# Patient Record
Sex: FEMALE | Race: WHITE | NOT HISPANIC OR LATINO | Employment: PART TIME | URBAN - NONMETROPOLITAN AREA
[De-identification: names, ages, dates, MRNs, and addresses within clinical notes are randomized per-mention and may not be internally consistent; named-entity substitution may affect disease eponyms.]

---

## 2020-10-22 ENCOUNTER — APPOINTMENT (OUTPATIENT)
Dept: LAB | Facility: HOSPITAL | Age: 65
End: 2020-10-22
Attending: INTERNAL MEDICINE
Payer: MEDICARE

## 2020-10-22 DIAGNOSIS — Z01.818 OTHER SPECIFIED PRE-OPERATIVE EXAMINATION: Primary | ICD-10-CM

## 2020-10-22 LAB — SARS-COV-2 RNA RESP QL NAA+PROBE: NOT DETECTED

## 2020-10-22 PROCEDURE — U0002 COVID-19 LAB TEST NON-CDC: HCPCS

## 2020-10-27 PROBLEM — I48.19 PERSISTENT ATRIAL FIBRILLATION: Status: ACTIVE | Noted: 2020-10-27

## 2020-10-27 PROBLEM — I48.91 ATRIAL FIBRILLATION: Status: ACTIVE | Noted: 2020-10-27

## 2022-01-24 ENCOUNTER — HOSPITAL ENCOUNTER (INPATIENT)
Facility: HOSPITAL | Age: 67
LOS: 4 days | Discharge: HOME | DRG: 378 | End: 2022-01-28
Attending: EMERGENCY MEDICINE | Admitting: INTERNAL MEDICINE
Payer: MEDICARE

## 2022-01-24 DIAGNOSIS — Z01.818 PREOP TESTING: ICD-10-CM

## 2022-01-24 DIAGNOSIS — I48.19 PERSISTENT ATRIAL FIBRILLATION: ICD-10-CM

## 2022-01-24 DIAGNOSIS — D50.0 IRON DEFICIENCY ANEMIA DUE TO CHRONIC BLOOD LOSS: ICD-10-CM

## 2022-01-24 DIAGNOSIS — I48.91 ATRIAL FIBRILLATION: ICD-10-CM

## 2022-01-24 DIAGNOSIS — R06.09 DOE (DYSPNEA ON EXERTION): ICD-10-CM

## 2022-01-24 DIAGNOSIS — K92.2 LOWER GI BLEED: ICD-10-CM

## 2022-01-24 DIAGNOSIS — D64.9 ANEMIA, UNSPECIFIED TYPE: ICD-10-CM

## 2022-01-24 DIAGNOSIS — K29.60 EROSIVE GASTRITIS: Primary | ICD-10-CM

## 2022-01-24 DIAGNOSIS — D64.9 SYMPTOMATIC ANEMIA: ICD-10-CM

## 2022-01-24 DIAGNOSIS — K92.2 GI BLEED: ICD-10-CM

## 2022-01-24 LAB
ABO + RH BLD: NORMAL
ALBUMIN SERPL BCP-MCNC: 2.8 G/DL (ref 3.5–5.2)
ALP SERPL-CCNC: 46 U/L (ref 55–135)
ALT SERPL W/O P-5'-P-CCNC: 15 U/L (ref 10–44)
ANION GAP SERPL CALC-SCNC: 6 MMOL/L (ref 8–16)
AST SERPL-CCNC: 15 U/L (ref 10–40)
BASOPHILS # BLD AUTO: 0.05 K/UL (ref 0–0.2)
BASOPHILS NFR BLD: 0.8 % (ref 0–1.9)
BILIRUB SERPL-MCNC: 0.6 MG/DL (ref 0.1–1)
BLD GP AB SCN CELLS X3 SERPL QL: NORMAL
BUN SERPL-MCNC: 19 MG/DL (ref 8–23)
CALCIUM SERPL-MCNC: 8.2 MG/DL (ref 8.7–10.5)
CHLORIDE SERPL-SCNC: 110 MMOL/L (ref 95–110)
CO2 SERPL-SCNC: 29 MMOL/L (ref 23–29)
CREAT SERPL-MCNC: 1.2 MG/DL (ref 0.5–1.4)
CTP QC/QA: YES
DIFFERENTIAL METHOD: ABNORMAL
EOSINOPHIL # BLD AUTO: 0.1 K/UL (ref 0–0.5)
EOSINOPHIL NFR BLD: 1.8 % (ref 0–8)
ERYTHROCYTE [DISTWIDTH] IN BLOOD BY AUTOMATED COUNT: 18.4 % (ref 11.5–14.5)
EST. GFR  (AFRICAN AMERICAN): 53.7 ML/MIN/1.73 M^2
EST. GFR  (NON AFRICAN AMERICAN): 46.5 ML/MIN/1.73 M^2
GLUCOSE SERPL-MCNC: 107 MG/DL (ref 70–110)
HCT VFR BLD AUTO: 20.1 % (ref 37–48.5)
HGB BLD-MCNC: 5.8 G/DL (ref 12–16)
IMM GRANULOCYTES # BLD AUTO: 0.01 K/UL (ref 0–0.04)
IMM GRANULOCYTES NFR BLD AUTO: 0.2 % (ref 0–0.5)
IRON SATN MFR SERPL: 6 % (ref 20–50)
IRON SERPL-MCNC: 22 UG/DL (ref 30–160)
LYMPHOCYTES # BLD AUTO: 1.9 K/UL (ref 1–4.8)
LYMPHOCYTES NFR BLD: 29.9 % (ref 18–48)
MCH RBC QN AUTO: 34.1 PG (ref 27–31)
MCHC RBC AUTO-ENTMCNC: 28.9 G/DL (ref 32–36)
MCV RBC AUTO: 118 FL (ref 82–98)
MONOCYTES # BLD AUTO: 0.6 K/UL (ref 0.3–1)
MONOCYTES NFR BLD: 9.7 % (ref 4–15)
NEUTROPHILS # BLD AUTO: 3.6 K/UL (ref 1.8–7.7)
NEUTROPHILS NFR BLD: 57.6 % (ref 38–73)
NRBC BLD-RTO: 2 /100 WBC
NT-PROBNP SERPL-MCNC: 2372 PG/ML (ref 5–900)
OB PNL STL: POSITIVE
PLATELET # BLD AUTO: 192 K/UL (ref 150–450)
PMV BLD AUTO: 11.2 FL (ref 9.2–12.9)
POTASSIUM SERPL-SCNC: 3.8 MMOL/L (ref 3.5–5.1)
PROT SERPL-MCNC: 5.9 G/DL (ref 6–8.4)
RBC # BLD AUTO: 1.7 M/UL (ref 4–5.4)
SARS-COV-2 RDRP RESP QL NAA+PROBE: NEGATIVE
SODIUM SERPL-SCNC: 145 MMOL/L (ref 136–145)
TOTAL IRON BINDING CAPACITY: 375 UG/DL (ref 250–450)
TROPONIN I SERPL DL<=0.01 NG/ML-MCNC: 15.9 PG/ML (ref 0–60)
WBC # BLD AUTO: 6.18 K/UL (ref 3.9–12.7)

## 2022-01-24 PROCEDURE — 36430 TRANSFUSION BLD/BLD COMPNT: CPT

## 2022-01-24 PROCEDURE — 27000221 HC OXYGEN, UP TO 24 HOURS

## 2022-01-24 PROCEDURE — 86920 COMPATIBILITY TEST SPIN: CPT | Performed by: EMERGENCY MEDICINE

## 2022-01-24 PROCEDURE — 84484 ASSAY OF TROPONIN QUANT: CPT | Performed by: EMERGENCY MEDICINE

## 2022-01-24 PROCEDURE — 99285 EMERGENCY DEPT VISIT HI MDM: CPT | Mod: 25

## 2022-01-24 PROCEDURE — 94761 N-INVAS EAR/PLS OXIMETRY MLT: CPT

## 2022-01-24 PROCEDURE — C9113 INJ PANTOPRAZOLE SODIUM, VIA: HCPCS | Performed by: SURGERY

## 2022-01-24 PROCEDURE — 83540 ASSAY OF IRON: CPT | Performed by: EMERGENCY MEDICINE

## 2022-01-24 PROCEDURE — 85025 COMPLETE CBC W/AUTO DIFF WBC: CPT | Performed by: EMERGENCY MEDICINE

## 2022-01-24 PROCEDURE — 99900031 HC PATIENT EDUCATION (STAT)

## 2022-01-24 PROCEDURE — 93010 ELECTROCARDIOGRAM REPORT: CPT | Mod: ,,, | Performed by: INTERNAL MEDICINE

## 2022-01-24 PROCEDURE — P9016 RBC LEUKOCYTES REDUCED: HCPCS | Performed by: EMERGENCY MEDICINE

## 2022-01-24 PROCEDURE — 80053 COMPREHEN METABOLIC PANEL: CPT | Performed by: EMERGENCY MEDICINE

## 2022-01-24 PROCEDURE — 25000003 PHARM REV CODE 250: Performed by: SURGERY

## 2022-01-24 PROCEDURE — 25000003 PHARM REV CODE 250: Performed by: EMERGENCY MEDICINE

## 2022-01-24 PROCEDURE — 11000001 HC ACUTE MED/SURG PRIVATE ROOM

## 2022-01-24 PROCEDURE — 93010 EKG 12-LEAD: ICD-10-PCS | Mod: ,,, | Performed by: INTERNAL MEDICINE

## 2022-01-24 PROCEDURE — 83880 ASSAY OF NATRIURETIC PEPTIDE: CPT | Performed by: EMERGENCY MEDICINE

## 2022-01-24 PROCEDURE — 63600175 PHARM REV CODE 636 W HCPCS: Performed by: SURGERY

## 2022-01-24 PROCEDURE — 82272 OCCULT BLD FECES 1-3 TESTS: CPT | Performed by: EMERGENCY MEDICINE

## 2022-01-24 PROCEDURE — 86850 RBC ANTIBODY SCREEN: CPT | Performed by: EMERGENCY MEDICINE

## 2022-01-24 PROCEDURE — 36415 COLL VENOUS BLD VENIPUNCTURE: CPT | Performed by: EMERGENCY MEDICINE

## 2022-01-24 PROCEDURE — U0002 COVID-19 LAB TEST NON-CDC: HCPCS | Performed by: EMERGENCY MEDICINE

## 2022-01-24 PROCEDURE — 93005 ELECTROCARDIOGRAM TRACING: CPT

## 2022-01-24 PROCEDURE — 99900035 HC TECH TIME PER 15 MIN (STAT)

## 2022-01-24 RX ORDER — SODIUM CHLORIDE 0.9 % (FLUSH) 0.9 %
10 SYRINGE (ML) INJECTION
Status: DISCONTINUED | OUTPATIENT
Start: 2022-01-24 | End: 2022-01-28 | Stop reason: HOSPADM

## 2022-01-24 RX ORDER — ONDANSETRON 2 MG/ML
4 INJECTION INTRAMUSCULAR; INTRAVENOUS EVERY 8 HOURS PRN
Status: DISCONTINUED | OUTPATIENT
Start: 2022-01-24 | End: 2022-01-28 | Stop reason: HOSPADM

## 2022-01-24 RX ORDER — TALC
6 POWDER (GRAM) TOPICAL NIGHTLY PRN
Status: DISCONTINUED | OUTPATIENT
Start: 2022-01-24 | End: 2022-01-28 | Stop reason: HOSPADM

## 2022-01-24 RX ORDER — HYDROCODONE BITARTRATE AND ACETAMINOPHEN 5; 325 MG/1; MG/1
1 TABLET ORAL EVERY 6 HOURS PRN
Status: DISCONTINUED | OUTPATIENT
Start: 2022-01-24 | End: 2022-01-25

## 2022-01-24 RX ORDER — HYDROCODONE BITARTRATE AND ACETAMINOPHEN 500; 5 MG/1; MG/1
TABLET ORAL
Status: DISCONTINUED | OUTPATIENT
Start: 2022-01-24 | End: 2022-01-28 | Stop reason: HOSPADM

## 2022-01-24 RX ORDER — GABAPENTIN 300 MG/1
300 CAPSULE ORAL NIGHTLY
Status: DISCONTINUED | OUTPATIENT
Start: 2022-01-24 | End: 2022-01-28 | Stop reason: HOSPADM

## 2022-01-24 RX ORDER — METOPROLOL TARTRATE 50 MG/1
50 TABLET ORAL 2 TIMES DAILY
Status: DISCONTINUED | OUTPATIENT
Start: 2022-01-24 | End: 2022-01-28 | Stop reason: HOSPADM

## 2022-01-24 RX ORDER — ALPRAZOLAM 0.5 MG/1
0.5 TABLET ORAL 2 TIMES DAILY PRN
Status: DISCONTINUED | OUTPATIENT
Start: 2022-01-24 | End: 2022-01-28 | Stop reason: HOSPADM

## 2022-01-24 RX ORDER — FUROSEMIDE 20 MG/1
20 TABLET ORAL DAILY
Status: DISCONTINUED | OUTPATIENT
Start: 2022-01-25 | End: 2022-01-28 | Stop reason: HOSPADM

## 2022-01-24 RX ORDER — PANTOPRAZOLE SODIUM 40 MG/10ML
40 INJECTION, POWDER, LYOPHILIZED, FOR SOLUTION INTRAVENOUS 2 TIMES DAILY
Status: DISCONTINUED | OUTPATIENT
Start: 2022-01-24 | End: 2022-01-28 | Stop reason: HOSPADM

## 2022-01-24 RX ORDER — FUROSEMIDE 10 MG/ML
20 INJECTION INTRAMUSCULAR; INTRAVENOUS ONCE AS NEEDED
Status: COMPLETED | OUTPATIENT
Start: 2022-01-24 | End: 2022-01-25

## 2022-01-24 RX ADMIN — HYDROCODONE BITARTRATE AND ACETAMINOPHEN 1 TABLET: 5; 325 TABLET ORAL at 08:01

## 2022-01-24 RX ADMIN — GABAPENTIN 300 MG: 300 CAPSULE ORAL at 08:01

## 2022-01-24 RX ADMIN — MELATONIN TAB 3 MG 6 MG: 3 TAB at 08:01

## 2022-01-24 RX ADMIN — ALPRAZOLAM 0.5 MG: 0.5 TABLET ORAL at 08:01

## 2022-01-24 RX ADMIN — SODIUM CHLORIDE: 0.9 INJECTION, SOLUTION INTRAVENOUS at 08:01

## 2022-01-24 RX ADMIN — PANTOPRAZOLE SODIUM 40 MG: 40 INJECTION, POWDER, FOR SOLUTION INTRAVENOUS at 10:01

## 2022-01-24 NOTE — ED PROVIDER NOTES
Encounter Date: 1/24/2022       History     Chief Complaint   Patient presents with    Shortness of Breath     EMS reporting that pt has dyspnea upon exertion since Tueday, (spo2 drops into upper 80s). Spo2 99% at rest.      67 yo female with history of atrial fibrillation on eliquis here via EMS with BRIGGS. Gradual onset x 1 week. No CP. Symptoms resolve at rest. No fever. Denies any hemorrhage. No prior similar.         Review of patient's allergies indicates:   Allergen Reactions    Iodine and iodide containing products Edema    Latex, natural rubber Hives     Past Medical History:   Diagnosis Date    Adult hypothyroidism     Anxiety     Atherosclerosis of both carotid arteries     Atrial fibrillation     Atrial fibrillation     CHF (congestive heart failure)     Moderate aortic valve stenosis      Past Surgical History:   Procedure Laterality Date    APPENDECTOMY      KNEE SURGERY      TONSILLECTOMY      TREATMENT OF CARDIAC ARRHYTHMIA N/A 10/27/2020    Procedure: Cardioversion or Defibrillation;  Surgeon: Gavin Duran MD;  Location: [facility];  Service: Cardiovascular;  Laterality: N/A;  APPROVED PER POORNIMA 10/6     Family History   Problem Relation Age of Onset    Heart failure Father     Hypertension Sister     Atrial fibrillation Sister     Cancer Brother     Hypertension Brother      Social History     Tobacco Use    Smoking status: Never Smoker     Review of Systems   Constitutional: Positive for fatigue. Negative for fever.   Respiratory: Positive for shortness of breath.    Cardiovascular: Negative.    Gastrointestinal: Negative.    All other systems reviewed and are negative.      Physical Exam     Initial Vitals   BP Pulse Resp Temp SpO2   01/24/22 1415 01/24/22 1415 01/24/22 1415 01/24/22 1418 01/24/22 1415   (!) 119/42 (!) 56 20 98 °F (36.7 °C) 99 %      MAP       --                Physical Exam    Nursing note and vitals reviewed.  Constitutional: She appears well-developed and  well-nourished. She is not diaphoretic. No distress.   HENT:   Head: Normocephalic and atraumatic.   Eyes: EOM are normal. Pupils are equal, round, and reactive to light. No scleral icterus.   Pale conjunctiva   Neck: Neck supple.   Normal range of motion.  Cardiovascular: Normal rate, regular rhythm and intact distal pulses.   Murmur heard.  Pulmonary/Chest: Breath sounds normal. No respiratory distress. She has no wheezes. She has no rales.   Abdominal: Abdomen is soft. Bowel sounds are normal. She exhibits no distension. There is no abdominal tenderness. There is no rebound.   Musculoskeletal:         General: No tenderness or edema. Normal range of motion.      Cervical back: Normal range of motion and neck supple.     Neurological: She is alert. She has normal strength and normal reflexes.   Skin: Skin is warm and dry. Capillary refill takes less than 2 seconds. No rash noted.         ED Course   Procedures  Labs Reviewed   CBC W/ AUTO DIFFERENTIAL - Abnormal; Notable for the following components:       Result Value    RBC 1.70 (*)     Hemoglobin 5.8 (*)     Hematocrit 20.1 (*)      (*)     MCH 34.1 (*)     MCHC 28.9 (*)     RDW 18.4 (*)     nRBC 2 (*)     All other components within normal limits    Narrative:     Called to and read back by Yola TOMLINSON by PRESLEY 01/24/2022 15:01   COMPREHENSIVE METABOLIC PANEL - Abnormal; Notable for the following components:    Calcium 8.2 (*)     Total Protein 5.9 (*)     Albumin 2.8 (*)     Alkaline Phosphatase 46 (*)     Anion Gap 6 (*)     eGFR if  53.7 (*)     eGFR if non  46.5 (*)     All other components within normal limits   NT-PRO NATRIURETIC PEPTIDE - Abnormal; Notable for the following components:    NT-proBNP 2372 (*)     All other components within normal limits   OCCULT BLOOD X 1, STOOL - Abnormal; Notable for the following components:    Occult Blood Positive (*)     All other components within normal limits   TROPONIN I HIGH  SENSITIVITY   IRON AND TIBC   IRON AND TIBC   SARS-COV-2 RDRP GENE    Narrative:     ..This test utilizes isothermal nucleic acid amplification   technology to detect the SARS-CoV-2 RdRp nucleic acid segment.   The analytical sensitivity (limit of detection) is 125 genome   equivalents/mL.   A POSITIVE result implies infection with the SARS-CoV-2 virus;   the patient is presumed to be contagious.     A NEGATIVE result means that SARS-CoV-2 nucleic acids are not   present above the limit of detection. A NEGATIVE result should be   treated as presumptive. It does not rule out the possibility of   COVID-19 and should not be the sole basis for treatment decisions.   If COVID-19 is strongly suspected based on clinical and exposure   history, re-testing using an alternate molecular assay should be   considered.   This test is only for use under the Food and Drug   Administration s Emergency Use Authorization (EUA).   Commercial kits are provided by [employer].   Performance characteristics of the EUA have been independently   verified by [facility] Department of   Pathology and Laboratory Medicine.   _________________________________________________________________   The authorized Fact Sheet for Healthcare Providers and the authorized Fact   Sheet for Patients of the ID NOW COVID-19 are available on the FDA   website:     https://www.fda.gov/media/025608/download  https://www.fda.gov/media/071461/download           TYPE & SCREEN     EKG Readings: (Independently Interpreted)   Initial Reading: No STEMI. Rhythm: Sinus Bradycardia. Heart Rate: 54. Ectopy: No Ectopy. Clinical Impression: Sinus Bradycardia     ECG Results          EKG 12-lead (Final result)  Result time 01/24/22 16:06:26    Final result by Interface, Lab In [facility] (01/24/22 16:06:26)                 Narrative:    Test Reason : R06.00,    Vent. Rate : 054 BPM     Atrial Rate : 054 BPM     P-R Int : 188 ms          QRS Dur : 086 ms      QT  Int : 458 ms       P-R-T Axes : 083 -07 035 degrees     QTc Int : 434 ms    Sinus bradycardia  Otherwise normal ECG  When compared with ECG of 27-OCT-2020 07:44,  Criteria for Inferior infarct are no longer Present  ST no longer depressed in Inferior leads  Confirmed by DARLYN CHAMBERS MD ([id number]) on 1/24/2022 4:06:18 PM    Referred By: System System           Confirmed By:DARLYN CHAMBERS MD                            Imaging Results          X-Ray Chest AP Portable (Final result)  Result time 01/24/22 14:40:00    Final result by Natahnael Hill MD (01/24/22 14:40:00)                 Impression:      Chronic elevation right hemidiaphragm.  No acute findings.      Electronically signed by: Nathanael Hill MD  Date:    01/24/2022  Time:    14:40             Narrative:    EXAMINATION:  XR CHEST AP PORTABLE    CLINICAL HISTORY:  Dyspnea, unspecified    COMPARISON:  Chest x-ray 02/13/2013.    FINDINGS:  Unremarkable AP cardiac silhouette.  Chronic elevation right hemidiaphragm with adjacent atelectasis.  No acute consolidation or pleural fluid.                                 Medications - No data to display  Medical Decision Making:   Clinical Tests:   Lab Tests: Ordered and Reviewed  Radiological Study: Reviewed and Ordered  Medical Tests: Ordered and Reviewed  ED Management:  Discussed with Dr Kellogg, agrees to admit patient.                       Clinical Impression:   Final diagnoses:  [R06.00] BRIGGS (dyspnea on exertion)  [D64.9] Anemia, unspecified type (Primary)  [D64.9] Symptomatic anemia  [K92.2] Lower GI bleed          ED Disposition Condition    Observation               Beni Solis MD  01/24/22 3004

## 2022-01-25 PROBLEM — K92.2 GI BLEED: Status: ACTIVE | Noted: 2022-01-25

## 2022-01-25 PROBLEM — D64.9 ANEMIA: Status: ACTIVE | Noted: 2022-01-25

## 2022-01-25 PROBLEM — R06.02 SOB (SHORTNESS OF BREATH): Status: ACTIVE | Noted: 2022-01-25

## 2022-01-25 LAB
ALBUMIN SERPL BCP-MCNC: 2.7 G/DL (ref 3.5–5.2)
ALP SERPL-CCNC: 47 U/L (ref 55–135)
ALT SERPL W/O P-5'-P-CCNC: 16 U/L (ref 10–44)
ANION GAP SERPL CALC-SCNC: 4 MMOL/L (ref 8–16)
AST SERPL-CCNC: 19 U/L (ref 10–40)
BASOPHILS # BLD AUTO: 0.07 K/UL (ref 0–0.2)
BASOPHILS NFR BLD: 1.1 % (ref 0–1.9)
BILIRUB SERPL-MCNC: 0.9 MG/DL (ref 0.1–1)
BLD PROD TYP BPU: NORMAL
BLOOD UNIT EXPIRATION DATE: NORMAL
BLOOD UNIT TYPE CODE: 5100
BLOOD UNIT TYPE: NORMAL
BUN SERPL-MCNC: 17 MG/DL (ref 8–23)
CALCIUM SERPL-MCNC: 8 MG/DL (ref 8.7–10.5)
CHLORIDE SERPL-SCNC: 112 MMOL/L (ref 95–110)
CO2 SERPL-SCNC: 31 MMOL/L (ref 23–29)
CODING SYSTEM: NORMAL
CREAT SERPL-MCNC: 1.2 MG/DL (ref 0.5–1.4)
DIFFERENTIAL METHOD: ABNORMAL
DISPENSE STATUS: NORMAL
EOSINOPHIL # BLD AUTO: 0.2 K/UL (ref 0–0.5)
EOSINOPHIL NFR BLD: 2.6 % (ref 0–8)
ERYTHROCYTE [DISTWIDTH] IN BLOOD BY AUTOMATED COUNT: 23 % (ref 11.5–14.5)
EST. GFR  (AFRICAN AMERICAN): 53.7 ML/MIN/1.73 M^2
EST. GFR  (NON AFRICAN AMERICAN): 46.5 ML/MIN/1.73 M^2
GLUCOSE SERPL-MCNC: 85 MG/DL (ref 70–110)
HCT VFR BLD AUTO: 29.7 % (ref 37–48.5)
HGB BLD-MCNC: 9.2 G/DL (ref 12–16)
IMM GRANULOCYTES # BLD AUTO: 0.02 K/UL (ref 0–0.04)
IMM GRANULOCYTES NFR BLD AUTO: 0.3 % (ref 0–0.5)
LYMPHOCYTES # BLD AUTO: 2 K/UL (ref 1–4.8)
LYMPHOCYTES NFR BLD: 31.3 % (ref 18–48)
MCH RBC QN AUTO: 32.7 PG (ref 27–31)
MCHC RBC AUTO-ENTMCNC: 31 G/DL (ref 32–36)
MCV RBC AUTO: 106 FL (ref 82–98)
MONOCYTES # BLD AUTO: 0.6 K/UL (ref 0.3–1)
MONOCYTES NFR BLD: 10.3 % (ref 4–15)
NEUTROPHILS # BLD AUTO: 3.4 K/UL (ref 1.8–7.7)
NEUTROPHILS NFR BLD: 54.4 % (ref 38–73)
NRBC BLD-RTO: 1 /100 WBC
NUM UNITS TRANS PACKED RBC: NORMAL
PLATELET # BLD AUTO: 197 K/UL (ref 150–450)
PMV BLD AUTO: 11.2 FL (ref 9.2–12.9)
POTASSIUM SERPL-SCNC: 3.8 MMOL/L (ref 3.5–5.1)
PROT SERPL-MCNC: 5.9 G/DL (ref 6–8.4)
RBC # BLD AUTO: 2.81 M/UL (ref 4–5.4)
SODIUM SERPL-SCNC: 147 MMOL/L (ref 136–145)
WBC # BLD AUTO: 6.24 K/UL (ref 3.9–12.7)

## 2022-01-25 PROCEDURE — 25000003 PHARM REV CODE 250: Performed by: INTERNAL MEDICINE

## 2022-01-25 PROCEDURE — C9113 INJ PANTOPRAZOLE SODIUM, VIA: HCPCS | Performed by: SURGERY

## 2022-01-25 PROCEDURE — 99900031 HC PATIENT EDUCATION (STAT)

## 2022-01-25 PROCEDURE — 93010 EKG 12-LEAD: ICD-10-PCS | Mod: ,,, | Performed by: INTERNAL MEDICINE

## 2022-01-25 PROCEDURE — 36430 TRANSFUSION BLD/BLD COMPNT: CPT

## 2022-01-25 PROCEDURE — 93010 ELECTROCARDIOGRAM REPORT: CPT | Mod: ,,, | Performed by: INTERNAL MEDICINE

## 2022-01-25 PROCEDURE — 99900035 HC TECH TIME PER 15 MIN (STAT)

## 2022-01-25 PROCEDURE — 63600175 PHARM REV CODE 636 W HCPCS: Performed by: SURGERY

## 2022-01-25 PROCEDURE — 94761 N-INVAS EAR/PLS OXIMETRY MLT: CPT

## 2022-01-25 PROCEDURE — 25000003 PHARM REV CODE 250: Performed by: EMERGENCY MEDICINE

## 2022-01-25 PROCEDURE — 93005 ELECTROCARDIOGRAM TRACING: CPT

## 2022-01-25 PROCEDURE — 80053 COMPREHEN METABOLIC PANEL: CPT | Performed by: EMERGENCY MEDICINE

## 2022-01-25 PROCEDURE — 27000221 HC OXYGEN, UP TO 24 HOURS

## 2022-01-25 PROCEDURE — 63600175 PHARM REV CODE 636 W HCPCS: Performed by: EMERGENCY MEDICINE

## 2022-01-25 PROCEDURE — 36415 COLL VENOUS BLD VENIPUNCTURE: CPT | Performed by: EMERGENCY MEDICINE

## 2022-01-25 PROCEDURE — 25000003 PHARM REV CODE 250: Performed by: NURSE PRACTITIONER

## 2022-01-25 PROCEDURE — 11000001 HC ACUTE MED/SURG PRIVATE ROOM

## 2022-01-25 PROCEDURE — C9113 INJ PANTOPRAZOLE SODIUM, VIA: HCPCS | Performed by: INTERNAL MEDICINE

## 2022-01-25 PROCEDURE — 63600175 PHARM REV CODE 636 W HCPCS: Performed by: INTERNAL MEDICINE

## 2022-01-25 PROCEDURE — 85025 COMPLETE CBC W/AUTO DIFF WBC: CPT | Performed by: EMERGENCY MEDICINE

## 2022-01-25 PROCEDURE — P9016 RBC LEUKOCYTES REDUCED: HCPCS | Performed by: EMERGENCY MEDICINE

## 2022-01-25 RX ORDER — LEVOTHYROXINE SODIUM 150 UG/1
150 TABLET ORAL
Status: DISCONTINUED | OUTPATIENT
Start: 2022-01-25 | End: 2022-01-28 | Stop reason: HOSPADM

## 2022-01-25 RX ORDER — HYDROCODONE BITARTRATE AND ACETAMINOPHEN 7.5; 325 MG/1; MG/1
1 TABLET ORAL EVERY 6 HOURS PRN
Status: DISCONTINUED | OUTPATIENT
Start: 2022-01-25 | End: 2022-01-28 | Stop reason: HOSPADM

## 2022-01-25 RX ORDER — AMIODARONE HYDROCHLORIDE 100 MG/1
100 TABLET ORAL DAILY
Status: DISCONTINUED | OUTPATIENT
Start: 2022-01-25 | End: 2022-01-28 | Stop reason: HOSPADM

## 2022-01-25 RX ORDER — ASCORBIC ACID 500 MG
1000 TABLET ORAL DAILY
Status: DISCONTINUED | OUTPATIENT
Start: 2022-01-25 | End: 2022-01-28 | Stop reason: HOSPADM

## 2022-01-25 RX ADMIN — OXYCODONE HYDROCHLORIDE AND ACETAMINOPHEN 1000 MG: 500 TABLET ORAL at 10:01

## 2022-01-25 RX ADMIN — FUROSEMIDE 20 MG: 10 INJECTION, SOLUTION INTRAMUSCULAR; INTRAVENOUS at 02:01

## 2022-01-25 RX ADMIN — METOPROLOL TARTRATE 50 MG: 50 TABLET, FILM COATED ORAL at 07:01

## 2022-01-25 RX ADMIN — FUROSEMIDE 20 MG: 20 TABLET ORAL at 07:01

## 2022-01-25 RX ADMIN — AMIODARONE HYDROCHLORIDE 100 MG: 100 TABLET ORAL at 04:01

## 2022-01-25 RX ADMIN — METOPROLOL TARTRATE 50 MG: 50 TABLET, FILM COATED ORAL at 02:01

## 2022-01-25 RX ADMIN — IRON SUCROSE 200 MG: 20 INJECTION, SOLUTION INTRAVENOUS at 08:01

## 2022-01-25 RX ADMIN — METOPROLOL TARTRATE 50 MG: 50 TABLET, FILM COATED ORAL at 08:01

## 2022-01-25 RX ADMIN — PANTOPRAZOLE SODIUM 40 MG: 40 INJECTION, POWDER, FOR SOLUTION INTRAVENOUS at 07:01

## 2022-01-25 RX ADMIN — HYDROCODONE BITARTRATE AND ACETAMINOPHEN 1 TABLET: 7.5; 325 TABLET ORAL at 08:01

## 2022-01-25 RX ADMIN — MELATONIN TAB 3 MG 6 MG: 3 TAB at 08:01

## 2022-01-25 RX ADMIN — GABAPENTIN 300 MG: 300 CAPSULE ORAL at 08:01

## 2022-01-25 RX ADMIN — LEVOTHYROXINE SODIUM 150 MCG: 0.15 TABLET ORAL at 08:01

## 2022-01-25 RX ADMIN — PANTOPRAZOLE SODIUM 40 MG: 40 INJECTION, POWDER, FOR SOLUTION INTRAVENOUS at 08:01

## 2022-01-25 RX ADMIN — ALPRAZOLAM 0.5 MG: 0.5 TABLET ORAL at 08:01

## 2022-01-25 NOTE — HPI
67 yo female with history of atrial fibrillation on eliquis here via EMS with BRIGGS. Gradual onset x 1 week. No CP. Symptoms resolve at rest. No fever. Denies any hemorrhage. No prior similar.  Pt denies any brbpr or melena, or heartburn.

## 2022-01-25 NOTE — ASSESSMENT & PLAN NOTE
Pt with significant iron def anemia - sp 3 u prbcs - repeat h/h improved - gilberto singh dose of venofer - pt with positive fobt

## 2022-01-25 NOTE — PLAN OF CARE
01/25/22 0808   Medicare Message   Important Message from Medicare regarding Discharge Appeal Rights Other (comments)  (Obtained by registration staff.)   Date IMM was signed 01/24/22   Time IMM was signed [id number]

## 2022-01-25 NOTE — H&P
[facility] Emergency Department  [facility] Medicine  History & Physical    Patient Name: Aurelia Ruggiero  MRN: [id number]  Patient Class: IP- Inpatient  Admission Date: 1/24/2022  Attending Physician: No att. providers found   Primary Care Provider: Marimar Kellogg MD         Patient information was obtained from patient and ER records.     Subjective:     Principal Problem:<principal problem not specified>    Chief Complaint:   Chief Complaint   Patient presents with    Shortness of Breath     EMS reporting that pt has dyspnea upon exertion since Tueday, (spo2 drops into upper 80s). Spo2 99% at rest.         HPI:   65 yo female with history of atrial fibrillation on eliquis here via EMS with BRIGGS. Gradual onset x 1 week. No CP. Symptoms resolve at rest. No fever. Denies any hemorrhage. No prior similar.  Pt denies any brbpr or melena, or heartburn.       Past Medical History:   Diagnosis Date    Adult hypothyroidism     Anxiety     Atherosclerosis of both carotid arteries     Atrial fibrillation     Atrial fibrillation     CHF (congestive heart failure)     Moderate aortic valve stenosis        Past Surgical History:   Procedure Laterality Date    APPENDECTOMY      KNEE SURGERY      TONSILLECTOMY      TREATMENT OF CARDIAC ARRHYTHMIA N/A 10/27/2020    Procedure: Cardioversion or Defibrillation;  Surgeon: Gavin Duran MD;  Location: [facility];  Service: Cardiovascular;  Laterality: N/A;  APPROVED PER POORNIMA 10/6       Review of patient's allergies indicates:   Allergen Reactions    Iodine and iodide containing products Edema    Latex, natural rubber Hives       No current facility-administered medications on file prior to encounter.     Current Outpatient Medications on File Prior to Encounter   Medication Sig    ALPRAZolam (XANAX) 0.5 MG tablet Take 0.5 mg by mouth 2 (two) times daily as needed.    apixaban (ELIQUIS) 5 mg Tab Take 5 mg by mouth 2 (two) times daily.    ascorbic acid, vitamin C,  (VITAMIN C) 1000 MG tablet Take 1,000 mg by mouth once daily.    aspirin (ECOTRIN) 81 MG EC tablet Take 81 mg by mouth once daily.    b complex vitamins tablet Take 1 tablet by mouth once daily.    celecoxib (CELEBREX) 200 MG capsule Take 200 mg by mouth once daily.    ferrous sulfate (FEOSOL) 325 mg (65 mg iron) Tab tablet Take 325 mg by mouth daily with breakfast.    furosemide (LASIX) 20 MG tablet Take 20 mg by mouth once daily.    gabapentin (NEURONTIN) 300 MG capsule Take 300 mg by mouth every evening.    HYDROcodone-acetaminophen (NORCO) 7.5-325 mg per tablet Take 1 tablet by mouth daily as needed.    levothyroxine (SYNTHROID) 150 MCG tablet Take 150 mcg by mouth before breakfast.    metoprolol tartrate (LOPRESSOR) 50 MG tablet Take 50 mg by mouth 2 (two) times daily.    multivitamin (THERAGRAN) per tablet Take 1 tablet by mouth once daily.    vitamin D (VITAMIN D3) 1000 units Tab Take 1,000 Units by mouth 2 (two) times a day.     Family History     Problem Relation (Age of Onset)    Atrial fibrillation Sister    Cancer Brother    Heart failure Father    Hypertension Sister, Brother        Tobacco Use    Smoking status: Never Smoker    Smokeless tobacco: Never Used   Substance and Sexual Activity    Alcohol use: Never    Drug use: Never    Sexual activity: Not on file     Review of Systems   Constitutional: Positive for activity change. Negative for chills, fatigue and fever.   HENT: Negative for ear pain, postnasal drip and sore throat.    Respiratory: Negative for cough, choking, shortness of breath and wheezing.    Cardiovascular: Negative for chest pain, palpitations and leg swelling.   Gastrointestinal: Negative for abdominal pain, blood in stool, nausea and vomiting.   Genitourinary: Negative for difficulty urinating.   Musculoskeletal: Positive for arthralgias and back pain.   Skin: Negative for rash and wound.   Neurological: Positive for weakness. Negative for syncope.     Objective:      Vital Signs (Most Recent):  Temp: 98.9 °F (37.2 °C) (01/25/22 0705)  Pulse: 62 (01/25/22 0705)  Resp: 20 (01/25/22 0705)  BP: 123/68 (01/25/22 0705)  SpO2: 99 % (01/25/22 0705) Vital Signs (24h Range):  Temp:  [97.7 °F (36.5 °C)-98.9 °F (37.2 °C)] 98.9 °F (37.2 °C)  Pulse:  [56-83] 62  Resp:  [16-48] 20  SpO2:  [72 %-100 %] 99 %  BP: ()/(42-71) 123/68     Weight: (!) 142.9 kg (315 lb)  Body mass index is 54.07 kg/m².    Physical Exam  Constitutional:       Appearance: Normal appearance.      Comments: Obese female   HENT:      Nose: Nose normal.      Mouth/Throat:      Mouth: Mucous membranes are moist.   Eyes:      Extraocular Movements: Extraocular movements intact.      Pupils: Pupils are equal, round, and reactive to light.   Cardiovascular:      Rate and Rhythm: Normal rate and regular rhythm.   Pulmonary:      Effort: Pulmonary effort is normal. No respiratory distress.      Breath sounds: Normal breath sounds. No wheezing.   Abdominal:      General: There is no distension.      Palpations: Abdomen is soft.   Musculoskeletal:         General: Normal range of motion.   Skin:     General: Skin is warm.   Neurological:      Mental Status: She is alert.           CRANIAL NERVES     CN III, IV, VI   Pupils are equal, round, and reactive to light.       Significant Labs:   All pertinent labs within the past 24 hours have been reviewed.  CBC:   Recent Labs   Lab 01/24/22  1440 01/25/22  0653   WBC 6.18 6.24   HGB 5.8* 9.2*   HCT 20.1* 29.7*    197     CMP:   Recent Labs   Lab 01/24/22  1440 01/25/22  0653    147*   K 3.8 3.8    112*   CO2 29 31*    85   BUN 19 17   CREATININE 1.2 1.2   CALCIUM 8.2* 8.0*   PROT 5.9* 5.9*   ALBUMIN 2.8* 2.7*   BILITOT 0.6 0.9   ALKPHOS 46* 47*   AST 15 19   ALT 15 16   ANIONGAP 6* 4*   EGFRNONAA 46.5* 46.5*       Significant Imaging: I have reviewed all pertinent imaging results/findings within the past 24 hours.    Assessment/Plan:     SOB  (shortness of breath)  Sec to significant anemia - improved after blood transfusion      GI bleed  Positive fobt - surgery consult for poss egd/colon - protonix      Anemia  Pt with significant iron def anemia - sp 3 u prbcs - repeat h/h improved - gilberto hortensiaove dose of venofer - pt with positive fobt       Persistent atrial fibrillation  Patient with Persistent (7 days or more) atrial fibrillation which is controlled currently with Beta Blocker. Patient is currently in atrial fibrillation.[id number] Score: 2. Anticoagulation indicated. Anticoagulation done with eliquis - however curretnly on hold due to gi bleed and significant anemia.          VTE Risk Mitigation (From admission, onward)         Ordered     IP VTE HIGH RISK PATIENT  Once         01/24/22 1744     Place sequential compression device  Until discontinued         01/24/22 1744                   Marimar Kellogg MD  Department of Hospital Medicine   [place] - Emergency Department

## 2022-01-25 NOTE — SUBJECTIVE & OBJECTIVE
Past Medical History:   Diagnosis Date    Adult hypothyroidism     Anxiety     Atherosclerosis of both carotid arteries     Atrial fibrillation     Atrial fibrillation     CHF (congestive heart failure)     Moderate aortic valve stenosis        Past Surgical History:   Procedure Laterality Date    APPENDECTOMY      KNEE SURGERY      TONSILLECTOMY      TREATMENT OF CARDIAC ARRHYTHMIA N/A 10/27/2020    Procedure: Cardioversion or Defibrillation;  Surgeon: Gavin Duran MD;  Location: [facility];  Service: Cardiovascular;  Laterality: N/A;  APPROVED PER POORNIMA 10/6       Review of patient's allergies indicates:   Allergen Reactions    Iodine and iodide containing products Edema    Latex, natural rubber Hives       No current facility-administered medications on file prior to encounter.     Current Outpatient Medications on File Prior to Encounter   Medication Sig    ALPRAZolam (XANAX) 0.5 MG tablet Take 0.5 mg by mouth 2 (two) times daily as needed.    apixaban (ELIQUIS) 5 mg Tab Take 5 mg by mouth 2 (two) times daily.    ascorbic acid, vitamin C, (VITAMIN C) 1000 MG tablet Take 1,000 mg by mouth once daily.    aspirin (ECOTRIN) 81 MG EC tablet Take 81 mg by mouth once daily.    b complex vitamins tablet Take 1 tablet by mouth once daily.    celecoxib (CELEBREX) 200 MG capsule Take 200 mg by mouth once daily.    ferrous sulfate (FEOSOL) 325 mg (65 mg iron) Tab tablet Take 325 mg by mouth daily with breakfast.    furosemide (LASIX) 20 MG tablet Take 20 mg by mouth once daily.    gabapentin (NEURONTIN) 300 MG capsule Take 300 mg by mouth every evening.    HYDROcodone-acetaminophen (NORCO) 7.5-325 mg per tablet Take 1 tablet by mouth daily as needed.    levothyroxine (SYNTHROID) 150 MCG tablet Take 150 mcg by mouth before breakfast.    metoprolol tartrate (LOPRESSOR) 50 MG tablet Take 50 mg by mouth 2 (two) times daily.    multivitamin (THERAGRAN) per tablet Take 1 tablet by mouth once daily.     vitamin D (VITAMIN D3) 1000 units Tab Take 1,000 Units by mouth 2 (two) times a day.     Family History     Problem Relation (Age of Onset)    Atrial fibrillation Sister    Cancer Brother    Heart failure Father    Hypertension Sister, Brother        Tobacco Use    Smoking status: Never Smoker    Smokeless tobacco: Never Used   Substance and Sexual Activity    Alcohol use: Never    Drug use: Never    Sexual activity: Not on file     Review of Systems   Constitutional: Positive for activity change. Negative for chills, fatigue and fever.   HENT: Negative for ear pain, postnasal drip and sore throat.    Respiratory: Negative for cough, choking, shortness of breath and wheezing.    Cardiovascular: Negative for chest pain, palpitations and leg swelling.   Gastrointestinal: Negative for abdominal pain, blood in stool, nausea and vomiting.   Genitourinary: Negative for difficulty urinating.   Musculoskeletal: Positive for arthralgias and back pain.   Skin: Negative for rash and wound.   Neurological: Positive for weakness. Negative for syncope.     Objective:     Vital Signs (Most Recent):  Temp: 98.9 °F (37.2 °C) (01/25/22 0705)  Pulse: 62 (01/25/22 0705)  Resp: 20 (01/25/22 0705)  BP: 123/68 (01/25/22 0705)  SpO2: 99 % (01/25/22 0705) Vital Signs (24h Range):  Temp:  [97.7 °F (36.5 °C)-98.9 °F (37.2 °C)] 98.9 °F (37.2 °C)  Pulse:  [56-83] 62  Resp:  [16-48] 20  SpO2:  [72 %-100 %] 99 %  BP: ()/(42-71) 123/68     Weight: (!) 142.9 kg (315 lb)  Body mass index is 54.07 kg/m².    Physical Exam  Constitutional:       Appearance: Normal appearance.      Comments: Obese female   HENT:      Nose: Nose normal.      Mouth/Throat:      Mouth: Mucous membranes are moist.   Eyes:      Extraocular Movements: Extraocular movements intact.      Pupils: Pupils are equal, round, and reactive to light.   Cardiovascular:      Rate and Rhythm: Normal rate and regular rhythm.   Pulmonary:      Effort: Pulmonary effort is  normal. No respiratory distress.      Breath sounds: Normal breath sounds. No wheezing.   Abdominal:      General: There is no distension.      Palpations: Abdomen is soft.   Musculoskeletal:         General: Normal range of motion.   Skin:     General: Skin is warm.   Neurological:      Mental Status: She is alert.           CRANIAL NERVES     CN III, IV, VI   Pupils are equal, round, and reactive to light.       Significant Labs:   All pertinent labs within the past 24 hours have been reviewed.  CBC:   Recent Labs   Lab 01/24/22  1440 01/25/22  0653   WBC 6.18 6.24   HGB 5.8* 9.2*   HCT 20.1* 29.7*    197     CMP:   Recent Labs   Lab 01/24/22  1440 01/25/22  0653    147*   K 3.8 3.8    112*   CO2 29 31*    85   BUN 19 17   CREATININE 1.2 1.2   CALCIUM 8.2* 8.0*   PROT 5.9* 5.9*   ALBUMIN 2.8* 2.7*   BILITOT 0.6 0.9   ALKPHOS 46* 47*   AST 15 19   ALT 15 16   ANIONGAP 6* 4*   EGFRNONAA 46.5* 46.5*       Significant Imaging: I have reviewed all pertinent imaging results/findings within the past 24 hours.

## 2022-01-25 NOTE — ASSESSMENT & PLAN NOTE
Patient with Persistent (7 days or more) atrial fibrillation which is controlled currently with Beta Blocker. Patient is currently in atrial fibrillation.[id number] Score: 2. Anticoagulation indicated. Anticoagulation done with eliquis - however curretnly on hold due to gi bleed and significant anemia.

## 2022-01-25 NOTE — CONSULTS
[facility] - Emergency Department  Cardiology  Consult Note    Patient Name: Aurelia Ruggiero  Patient : 1955  MRN: [id number]  Admission Date: 2022  Hospital Length of Stay: 1 days  Code Status: Full Code   Attending Provider: Marimar Kellogg MD   Consulting Provider: NILAM Palomino  Primary Care Physician: Marimar Kellogg MD  Principal Problem:<principal problem not specified>      Patient information was obtained from patient, past medical records and ER records.     Inpatient consult to Cardiology  Consult performed by: NILAM Palomino  Consult ordered by: Jl Terrazas MD        Subjective:     Chief Complaint:  Weakness, shortness of breath     HPI:   Patient is a 66-year-old female with paroxysmal afib on Eliquis, moderate aortic valvular stenosis, hypothyroidism and mild CVD.    Patient presented to the ER yesterday due to progressive shortness of breath that did not improve with titration of diuretics by primary care.  [place] so weak she could not ambulate to her car for appointment.  Upon arrival to ER, noted to have severe anemia and heme positive stools.  General Surgery evaluation performed and cardiology consult requested.      Past Medical History:   Diagnosis Date    Adult hypothyroidism     Anxiety     Atherosclerosis of both carotid arteries     Atrial fibrillation     Atrial fibrillation     CHF (congestive heart failure)     Moderate aortic valve stenosis        Past Surgical History:   Procedure Laterality Date    APPENDECTOMY      KNEE SURGERY      TONSILLECTOMY      TREATMENT OF CARDIAC ARRHYTHMIA N/A 10/27/2020    Procedure: Cardioversion or Defibrillation;  Surgeon: Gavin Duran MD;  Location: [facility];  Service: Cardiovascular;  Laterality: N/A;  APPROVED PER POORNIMA 10/6       Review of patient's allergies indicates:   Allergen Reactions    Iodine and iodide containing products Edema    Latex, natural rubber Hives       No current facility-administered  medications on file prior to encounter.     Current Outpatient Medications on File Prior to Encounter   Medication Sig    ALPRAZolam (XANAX) 0.5 MG tablet Take 0.5 mg by mouth 2 (two) times daily as needed.    apixaban (ELIQUIS) 5 mg Tab Take 5 mg by mouth 2 (two) times daily.    ascorbic acid, vitamin C, (VITAMIN C) 1000 MG tablet Take 1,000 mg by mouth once daily.    aspirin (ECOTRIN) 81 MG EC tablet Take 81 mg by mouth once daily.    b complex vitamins tablet Take 1 tablet by mouth once daily.    celecoxib (CELEBREX) 200 MG capsule Take 200 mg by mouth once daily.    ferrous sulfate (FEOSOL) 325 mg (65 mg iron) Tab tablet Take 325 mg by mouth daily with breakfast.    furosemide (LASIX) 20 MG tablet Take 20 mg by mouth once daily.    gabapentin (NEURONTIN) 300 MG capsule Take 300 mg by mouth every evening.    HYDROcodone-acetaminophen (NORCO) 7.5-325 mg per tablet Take 1 tablet by mouth daily as needed.    levothyroxine (SYNTHROID) 150 MCG tablet Take 150 mcg by mouth before breakfast.    metoprolol tartrate (LOPRESSOR) 50 MG tablet Take 50 mg by mouth 2 (two) times daily.    multivitamin (THERAGRAN) per tablet Take 1 tablet by mouth once daily.    vitamin D (VITAMIN D3) 1000 units Tab Take 1,000 Units by mouth 2 (two) times a day.     Family History     Problem Relation (Age of Onset)    Atrial fibrillation Sister    Cancer Brother    Heart failure Father    Hypertension Sister, Brother        Tobacco Use    Smoking status: Never Smoker    Smokeless tobacco: Never Used   Substance and Sexual Activity    Alcohol use: Never    Drug use: Never    Sexual activity: Not on file     Review of Systems   Constitutional: Positive for activity change and fatigue.   HENT: Negative.    Eyes: Negative.    Respiratory: Positive for shortness of breath. Negative for cough and wheezing.    Cardiovascular: Negative.    Gastrointestinal: Negative.    Genitourinary: Negative.    Musculoskeletal: Negative.    Skin:  Negative.    Allergic/Immunologic: Negative.    Neurological: Positive for weakness.   Hematological: Negative.    Psychiatric/Behavioral: Negative.      Objective:     Vital Signs (Most Recent):  Temp: 97.8 °F (36.6 °C) (01/25/22 1322)  Pulse: 61 (01/25/22 1322)  Resp: 18 (01/25/22 1322)  BP: 119/72 (01/25/22 1322)  SpO2: 97 % (01/25/22 1322) Vital Signs (24h Range):  Temp:  [97.7 °F (36.5 °C)-98.9 °F (37.2 °C)] 97.8 °F (36.6 °C)  Pulse:  [60-83] 61  Resp:  [16-48] 18  SpO2:  [72 %-100 %] 97 %  BP: ()/(50-72) 119/72     Weight: (!) 142.9 kg (315 lb)  Body mass index is 54.07 kg/m².    SpO2: 97 %  O2 Device (Oxygen Therapy): nasal cannula      Intake/Output Summary (Last 24 hours) at 1/25/2022 1444  Last data filed at 1/25/2022 1144  Gross per 24 hour   Intake 2161.83 ml   Output 1190 ml   Net 971.83 ml       Lines/Drains/Airways     Peripheral Intravenous Line                 Peripheral IV - Single Lumen -- days         Peripheral IV - Single Lumen 01/24/22 1356 20 G Anterior;Left Wrist 1 day                Physical Exam  Vitals and nursing note reviewed.   Constitutional:       General: She is not in acute distress.     Appearance: Normal appearance. She is obese. She is not ill-appearing.   HENT:      Nose: Nose normal.      Mouth/Throat:      Mouth: Mucous membranes are moist.   Eyes:      Pupils: Pupils are equal, round, and reactive to light.   Cardiovascular:      Rate and Rhythm: Normal rate. Rhythm irregular.      Pulses: Normal pulses.   Pulmonary:      Effort: Pulmonary effort is normal.      Breath sounds: Normal breath sounds.   Abdominal:      General: Abdomen is flat.      Palpations: Abdomen is soft.   Skin:     General: Skin is warm.      Capillary Refill: Capillary refill takes less than 2 seconds.   Neurological:      General: No focal deficit present.      Mental Status: She is alert and oriented to person, place, and time.   Psychiatric:         Mood and Affect: Mood normal.          Behavior: Behavior normal.         Significant Labs:  All pertinent lab results from the last 24 hours have been reviewed.   Recent Lab Results       01/25/22  0653   01/24/22  1553   01/24/22  1447   01/24/22  1440        Unit Blood Type Code       5100              5100              5100  [P]       Unit Expiration       [id number]  [P]       Unit Blood Type       O POS              O POS              O POS  [P]       Albumin 2.7       2.8       Alkaline Phosphatase 47       46       ALT 16       15       Anion Gap 4       6       AST 19       15       Baso # 0.07       0.05       Basophil % 1.1       0.8       BILIRUBIN TOTAL 0.9  Comment: For infants and newborns, interpretation of results should be based  on gestational age, weight and in agreement with clinical  observations.    Premature Infant recommended reference ranges:  Up to 24 hours.............<8.0 mg/dL  Up to 48 hours............<12.0 mg/dL  3-5 days..................<15.0 mg/dL  6-29 days.................<15.0 mg/dL    For patients on Eltrombopag therapy, use of Dimension [place] TBIL is   not   recommended.         0.6  Comment: For infants and newborns, interpretation of results should be based  on gestational age, weight and in agreement with clinical  observations.    Premature Infant recommended reference ranges:  Up to 24 hours.............<8.0 mg/dL  Up to 48 hours............<12.0 mg/dL  3-5 days..................<15.0 mg/dL  6-29 days.................<15.0 mg/dL    For patients on Eltrombopag therapy, use of Dimension [place] TBIL is   not   recommended.         BUN 17       19       Calcium 8.0       8.2       Chloride 112       110       CO2 31       29       CODING SYSTEM       [id number]              [id number]              [id number]  [P]       Creatinine 1.2       1.2       Differential Method Automated       Automated       DISPENSE STATUS       TRANSFUSED              TRANSFUSED               ISSUED  [P]       eGFR if  53.7       53.7       eGFR if non  46.5  Comment: Calculation used to obtain the estimated glomerular filtration  rate (eGFR) is the CKD-EPI equation.          46.5  Comment: Calculation used to obtain the estimated glomerular filtration  rate (eGFR) is the CKD-EPI equation.          Eos # 0.2       0.1       Eosinophil % 2.6       1.8       Glucose 85       107       Gran # (ANC) 3.4       3.6       Gran % 54.4       57.6       Group & Rh       O POS       Hematocrit 29.7       20.1       Hemoglobin 9.2       5.8  Comment: Called to and read back by Yola TOMLINSON by PRESLEY 01/24/2022 15:01       Immature Grans (Abs) 0.02  Comment: Mild elevation in immature granulocytes is non specific and   can be seen in a variety of conditions including stress response,   acute inflammation, trauma and pregnancy. Correlation with other   laboratory and clinical findings is essential.         0.01  Comment: Mild elevation in immature granulocytes is non specific and   can be seen in a variety of conditions including stress response,   acute inflammation, trauma and pregnancy. Correlation with other   laboratory and clinical findings is essential.         Immature Granulocytes 0.3       0.2       INDIRECT JASON       NEG       Iron       22       Iron Saturation       6       Lymph # 2.0       1.9       Lymph % 31.3       29.9       MCH 32.7       34.1       MCHC 31.0       28.9              118       Mono # 0.6       0.6       Mono % 10.3       9.7       MPV 11.2       11.2       nRBC 1       2       NT-proBNP       2372       Occult Blood   Positive           Platelets 197       192       Potassium 3.8       3.8       Product Code       C7259F93              [id number]              [id number]  [P]       PROTEIN TOTAL 5.9       5.9        Acceptable     Yes         RBC 2.81       1.70       RDW 23.0       18.4       SARS-CoV-2 RNA, Amplification, Qual      Negative         Sodium 147       145       TIBC       375       Troponin I High Sensitivity       15.9       UNIT NUMBER       [id number]              [id number]              [id number]  [P]       WBC 6.24       6.18              [P] - Preliminary Result             Significant Imaging:  Imaging Results          X-Ray Chest AP Portable (Final result)  Result time 01/24/22 14:40:00    Final result by Nathanael Hill MD (01/24/22 14:40:00)                 Impression:      Chronic elevation right hemidiaphragm.  No acute findings.      Electronically signed by: Nathanael Hill MD  Date:    01/24/2022  Time:    14:40             Narrative:    EXAMINATION:  XR CHEST AP PORTABLE    CLINICAL HISTORY:  Dyspnea, unspecified    COMPARISON:  Chest x-ray 02/13/2013.    FINDINGS:  Unremarkable AP cardiac silhouette.  Chronic elevation right hemidiaphragm with adjacent atelectasis.  No acute consolidation or pleural fluid.                                ECHO: 09/2020:  The study quality is below average.   Technically a difficult study due to body habitus.  The left ventricle is normal in size. The left ventricular ejection fraction is 55%. Global left ventricular systolic function is normal. Noted left ventricular hypertrophy. It is mild. Atrial fibrillation noted.  The left atrial diameter is moderately increased. Left atrial diameter is 4.4 cms.   Moderate calcification of the posterior mitral valve annulus is noted. Moderate calcification of the aortic valve is noted.   Mild (1+) mitral regurgitation. Trace tricuspid regurgitation.   The pulmonary artery systolic pressure is 27 mmHg.   Noted evidence of aortic stenosis. Moderate aortic valve stenosis is present. Aortic valve area continuity equation is 1.1 cm². The trans-aortic peak velocity is 2.9 m/s. The trans-aortic peak gradient is 33.6 mmHg. The trans-aortic mean gradient is 15.3 mmHg. LVOT Diameter is 1.9 cms.           ECG 01/24/2022:  Sinus bradycardia with  rate 54 bpm       MEDICATIONS:     Current Facility-Administered Medications:     0.9%  NaCl infusion (for blood administration), , Intravenous, Q24H PRN, Mairmar Kellogg MD, Given at 01/24/22 2008    ALPRAZolam tablet 0.5 mg, 0.5 mg, Oral, BID PRN, Marimar Kellogg MD, 0.5 mg at 01/24/22 2042    ascorbic acid (vitamin C) tablet 1,000 mg, 1,000 mg, Oral, Daily, Marimar Kellogg MD, 1,000 mg at 01/25/22 1043    furosemide tablet 20 mg, 20 mg, Oral, Daily, Marimar Kellogg MD, 20 mg at 01/25/22 0755    gabapentin capsule 300 mg, 300 mg, Oral, QHS, Mariamr Kellogg MD, 300 mg at 01/24/22 2042    HYDROcodone-acetaminophen 7.5-325 mg per tablet 1 tablet, 1 tablet, Oral, Q6H PRN, Marimar Kellogg MD    levothyroxine tablet 150 mcg, 150 mcg, Oral, Before breakfast, Marimar Kellogg MD, 150 mcg at 01/25/22 0857    melatonin tablet 6 mg, 6 mg, Oral, Nightly PRN, Marimar Kellogg MD, 6 mg at 01/24/22 2041    metoprolol tartrate (LOPRESSOR) tablet 50 mg, 50 mg, Oral, BID, Marimar Kellogg MD, 50 mg at 01/25/22 0754    ondansetron injection 4 mg, 4 mg, Intravenous, Q8H PRN, Marimar Kellogg MD    pantoprazole injection 40 mg, 40 mg, Intravenous, BID, Marimra Kellogg MD, 40 mg at 01/25/22 0755    sodium chloride 0.9% flush 10 mL, 10 mL, Intravenous, PRN, Marimar Kellogg MD    Current Outpatient Medications:     ALPRAZolam (XANAX) 0.5 MG tablet, Take 0.5 mg by mouth 2 (two) times daily as needed., Disp: , Rfl:     apixaban (ELIQUIS) 5 mg Tab, Take 5 mg by mouth 2 (two) times daily., Disp: , Rfl:     ascorbic acid, vitamin C, (VITAMIN C) 1000 MG tablet, Take 1,000 mg by mouth once daily., Disp: , Rfl:     aspirin (ECOTRIN) 81 MG EC tablet, Take 81 mg by mouth once daily., Disp: , Rfl:     b complex vitamins tablet, Take 1 tablet by mouth once daily., Disp: , Rfl:     celecoxib (CELEBREX) 200 MG capsule, Take 200 mg by mouth once daily., Disp: , Rfl:     ferrous sulfate  (FEOSOL) 325 mg (65 mg iron) Tab tablet, Take 325 mg by mouth daily with breakfast., Disp: , Rfl:     furosemide (LASIX) 20 MG tablet, Take 20 mg by mouth once daily., Disp: , Rfl:     gabapentin (NEURONTIN) 300 MG capsule, Take 300 mg by mouth every evening., Disp: , Rfl:     HYDROcodone-acetaminophen (NORCO) 7.5-325 mg per tablet, Take 1 tablet by mouth daily as needed., Disp: , Rfl:     levothyroxine (SYNTHROID) 150 MCG tablet, Take 150 mcg by mouth before breakfast., Disp: , Rfl:     metoprolol tartrate (LOPRESSOR) 50 MG tablet, Take 50 mg by mouth 2 (two) times daily., Disp: , Rfl:     multivitamin (THERAGRAN) per tablet, Take 1 tablet by mouth once daily., Disp: , Rfl:     vitamin D (VITAMIN D3) 1000 units Tab, Take 1,000 Units by mouth 2 (two) times a day., Disp: , Rfl:       Assessment and Plan:     Active Diagnoses:    Diagnosis Date Noted POA    Anemia [D64.9] 01/25/2022 Yes    GI bleed [K92.2] 01/25/2022 Yes    SOB (shortness of breath) [R06.02] 01/25/2022 Yes    Persistent atrial fibrillation [I48.19] 10/27/2020 Yes      Problems Resolved During this Admission:       VTE Risk Mitigation (From admission, onward)         Ordered     IP VTE HIGH RISK PATIENT  Once         01/24/22 1744     Place sequential compression device  Until discontinued         01/24/22 1744              Assessment and Plan:    1.  GI bleed with anemia:  H/H improved with blood transfusion.  General surgery following.  Eliquis on hold.     2.  PAF:  Initial ECG showed NSR but appears to have atrial fibrillation with CVR on telemetry. Obtain ECG  Continue metoprolol and resume amiodarone 50mg PO daily     3.  Aortic Stenosis:  Echocardiogram ordered by surgery.  Will report results once testing completed.         Thank you for your consult.          Karen Warner, NILAM  Cardiology  [facility] - Emergency Department  01/25/2022

## 2022-01-25 NOTE — PLAN OF CARE
01/25/22 0945   Discharge Assessment   Assessment Type Discharge Planning Assessment   Confirmed/corrected address, phone number and insurance Yes   Confirmed Demographics Correct on Facesheet   Source of Information patient   When was your last doctors appointment?   (Sometime 6 months ago)   Communicated [place] with patient/caregiver Date not available/Unable to determine   Reason For Admission Anemia   Lives With alone   Facility Arrived From: Home   Do you expect to return to your current living situation? Yes   Do you have help at home or someone to help you manage your care at home? No   Prior to hospitilization cognitive status: Alert/Oriented   Current cognitive status: Alert/Oriented   Walking or Climbing Stairs Difficulty ambulation difficulty, requires equipment   Mobility Management Quad cane or standard walker   Dressing/Bathing Difficulty none   Home Accessibility not wheelchair accessible   Home Layout Able to live on 1st floor   Equipment Currently Used at Home cane, quad;walker, standard   Readmission within 30 days? No   Patient currently being followed by outpatient case management? No   Do you currently have service(s) that help you manage your care at home? No   Do you take prescription medications? Yes   Is the patient taking medications as prescribed? yes   Who is going to help you get home at discharge? Rito Ruggiero (son)   How do you get to doctors appointments? car, drives self   Are you on dialysis? No   Do you take coumadin? No   Discharge Plan A Home   Discharge Plan B Home   DME Needed Upon Discharge  none   Discharge Plan discussed with: Patient;Adult children   Discharge Barriers Identified None       CM discharge assessment complete and pt's son Rito Ruggiero was present in the room during the assessment. Pt was awake, alert and oriented. Prior to hospital admission she utilized a quad cane for ambulation and a standard walker as needed. She currently works and still drives and  runs her own errands. She could not remember the last time she visited her PCP, but noted that she goes every 6 months for a check-up. Pt's son will be helping her get home at discharge. No DME or services anticipated at discharge.

## 2022-01-25 NOTE — [facility] SECONDARY REVIEW
Physician Advisor External    PA - Medical Necessity Issue    Approved Inpatient     Sent to [employer] for secondary review. Inpatient level of care recommended.

## 2022-01-26 ENCOUNTER — CLINICAL SUPPORT (OUTPATIENT)
Dept: CARDIOLOGY | Facility: HOSPITAL | Age: 67
DRG: 378 | End: 2022-01-26
Attending: SURGERY
Payer: MEDICARE

## 2022-01-26 VITALS
DIASTOLIC BLOOD PRESSURE: 67 MMHG | WEIGHT: 293 LBS | HEIGHT: 64 IN | SYSTOLIC BLOOD PRESSURE: 121 MMHG | BODY MASS INDEX: 50.02 KG/M2

## 2022-01-26 LAB
ANION GAP SERPL CALC-SCNC: 3 MMOL/L (ref 8–16)
AORTIC ROOT ANNULUS: 3.23 CM
AORTIC VALVE CUSP SEPERATION: 0.47 CM
AV INDEX (PROSTH): 0.33
AV MEAN GRADIENT: 35 MMHG
AV PEAK GRADIENT: 54 MMHG
AV VALVE AREA: 1.02 CM2
AV VELOCITY RATIO: 0.31
BASOPHILS # BLD AUTO: 0.06 K/UL (ref 0–0.2)
BASOPHILS NFR BLD: 0.9 % (ref 0–1.9)
BSA FOR ECHO PROCEDURE: 2.54 M2
BUN SERPL-MCNC: 16 MG/DL (ref 8–23)
CALCIUM SERPL-MCNC: 8.1 MG/DL (ref 8.7–10.5)
CHLORIDE SERPL-SCNC: 111 MMOL/L (ref 95–110)
CO2 SERPL-SCNC: 34 MMOL/L (ref 23–29)
CREAT SERPL-MCNC: 1.2 MG/DL (ref 0.5–1.4)
CV ECHO LV RWT: 0.56 CM
DIFFERENTIAL METHOD: ABNORMAL
DOP CALC AO PEAK VEL: 3.67 M/S
DOP CALC AO VTI: 87.15 CM
DOP CALC LVOT AREA: 3 CM2
DOP CALC LVOT DIAMETER: 1.97 CM
DOP CALC LVOT PEAK VEL: 1.12 M/S
DOP CALC LVOT STROKE VOLUME: 88.93 CM3
DOP CALCLVOT PEAK VEL VTI: 29.19 CM
E WAVE DECELERATION TIME: 237.2 MSEC
E/A RATIO: 2.67
ECHO LV POSTERIOR WALL: 1.38 CM (ref 0.6–1.1)
EJECTION FRACTION: 68 %
EOSINOPHIL # BLD AUTO: 0.2 K/UL (ref 0–0.5)
EOSINOPHIL NFR BLD: 2.6 % (ref 0–8)
ERYTHROCYTE [DISTWIDTH] IN BLOOD BY AUTOMATED COUNT: 22.4 % (ref 11.5–14.5)
EST. GFR  (AFRICAN AMERICAN): 53.7 ML/MIN/1.73 M^2
EST. GFR  (NON AFRICAN AMERICAN): 46.5 ML/MIN/1.73 M^2
FRACTIONAL SHORTENING: 38 % (ref 28–44)
GLUCOSE SERPL-MCNC: 91 MG/DL (ref 70–110)
HCT VFR BLD AUTO: 33.5 % (ref 37–48.5)
HGB BLD-MCNC: 10.3 G/DL (ref 12–16)
IMM GRANULOCYTES # BLD AUTO: 0.02 K/UL (ref 0–0.04)
IMM GRANULOCYTES NFR BLD AUTO: 0.3 % (ref 0–0.5)
INTERVENTRICULAR SEPTUM: 1.48 CM (ref 0.6–1.1)
LEFT ATRIUM SIZE: 4.92 CM
LEFT INTERNAL DIMENSION IN SYSTOLE: 3.08 CM (ref 2.1–4)
LEFT VENTRICLE DIASTOLIC VOLUME INDEX: 49.28 ML/M2
LEFT VENTRICLE DIASTOLIC VOLUME: 116.79 ML
LEFT VENTRICLE MASS INDEX: 126 G/M2
LEFT VENTRICLE SYSTOLIC VOLUME INDEX: 15.8 ML/M2
LEFT VENTRICLE SYSTOLIC VOLUME: 37.45 ML
LEFT VENTRICULAR INTERNAL DIMENSION IN DIASTOLE: 4.97 CM (ref 3.5–6)
LEFT VENTRICULAR MASS: 297.86 G
LYMPHOCYTES # BLD AUTO: 1.9 K/UL (ref 1–4.8)
LYMPHOCYTES NFR BLD: 27.7 % (ref 18–48)
MCH RBC QN AUTO: 32.5 PG (ref 27–31)
MCHC RBC AUTO-ENTMCNC: 30.7 G/DL (ref 32–36)
MCV RBC AUTO: 106 FL (ref 82–98)
MONOCYTES # BLD AUTO: 0.7 K/UL (ref 0.3–1)
MONOCYTES NFR BLD: 10.6 % (ref 4–15)
MV PEAK A VEL: 0.46 M/S
MV PEAK E VEL: 1.23 M/S
MV STENOSIS PRESSURE HALF TIME: 68.79 MS
MV VALVE AREA P 1/2 METHOD: 3.2 CM2
NEUTROPHILS # BLD AUTO: 4.1 K/UL (ref 1.8–7.7)
NEUTROPHILS NFR BLD: 57.9 % (ref 38–73)
NRBC BLD-RTO: 0 /100 WBC
PISA MRMAX VEL: 0.06 M/S
PISA TR MAX VEL: 2.71 M/S
PLATELET # BLD AUTO: 222 K/UL (ref 150–450)
PMV BLD AUTO: 11 FL (ref 9.2–12.9)
POTASSIUM SERPL-SCNC: 4.3 MMOL/L (ref 3.5–5.1)
PV PEAK VELOCITY: 0.84 CM/S
RA PRESSURE: 8 MMHG
RA WIDTH: 4.2 CM
RBC # BLD AUTO: 3.17 M/UL (ref 4–5.4)
RIGHT VENTRICULAR END-DIASTOLIC DIMENSION: 3.1 CM
SODIUM SERPL-SCNC: 148 MMOL/L (ref 136–145)
TR MAX PG: 29 MMHG
TV REST PULMONARY ARTERY PRESSURE: 37 MMHG
WBC # BLD AUTO: 6.98 K/UL (ref 3.9–12.7)

## 2022-01-26 PROCEDURE — 25000003 PHARM REV CODE 250: Performed by: INTERNAL MEDICINE

## 2022-01-26 PROCEDURE — 11000001 HC ACUTE MED/SURG PRIVATE ROOM

## 2022-01-26 PROCEDURE — 93306 TTE W/DOPPLER COMPLETE: CPT

## 2022-01-26 PROCEDURE — 36410 VNPNXR 3YR/> PHY/QHP DX/THER: CPT

## 2022-01-26 PROCEDURE — 85025 COMPLETE CBC W/AUTO DIFF WBC: CPT | Performed by: SURGERY

## 2022-01-26 PROCEDURE — C9113 INJ PANTOPRAZOLE SODIUM, VIA: HCPCS | Performed by: INTERNAL MEDICINE

## 2022-01-26 PROCEDURE — 94761 N-INVAS EAR/PLS OXIMETRY MLT: CPT

## 2022-01-26 PROCEDURE — 63600175 PHARM REV CODE 636 W HCPCS: Performed by: INTERNAL MEDICINE

## 2022-01-26 PROCEDURE — 80048 BASIC METABOLIC PNL TOTAL CA: CPT | Performed by: SURGERY

## 2022-01-26 PROCEDURE — 36415 COLL VENOUS BLD VENIPUNCTURE: CPT | Performed by: SURGERY

## 2022-01-26 PROCEDURE — 25000003 PHARM REV CODE 250: Performed by: NURSE PRACTITIONER

## 2022-01-26 PROCEDURE — 99900031 HC PATIENT EDUCATION (STAT)

## 2022-01-26 PROCEDURE — 27000221 HC OXYGEN, UP TO 24 HOURS

## 2022-01-26 PROCEDURE — 99900035 HC TECH TIME PER 15 MIN (STAT)

## 2022-01-26 RX ADMIN — HYDROCODONE BITARTRATE AND ACETAMINOPHEN 1 TABLET: 7.5; 325 TABLET ORAL at 08:01

## 2022-01-26 RX ADMIN — LEVOTHYROXINE SODIUM 150 MCG: 0.15 TABLET ORAL at 05:01

## 2022-01-26 RX ADMIN — PANTOPRAZOLE SODIUM 40 MG: 40 INJECTION, POWDER, FOR SOLUTION INTRAVENOUS at 08:01

## 2022-01-26 RX ADMIN — GABAPENTIN 300 MG: 300 CAPSULE ORAL at 08:01

## 2022-01-26 RX ADMIN — METOPROLOL TARTRATE 50 MG: 50 TABLET, FILM COATED ORAL at 08:01

## 2022-01-26 RX ADMIN — FUROSEMIDE 20 MG: 20 TABLET ORAL at 08:01

## 2022-01-26 RX ADMIN — OXYCODONE HYDROCHLORIDE AND ACETAMINOPHEN 1000 MG: 500 TABLET ORAL at 08:01

## 2022-01-26 RX ADMIN — AMIODARONE HYDROCHLORIDE 100 MG: 100 TABLET ORAL at 08:01

## 2022-01-26 RX ADMIN — ALPRAZOLAM 0.5 MG: 0.5 TABLET ORAL at 08:01

## 2022-01-26 NOTE — ASSESSMENT & PLAN NOTE
Patient with Persistent (7 days or more) atrial fibrillation which is controlled currently with Beta Blocker. Patient is currently in atrial fibrillation.[id number] Score: 2. Anticoagulation indicated. Anticoagulation done with eliquis - however curretnly on hold due to gi bleed and significant anemia.

## 2022-01-26 NOTE — ED NOTES
Whitney with ultrasound reports she is unable to do patient's echo at this time due to equipment failure.

## 2022-01-26 NOTE — SUBJECTIVE & OBJECTIVE
Past Medical History:   Diagnosis Date    Adult hypothyroidism     Anxiety     Atherosclerosis of both carotid arteries     Atrial fibrillation     Atrial fibrillation     CHF (congestive heart failure)     Moderate aortic valve stenosis        Past Surgical History:   Procedure Laterality Date    APPENDECTOMY      KNEE SURGERY      TONSILLECTOMY      TREATMENT OF CARDIAC ARRHYTHMIA N/A 10/27/2020    Procedure: Cardioversion or Defibrillation;  Surgeon: Gavin Duran MD;  Location: [facility];  Service: Cardiovascular;  Laterality: N/A;  APPROVED PER POORNIMA 10/6       Review of patient's allergies indicates:   Allergen Reactions    Iodine and iodide containing products Edema    Latex, natural rubber Hives       No current facility-administered medications on file prior to encounter.     Current Outpatient Medications on File Prior to Encounter   Medication Sig    ALPRAZolam (XANAX) 0.5 MG tablet Take 0.5 mg by mouth 2 (two) times daily as needed.    apixaban (ELIQUIS) 5 mg Tab Take 5 mg by mouth 2 (two) times daily.    ascorbic acid, vitamin C, (VITAMIN C) 1000 MG tablet Take 1,000 mg by mouth once daily.    aspirin (ECOTRIN) 81 MG EC tablet Take 81 mg by mouth once daily.    b complex vitamins tablet Take 1 tablet by mouth once daily.    celecoxib (CELEBREX) 200 MG capsule Take 200 mg by mouth once daily.    ferrous sulfate (FEOSOL) 325 mg (65 mg iron) Tab tablet Take 325 mg by mouth daily with breakfast.    furosemide (LASIX) 20 MG tablet Take 20 mg by mouth once daily.    gabapentin (NEURONTIN) 300 MG capsule Take 300 mg by mouth every evening.    HYDROcodone-acetaminophen (NORCO) 7.5-325 mg per tablet Take 1 tablet by mouth daily as needed.    levothyroxine (SYNTHROID) 150 MCG tablet Take 150 mcg by mouth before breakfast.    metoprolol tartrate (LOPRESSOR) 50 MG tablet Take 50 mg by mouth 2 (two) times daily.    multivitamin (THERAGRAN) per tablet Take 1 tablet by mouth once daily.     vitamin D (VITAMIN D3) 1000 units Tab Take 1,000 Units by mouth 2 (two) times a day.     Family History     Problem Relation (Age of Onset)    Atrial fibrillation Sister    Cancer Brother    Heart failure Father    Hypertension Sister, Brother        Tobacco Use    Smoking status: Never Smoker    Smokeless tobacco: Never Used   Substance and Sexual Activity    Alcohol use: Never    Drug use: Never    Sexual activity: Not on file     Review of Systems   Constitutional: Positive for activity change. Negative for chills, fatigue and fever.   HENT: Negative for ear pain, postnasal drip and sore throat.    Respiratory: Negative for cough, choking, shortness of breath and wheezing.    Cardiovascular: Negative for chest pain, palpitations and leg swelling.   Gastrointestinal: Negative for abdominal pain, blood in stool, nausea and vomiting.   Genitourinary: Negative for difficulty urinating.   Musculoskeletal: Positive for arthralgias and back pain.   Skin: Negative for rash and wound.   Neurological: Positive for weakness. Negative for syncope.     Objective:     Vital Signs (Most Recent):  Temp: 98.5 °F (36.9 °C) (01/26/22 1410)  Pulse: 69 (01/26/22 1410)  Resp: 18 (01/26/22 1410)  BP: 115/79 (01/26/22 1410)  SpO2: (!) 92 % (01/26/22 1410) Vital Signs (24h Range):  Temp:  [97.6 °F (36.4 °C)-98.5 °F (36.9 °C)] 98.5 °F (36.9 °C)  Pulse:  [65-72] 69  Resp:  [18-34] 18  SpO2:  [90 %-96 %] 92 %  BP: (104-131)/(50-79) 115/79     Weight: (!) 142.9 kg (315 lb)  Body mass index is 54.07 kg/m².    Physical Exam  Constitutional:       Appearance: Normal appearance.      Comments: Obese female   HENT:      Nose: Nose normal.      Mouth/Throat:      Mouth: Mucous membranes are moist.   Eyes:      Extraocular Movements: Extraocular movements intact.      Pupils: Pupils are equal, round, and reactive to light.   Cardiovascular:      Rate and Rhythm: Normal rate and regular rhythm.   Pulmonary:      Effort: Pulmonary effort is  normal. No respiratory distress.      Breath sounds: Normal breath sounds. No wheezing.   Abdominal:      General: There is no distension.      Palpations: Abdomen is soft.   Musculoskeletal:         General: Normal range of motion.   Skin:     General: Skin is warm.   Neurological:      Mental Status: She is alert.           CRANIAL NERVES     CN III, IV, VI   Pupils are equal, round, and reactive to light.       Significant Labs:   All pertinent labs within the past 24 hours have been reviewed.  CBC:   Recent Labs   Lab 01/25/22  0653 01/26/22  0819   WBC 6.24 6.98   HGB 9.2* 10.3*   HCT 29.7* 33.5*    222     CMP:   Recent Labs   Lab 01/25/22  0653 01/26/22  0819   * 148*   K 3.8 4.3   * 111*   CO2 31* 34*   GLU 85 91   BUN 17 16   CREATININE 1.2 1.2   CALCIUM 8.0* 8.1*   PROT 5.9*  --    ALBUMIN 2.7*  --    BILITOT 0.9  --    ALKPHOS 47*  --    AST 19  --    ALT 16  --    ANIONGAP 4* 3*   EGFRNONAA 46.5* 46.5*       Significant Imaging: I have reviewed all pertinent imaging results/findings within the past 24 hours.

## 2022-01-26 NOTE — PROGRESS NOTES
[facility] Medicine  Progress Note    Patient Name: Aurelia Ruggiero  MRN: [id number]  Patient Class: IP- Inpatient   Admission Date: 1/24/2022  Length of Stay: 2 days  Attending Physician: Marimar Kellogg MD  Primary Care Provider: Marimar Kellogg MD        Subjective:     Principal Problem:GI bleed        HPI:    67 yo female with history of atrial fibrillation on eliquis here via EMS with BRIGGS. Gradual onset x 1 week. No CP. Symptoms resolve at rest. No fever. Denies any hemorrhage. No prior similar.  Pt denies any brbpr or melena, or heartburn.       Overview/Hospital Course:  1/26 ND pt feelign better- less sob this am.  No chest pain - awaiting cardiac clearance to be able to do scopes      Past Medical History:   Diagnosis Date    Adult hypothyroidism     Anxiety     Atherosclerosis of both carotid arteries     Atrial fibrillation     Atrial fibrillation     CHF (congestive heart failure)     Moderate aortic valve stenosis        Past Surgical History:   Procedure Laterality Date    APPENDECTOMY      KNEE SURGERY      TONSILLECTOMY      TREATMENT OF CARDIAC ARRHYTHMIA N/A 10/27/2020    Procedure: Cardioversion or Defibrillation;  Surgeon: Gavin Duran MD;  Location: [facility];  Service: Cardiovascular;  Laterality: N/A;  APPROVED PER POORNIMA 10/6       Review of patient's allergies indicates:   Allergen Reactions    Iodine and iodide containing products Edema    Latex, natural rubber Hives       No current facility-administered medications on file prior to encounter.     Current Outpatient Medications on File Prior to Encounter   Medication Sig    ALPRAZolam (XANAX) 0.5 MG tablet Take 0.5 mg by mouth 2 (two) times daily as needed.    apixaban (ELIQUIS) 5 mg Tab Take 5 mg by mouth 2 (two) times daily.    ascorbic acid, vitamin C, (VITAMIN C) 1000 MG tablet Take 1,000 mg by mouth once daily.    aspirin (ECOTRIN) 81 MG EC tablet Take 81 mg by mouth once daily.    b  complex vitamins tablet Take 1 tablet by mouth once daily.    celecoxib (CELEBREX) 200 MG capsule Take 200 mg by mouth once daily.    ferrous sulfate (FEOSOL) 325 mg (65 mg iron) Tab tablet Take 325 mg by mouth daily with breakfast.    furosemide (LASIX) 20 MG tablet Take 20 mg by mouth once daily.    gabapentin (NEURONTIN) 300 MG capsule Take 300 mg by mouth every evening.    HYDROcodone-acetaminophen (NORCO) 7.5-325 mg per tablet Take 1 tablet by mouth daily as needed.    levothyroxine (SYNTHROID) 150 MCG tablet Take 150 mcg by mouth before breakfast.    metoprolol tartrate (LOPRESSOR) 50 MG tablet Take 50 mg by mouth 2 (two) times daily.    multivitamin (THERAGRAN) per tablet Take 1 tablet by mouth once daily.    vitamin D (VITAMIN D3) 1000 units Tab Take 1,000 Units by mouth 2 (two) times a day.     Family History     Problem Relation (Age of Onset)    Atrial fibrillation Sister    Cancer Brother    Heart failure Father    Hypertension Sister, Brother        Tobacco Use    Smoking status: Never Smoker    Smokeless tobacco: Never Used   Substance and Sexual Activity    Alcohol use: Never    Drug use: Never    Sexual activity: Not on file     Review of Systems   Constitutional: Positive for activity change. Negative for chills, fatigue and fever.   HENT: Negative for ear pain, postnasal drip and sore throat.    Respiratory: Negative for cough, choking, shortness of breath and wheezing.    Cardiovascular: Negative for chest pain, palpitations and leg swelling.   Gastrointestinal: Negative for abdominal pain, blood in stool, nausea and vomiting.   Genitourinary: Negative for difficulty urinating.   Musculoskeletal: Positive for arthralgias and back pain.   Skin: Negative for rash and wound.   Neurological: Positive for weakness. Negative for syncope.     Objective:     Vital Signs (Most Recent):  Temp: 98.5 °F (36.9 °C) (01/26/22 1410)  Pulse: 69 (01/26/22 1410)  Resp: 18 (01/26/22 1410)  BP: 115/79  (01/26/22 1410)  SpO2: (!) 92 % (01/26/22 1410) Vital Signs (24h Range):  Temp:  [97.6 °F (36.4 °C)-98.5 °F (36.9 °C)] 98.5 °F (36.9 °C)  Pulse:  [65-72] 69  Resp:  [18-34] 18  SpO2:  [90 %-96 %] 92 %  BP: (104-131)/(50-79) 115/79     Weight: (!) 142.9 kg (315 lb)  Body mass index is 54.07 kg/m².    Physical Exam  Constitutional:       Appearance: Normal appearance.      Comments: Obese female   HENT:      Nose: Nose normal.      Mouth/Throat:      Mouth: Mucous membranes are moist.   Eyes:      Extraocular Movements: Extraocular movements intact.      Pupils: Pupils are equal, round, and reactive to light.   Cardiovascular:      Rate and Rhythm: Normal rate and regular rhythm.   Pulmonary:      Effort: Pulmonary effort is normal. No respiratory distress.      Breath sounds: Normal breath sounds. No wheezing.   Abdominal:      General: There is no distension.      Palpations: Abdomen is soft.   Musculoskeletal:         General: Normal range of motion.   Skin:     General: Skin is warm.   Neurological:      Mental Status: She is alert.           CRANIAL NERVES     CN III, IV, VI   Pupils are equal, round, and reactive to light.       Significant Labs:   All pertinent labs within the past 24 hours have been reviewed.  CBC:   Recent Labs   Lab 01/25/22  0653 01/26/22  0819   WBC 6.24 6.98   HGB 9.2* 10.3*   HCT 29.7* 33.5*    222     CMP:   Recent Labs   Lab 01/25/22  0653 01/26/22  0819   * 148*   K 3.8 4.3   * 111*   CO2 31* 34*   GLU 85 91   BUN 17 16   CREATININE 1.2 1.2   CALCIUM 8.0* 8.1*   PROT 5.9*  --    ALBUMIN 2.7*  --    BILITOT 0.9  --    ALKPHOS 47*  --    AST 19  --    ALT 16  --    ANIONGAP 4* 3*   EGFRNONAA 46.5* 46.5*       Significant Imaging: I have reviewed all pertinent imaging results/findings within the past 24 hours.      Assessment/Plan:      * GI bleed  Positive fobt - surgery seeeing pt for poss egd/colon - protonix      SOB (shortness of breath)  Sec to significant anemia  - improved after blood transfusion  1/26 improved    Anemia  Pt with significant iron def anemia - sp 3 u prbcs - repeat h/h improved - gilberto giove dose of venofer - pt with positive fobt   1/26 h/h improved - cont to monitor h/h      Persistent atrial fibrillation  Patient with Persistent (7 days or more) atrial fibrillation which is controlled currently with Beta Blocker. Patient is currently in atrial fibrillation.[id number] Score: 2. Anticoagulation indicated. Anticoagulation done with eliquis - however curretnly on hold due to gi bleed and significant anemia.          VTE Risk Mitigation (From admission, onward)         Ordered     IP VTE HIGH RISK PATIENT  Once         01/24/22 1744     Place sequential compression device  Until discontinued         01/24/22 1744                Discharge Planning   PARISH:      Code Status: Full Code   Is the patient medically ready for discharge?:     Reason for patient still in hospital (select all that apply): Treatment  Discharge Plan A: Home                  Marimar Kellogg MD  Department of Hospital Medicine   [facility] Surg

## 2022-01-26 NOTE — ED NOTES
Patient assigned to room 649. [facility] [place] nurse unable to take patient at this time.

## 2022-01-26 NOTE — PROGRESS NOTES
[facility] Surg  General Surgery  Progress Note    Subjective:     Interval History:  Talked to Ms. Moses this morning.  She reports to me that she is doing much better once she has been transfused.  Denies any shortness of breath and moves in the bed easy year.  She denies any chest pain.  She has been seen by Cardiology and at the time of the evaluation, it was felt that she had reverted back to atrial fibrillation which was confirmed on the 2nd EKG.  She also underwent an echo this morning however has not been read yet.    Post-Op Info:  * No surgery found *          Medications:  Continuous Infusions:  Scheduled Meds:   amiodarone  100 mg Oral Daily    ascorbic acid (vitamin C)  1,000 mg Oral Daily    furosemide  20 mg Oral Daily    gabapentin  300 mg Oral QHS    levothyroxine  150 mcg Oral Before breakfast    metoprolol tartrate  50 mg Oral BID    pantoprazole  40 mg Intravenous BID     PRN Meds:sodium chloride, ALPRAZolam, HYDROcodone-acetaminophen, melatonin, ondansetron, sodium chloride 0.9%     Objective:     Vital Signs (Most Recent):  Temp: 98.5 °F (36.9 °C) (01/26/22 1410)  Pulse: 69 (01/26/22 1410)  Resp: 18 (01/26/22 1410)  BP: 115/79 (01/26/22 1410)  SpO2: (!) 92 % (01/26/22 1410) Vital Signs (24h Range):  Temp:  [97.6 °F (36.4 °C)-98.5 °F (36.9 °C)] 98.5 °F (36.9 °C)  Pulse:  [65-72] 69  Resp:  [18-34] 18  SpO2:  [90 %-96 %] 92 %  BP: (104-131)/(50-79) 115/79       Intake/Output Summary (Last 24 hours) at 1/26/2022 1530  Last data filed at 1/26/2022 0600  Gross per 24 hour   Intake --   Output 520 ml   Net -520 ml       Physical Exam  Constitutional:       Appearance: Normal appearance. She is obese.   Eyes:      Conjunctiva/sclera: Conjunctivae normal.   Abdominal:      General: Abdomen is flat. There is no distension.      Palpations: Abdomen is soft.      Tenderness: There is no abdominal tenderness.   Musculoskeletal:      Cervical back: Neck supple.   Lymphadenopathy:      Cervical: No  cervical adenopathy.   Skin:     General: Skin is warm and dry.      Coloration: Skin is not jaundiced.   Neurological:      General: No focal deficit present.      Mental Status: She is alert.         Significant Labs:  BMP:   Recent Labs   Lab 01/26/22  0819   GLU 91   *   K 4.3   *   CO2 34*   BUN 16   CREATININE 1.2   CALCIUM 8.1*     CBC:   Recent Labs   Lab 01/26/22 0819   WBC 6.98   RBC 3.17*   HGB 10.3*   HCT 33.5*      *   MCH 32.5*   MCHC 30.7*       Significant Diagnostics:  I have reviewed all pertinent imaging results/findings within the past 24 hours.    Assessment/Plan:     Active Diagnoses:    Diagnosis Date Noted POA    Anemia [D64.9] the anemia has been improved after the transfusions and the patient is not symptomatic. 01/25/2022 Yes    GI bleed [K92.2] since Cardiology evaluation is not done at this time we will feed the patient and arrange for the colonoscopy on Friday.  By then we should have the answer regarding cardiology recommendations and the reading of the echo 01/25/2022 Yes    SOB (shortness of breath) [R06.02] 01/25/2022 Yes    Persistent atrial fibrillation [I48.19] 10/27/2020 Yes      Problems Resolved During this Admission:         Jl Terrazas MD  General Surgery  [facility] Surg

## 2022-01-26 NOTE — HOSPITAL COURSE
1/26 ND pt feelign better- less sob this am.  No chest pain - awaiting cardiac clearance to be able to do scopes    1/27 SD: Pt reports continued improvement of symptoms. H&H stable. Awaiting cardiac clearance for egd/colonoscopy with Dr. Terrazas.    1/28 SD: EGD/Colonoscopy with Dr. Terrazas this morning - indicates erosive gastritis with no active bleeding. Discharge home with Protonix BID, frequent CBC checks, and close outpatient follow-up.

## 2022-01-26 NOTE — ED NOTES
Patient reports she took her nasal cannula off at around 2200 last night. Oxygen saturations this morning are 92%-97%. Dr. Bess eduardo.

## 2022-01-26 NOTE — ASSESSMENT & PLAN NOTE
Pt with significant iron def anemia - sp 3 u prbcs - repeat h/h improved - gilberto singh dose of venofer - pt with positive fobt   1/26 h/h improved - cont to monitor h/h

## 2022-01-27 LAB
ALBUMIN SERPL BCP-MCNC: 2.8 G/DL (ref 3.5–5.2)
ALP SERPL-CCNC: 56 U/L (ref 55–135)
ALT SERPL W/O P-5'-P-CCNC: 16 U/L (ref 10–44)
ANION GAP SERPL CALC-SCNC: 5 MMOL/L (ref 8–16)
AST SERPL-CCNC: 16 U/L (ref 10–40)
BASOPHILS # BLD AUTO: 0.07 K/UL (ref 0–0.2)
BASOPHILS NFR BLD: 1.1 % (ref 0–1.9)
BILIRUB SERPL-MCNC: 0.7 MG/DL (ref 0.1–1)
BUN SERPL-MCNC: 15 MG/DL (ref 8–23)
CALCIUM SERPL-MCNC: 8.1 MG/DL (ref 8.7–10.5)
CHLORIDE SERPL-SCNC: 110 MMOL/L (ref 95–110)
CO2 SERPL-SCNC: 30 MMOL/L (ref 23–29)
CREAT SERPL-MCNC: 1.3 MG/DL (ref 0.5–1.4)
DIFFERENTIAL METHOD: ABNORMAL
EOSINOPHIL # BLD AUTO: 0.2 K/UL (ref 0–0.5)
EOSINOPHIL NFR BLD: 3.7 % (ref 0–8)
ERYTHROCYTE [DISTWIDTH] IN BLOOD BY AUTOMATED COUNT: 20.9 % (ref 11.5–14.5)
EST. GFR  (AFRICAN AMERICAN): 48.7 ML/MIN/1.73 M^2
EST. GFR  (NON AFRICAN AMERICAN): 42.3 ML/MIN/1.73 M^2
GLUCOSE SERPL-MCNC: 149 MG/DL (ref 70–110)
HCT VFR BLD AUTO: 36.3 % (ref 37–48.5)
HGB BLD-MCNC: 11.2 G/DL (ref 12–16)
IMM GRANULOCYTES # BLD AUTO: 0.02 K/UL (ref 0–0.04)
IMM GRANULOCYTES NFR BLD AUTO: 0.3 % (ref 0–0.5)
LYMPHOCYTES # BLD AUTO: 1.8 K/UL (ref 1–4.8)
LYMPHOCYTES NFR BLD: 28.4 % (ref 18–48)
MCH RBC QN AUTO: 32.7 PG (ref 27–31)
MCHC RBC AUTO-ENTMCNC: 30.9 G/DL (ref 32–36)
MCV RBC AUTO: 106 FL (ref 82–98)
MONOCYTES # BLD AUTO: 0.6 K/UL (ref 0.3–1)
MONOCYTES NFR BLD: 9.9 % (ref 4–15)
NEUTROPHILS # BLD AUTO: 3.7 K/UL (ref 1.8–7.7)
NEUTROPHILS NFR BLD: 56.6 % (ref 38–73)
NRBC BLD-RTO: 1 /100 WBC
PLATELET # BLD AUTO: 236 K/UL (ref 150–450)
PMV BLD AUTO: 11.4 FL (ref 9.2–12.9)
POTASSIUM SERPL-SCNC: 3.5 MMOL/L (ref 3.5–5.1)
PROT SERPL-MCNC: 6.4 G/DL (ref 6–8.4)
RBC # BLD AUTO: 3.43 M/UL (ref 4–5.4)
SODIUM SERPL-SCNC: 145 MMOL/L (ref 136–145)
WBC # BLD AUTO: 6.49 K/UL (ref 3.9–12.7)

## 2022-01-27 PROCEDURE — 94761 N-INVAS EAR/PLS OXIMETRY MLT: CPT

## 2022-01-27 PROCEDURE — 25000003 PHARM REV CODE 250

## 2022-01-27 PROCEDURE — 80053 COMPREHEN METABOLIC PANEL: CPT | Performed by: INTERNAL MEDICINE

## 2022-01-27 PROCEDURE — 36415 COLL VENOUS BLD VENIPUNCTURE: CPT | Performed by: INTERNAL MEDICINE

## 2022-01-27 PROCEDURE — 99900031 HC PATIENT EDUCATION (STAT)

## 2022-01-27 PROCEDURE — 85025 COMPLETE CBC W/AUTO DIFF WBC: CPT | Performed by: INTERNAL MEDICINE

## 2022-01-27 PROCEDURE — 25000003 PHARM REV CODE 250: Performed by: INTERNAL MEDICINE

## 2022-01-27 PROCEDURE — 63600175 PHARM REV CODE 636 W HCPCS: Performed by: INTERNAL MEDICINE

## 2022-01-27 PROCEDURE — 25000003 PHARM REV CODE 250: Performed by: NURSE PRACTITIONER

## 2022-01-27 PROCEDURE — A4216 STERILE WATER/SALINE, 10 ML: HCPCS | Performed by: INTERNAL MEDICINE

## 2022-01-27 PROCEDURE — 11000001 HC ACUTE MED/SURG PRIVATE ROOM

## 2022-01-27 PROCEDURE — C9113 INJ PANTOPRAZOLE SODIUM, VIA: HCPCS | Performed by: INTERNAL MEDICINE

## 2022-01-27 PROCEDURE — 99900035 HC TECH TIME PER 15 MIN (STAT)

## 2022-01-27 RX ORDER — METOCLOPRAMIDE HYDROCHLORIDE 5 MG/ML
10 INJECTION INTRAMUSCULAR; INTRAVENOUS EVERY 6 HOURS
Status: DISCONTINUED | OUTPATIENT
Start: 2022-01-27 | End: 2022-01-27

## 2022-01-27 RX ORDER — SODIUM, POTASSIUM,MAG SULFATES 17.5-3.13G
SOLUTION, RECONSTITUTED, ORAL ORAL
Status: COMPLETED
Start: 2022-01-27 | End: 2022-01-27

## 2022-01-27 RX ORDER — SODIUM, POTASSIUM,MAG SULFATES 17.5-3.13G
180 SOLUTION, RECONSTITUTED, ORAL ORAL SEE ADMIN INSTRUCTIONS
Status: DISCONTINUED | OUTPATIENT
Start: 2022-01-27 | End: 2022-01-28 | Stop reason: HOSPADM

## 2022-01-27 RX ORDER — METOCLOPRAMIDE HYDROCHLORIDE 5 MG/ML
5 INJECTION INTRAMUSCULAR; INTRAVENOUS EVERY 6 HOURS
Status: DISCONTINUED | OUTPATIENT
Start: 2022-01-27 | End: 2022-01-28

## 2022-01-27 RX ORDER — SODIUM, POTASSIUM,MAG SULFATES 17.5-3.13G
1 SOLUTION, RECONSTITUTED, ORAL ORAL SEE ADMIN INSTRUCTIONS
Status: DISCONTINUED | OUTPATIENT
Start: 2022-01-27 | End: 2022-01-27

## 2022-01-27 RX ADMIN — METOCLOPRAMIDE 5 MG: 5 INJECTION, SOLUTION INTRAMUSCULAR; INTRAVENOUS at 05:01

## 2022-01-27 RX ADMIN — METOPROLOL TARTRATE 50 MG: 50 TABLET, FILM COATED ORAL at 08:01

## 2022-01-27 RX ADMIN — PANTOPRAZOLE SODIUM 40 MG: 40 INJECTION, POWDER, FOR SOLUTION INTRAVENOUS at 08:01

## 2022-01-27 RX ADMIN — FUROSEMIDE 20 MG: 20 TABLET ORAL at 08:01

## 2022-01-27 RX ADMIN — HYDROCODONE BITARTRATE AND ACETAMINOPHEN 1 TABLET: 7.5; 325 TABLET ORAL at 05:01

## 2022-01-27 RX ADMIN — ALPRAZOLAM 0.5 MG: 0.5 TABLET ORAL at 08:01

## 2022-01-27 RX ADMIN — LEVOTHYROXINE SODIUM 150 MCG: 0.15 TABLET ORAL at 05:01

## 2022-01-27 RX ADMIN — GABAPENTIN 300 MG: 300 CAPSULE ORAL at 08:01

## 2022-01-27 RX ADMIN — HYDROCODONE BITARTRATE AND ACETAMINOPHEN 1 TABLET: 7.5; 325 TABLET ORAL at 08:01

## 2022-01-27 RX ADMIN — OXYCODONE HYDROCHLORIDE AND ACETAMINOPHEN 1000 MG: 500 TABLET ORAL at 08:01

## 2022-01-27 RX ADMIN — SODIUM SULFATE, POTASSIUM SULFATE, MAGNESIUM SULFATE 180 ML: 17.5; 3.13; 1.6 SOLUTION, CONCENTRATE ORAL at 08:01

## 2022-01-27 RX ADMIN — HYDROCODONE BITARTRATE AND ACETAMINOPHEN 1 TABLET: 7.5; 325 TABLET ORAL at 11:01

## 2022-01-27 RX ADMIN — Medication 10 ML: at 11:01

## 2022-01-27 RX ADMIN — AMIODARONE HYDROCHLORIDE 100 MG: 100 TABLET ORAL at 08:01

## 2022-01-27 RX ADMIN — SODIUM SULFATE, POTASSIUM SULFATE, MAGNESIUM SULFATE: 17.5; 3.13; 1.6 SOLUTION, CONCENTRATE ORAL at 05:01

## 2022-01-27 RX ADMIN — METOCLOPRAMIDE 5 MG: 5 INJECTION, SOLUTION INTRAMUSCULAR; INTRAVENOUS at 11:01

## 2022-01-27 NOTE — ASSESSMENT & PLAN NOTE
Pt with significant iron def anemia - sp 3 u prbcs - repeat h/h improved - gilberto singh dose of venofer - pt with positive fobt   1/26 h/h improved - cont to monitor h/h  1/27 H&H stable

## 2022-01-27 NOTE — ASSESSMENT & PLAN NOTE
Patient with Persistent (7 days or more) atrial fibrillation which is controlled currently with Beta Blocker. Patient is currently in atrial fibrillation.[id number] Score: 2. Anticoagulation indicated. Anticoagulation done with eliquis - however curretnly on hold due to gi bleed and significant anemia.

## 2022-01-27 NOTE — PROGRESS NOTES
Pharmacist Renal Dose Adjustment Note    Aurelia Ruggiero is a 66 y.o. female being treated with the medication metoclopramide    Patient Data:    Vital Signs (Most Recent):  Temp: 98.1 °F (36.7 °C) (01/27/22 1123)  Pulse: 68 (01/27/22 1123)  Resp: 18 (01/27/22 1123)  BP: 111/70 (01/27/22 1123)  SpO2: 95 % (01/27/22 1123) Vital Signs (72h Range):  Temp:  [97.4 °F (36.3 °C)-98.9 °F (37.2 °C)]   Pulse:  [60-83]   Resp:  [16-48]   BP: ()/(50-90)   SpO2:  [72 %-100 %]      Recent Labs   Lab 01/25/22  0653 01/26/22  0819 01/27/22  0821   CREATININE 1.2 1.2 1.3     Serum creatinine: 1.3 mg/dL 01/27/22 0821  Estimated creatinine clearance: 58.8 mL/min    Medication:metoclopramide dose: 10 mg frequency q6h will be changed to medication:metoclopramide dose:5 mg frequency:q6h    Pharmacist's Name: Kirsty Silver  Pharmacist's Extension: [phone]

## 2022-01-27 NOTE — ASSESSMENT & PLAN NOTE
Positive fobt - surgery seeing pt for poss egd/colon - protonix  1/27 Awaiting cardiac clearance for egd/colon by surgery

## 2022-01-27 NOTE — SUBJECTIVE & OBJECTIVE
Interval History: Pt seen and evaluated    Review of Systems   Constitutional: Positive for activity change. Negative for chills, fatigue and fever.   HENT: Negative for ear pain, postnasal drip and sore throat.    Respiratory: Negative for cough, choking, shortness of breath and wheezing.    Cardiovascular: Negative for chest pain, palpitations and leg swelling.   Gastrointestinal: Negative for abdominal pain, blood in stool, nausea and vomiting.   Genitourinary: Negative for difficulty urinating.   Musculoskeletal: Positive for arthralgias and back pain.   Skin: Negative for rash and wound.   Neurological: Positive for weakness. Negative for syncope.     Objective:     Vital Signs (Most Recent):  Temp: 98.1 °F (36.7 °C) (01/27/22 1123)  Pulse: 68 (01/27/22 1123)  Resp: 18 (01/27/22 1123)  BP: 111/70 (01/27/22 1123)  SpO2: 95 % (01/27/22 1123) Vital Signs (24h Range):  Temp:  [97.4 °F (36.3 °C)-98.5 °F (36.9 °C)] 98.1 °F (36.7 °C)  Pulse:  [68-75] 68  Resp:  [18-20] 18  SpO2:  [92 %-96 %] 95 %  BP: (111-135)/(67-90) 111/70     Weight: (!) 142.9 kg (315 lb)  Body mass index is 54.07 kg/m².    Intake/Output Summary (Last 24 hours) at 1/27/2022 1337  Last data filed at 1/27/2022 0500  Gross per 24 hour   Intake 700 ml   Output 1000 ml   Net -300 ml      Physical Exam  Constitutional:       Appearance: Normal appearance.      Comments: Obese female   HENT:      Nose: Nose normal.      Mouth/Throat:      Mouth: Mucous membranes are moist.   Eyes:      Extraocular Movements: Extraocular movements intact.      Pupils: Pupils are equal, round, and reactive to light.   Cardiovascular:      Rate and Rhythm: Normal rate and regular rhythm.   Pulmonary:      Effort: Pulmonary effort is normal. No respiratory distress.      Breath sounds: Normal breath sounds. No wheezing.   Abdominal:      General: There is no distension.      Palpations: Abdomen is soft.   Musculoskeletal:         General: Normal range of motion.   Skin:      General: Skin is warm.   Neurological:      Mental Status: She is alert.         Significant Labs:   All pertinent labs within the past 24 hours have been reviewed.  CBC:   Recent Labs   Lab 01/26/22 0819 01/27/22 0821   WBC 6.98 6.49   HGB 10.3* 11.2*   HCT 33.5* 36.3*    236     CMP:   Recent Labs   Lab 01/26/22 0819 01/27/22 0821   * 145   K 4.3 3.5   * 110   CO2 34* 30*   GLU 91 149*   BUN 16 15   CREATININE 1.2 1.3   CALCIUM 8.1* 8.1*   PROT  --  6.4   ALBUMIN  --  2.8*   BILITOT  --  0.7   ALKPHOS  --  56   AST  --  16   ALT  --  16   ANIONGAP 3* 5*   EGFRNONAA 46.5* 42.3*       Significant Imaging: I have reviewed all pertinent imaging results/findings within the past 24 hours.

## 2022-01-27 NOTE — PROGRESS NOTES
[facility] Medicine  Progress Note    Patient Name: Aurelia Ruggiero  MRN: [id number]  Patient Class: IP- Inpatient   Admission Date: 1/24/2022  Length of Stay: 3 days  Attending Physician: Marimar Kellogg MD  Primary Care Provider: Marimar Kellogg MD        Subjective:     Principal Problem:GI bleed        HPI:    65 yo female with history of atrial fibrillation on eliquis here via EMS with RBIGGS. Gradual onset x 1 week. No CP. Symptoms resolve at rest. No fever. Denies any hemorrhage. No prior similar.  Pt denies any brbpr or melena, or heartburn.       Overview/Hospital Course:  1/26 ND pt feelign better- less sob this am.  No chest pain - awaiting cardiac clearance to be able to do scopes    1/27 SD: Pt reports continued improvement of symptoms. H&H stable. Awaiting cardiac clearance for egd/colonoscopy with Dr. Terrazas.      Interval History: Pt seen and evaluated    Review of Systems   Constitutional: Positive for activity change. Negative for chills, fatigue and fever.   HENT: Negative for ear pain, postnasal drip and sore throat.    Respiratory: Negative for cough, choking, shortness of breath and wheezing.    Cardiovascular: Negative for chest pain, palpitations and leg swelling.   Gastrointestinal: Negative for abdominal pain, blood in stool, nausea and vomiting.   Genitourinary: Negative for difficulty urinating.   Musculoskeletal: Positive for arthralgias and back pain.   Skin: Negative for rash and wound.   Neurological: Positive for weakness. Negative for syncope.     Objective:     Vital Signs (Most Recent):  Temp: 98.1 °F (36.7 °C) (01/27/22 1123)  Pulse: 68 (01/27/22 1123)  Resp: 18 (01/27/22 1123)  BP: 111/70 (01/27/22 1123)  SpO2: 95 % (01/27/22 1123) Vital Signs (24h Range):  Temp:  [97.4 °F (36.3 °C)-98.5 °F (36.9 °C)] 98.1 °F (36.7 °C)  Pulse:  [68-75] 68  Resp:  [18-20] 18  SpO2:  [92 %-96 %] 95 %  BP: (111-135)/(67-90) 111/70     Weight: (!) 142.9 kg (315 lb)  Body mass  index is 54.07 kg/m².    Intake/Output Summary (Last 24 hours) at 1/27/2022 1337  Last data filed at 1/27/2022 0500  Gross per 24 hour   Intake 700 ml   Output 1000 ml   Net -300 ml      Physical Exam  Constitutional:       Appearance: Normal appearance.      Comments: Obese female   HENT:      Nose: Nose normal.      Mouth/Throat:      Mouth: Mucous membranes are moist.   Eyes:      Extraocular Movements: Extraocular movements intact.      Pupils: Pupils are equal, round, and reactive to light.   Cardiovascular:      Rate and Rhythm: Normal rate and regular rhythm.   Pulmonary:      Effort: Pulmonary effort is normal. No respiratory distress.      Breath sounds: Normal breath sounds. No wheezing.   Abdominal:      General: There is no distension.      Palpations: Abdomen is soft.   Musculoskeletal:         General: Normal range of motion.   Skin:     General: Skin is warm.   Neurological:      Mental Status: She is alert.         Significant Labs:   All pertinent labs within the past 24 hours have been reviewed.  CBC:   Recent Labs   Lab 01/26/22  0819 01/27/22  0821   WBC 6.98 6.49   HGB 10.3* 11.2*   HCT 33.5* 36.3*    236     CMP:   Recent Labs   Lab 01/26/22  0819 01/27/22  0821   * 145   K 4.3 3.5   * 110   CO2 34* 30*   GLU 91 149*   BUN 16 15   CREATININE 1.2 1.3   CALCIUM 8.1* 8.1*   PROT  --  6.4   ALBUMIN  --  2.8*   BILITOT  --  0.7   ALKPHOS  --  56   AST  --  16   ALT  --  16   ANIONGAP 3* 5*   EGFRNONAA 46.5* 42.3*       Significant Imaging: I have reviewed all pertinent imaging results/findings within the past 24 hours.      Assessment/Plan:      * GI bleed  Positive fobt - surgery seeing pt for poss egd/colon - protonix  1/27 Awaiting cardiac clearance for egd/colon by surgery      SOB (shortness of breath)  Sec to significant anemia - improved after blood transfusion  1/26 improved    Anemia  Pt with significant iron def anemia - sp 3 u prbcs - repeat h/h improved - gilberto singh  dose of venofer - pt with positive fobt   1/26 h/h improved - cont to monitor h/h  1/27 H&H stable      Persistent atrial fibrillation  Patient with Persistent (7 days or more) atrial fibrillation which is controlled currently with Beta Blocker. Patient is currently in atrial fibrillation.[id number] Score: 2. Anticoagulation indicated. Anticoagulation done with eliquis - however curretnly on hold due to gi bleed and significant anemia.          VTE Risk Mitigation (From admission, onward)         Ordered     IP VTE HIGH RISK PATIENT  Once         01/24/22 1744     Place sequential compression device  Until discontinued         01/24/22 1744                Discharge Planning   PARISH:      Code Status: Full Code   Is the patient medically ready for discharge?:     Reason for patient still in hospital (select all that apply): Patient trending condition, Laboratory test and Treatment  Discharge Plan A: Home                  Marizol Hernández NP  Department of Hospital Medicine   [facility] Surg

## 2022-01-28 ENCOUNTER — ANESTHESIA EVENT (OUTPATIENT)
Dept: ENDOSCOPY | Facility: HOSPITAL | Age: 67
DRG: 378 | End: 2022-01-28
Payer: MEDICARE

## 2022-01-28 ENCOUNTER — ANESTHESIA (OUTPATIENT)
Dept: ENDOSCOPY | Facility: HOSPITAL | Age: 67
DRG: 378 | End: 2022-01-28
Payer: MEDICARE

## 2022-01-28 VITALS
HEIGHT: 64 IN | OXYGEN SATURATION: 94 % | DIASTOLIC BLOOD PRESSURE: 52 MMHG | RESPIRATION RATE: 20 BRPM | WEIGHT: 293 LBS | TEMPERATURE: 98 F | SYSTOLIC BLOOD PRESSURE: 91 MMHG | HEART RATE: 75 BPM | BODY MASS INDEX: 50.02 KG/M2

## 2022-01-28 PROBLEM — K29.01 ACUTE SUPERFICIAL GASTRITIS WITH HEMORRHAGE: Chronic | Status: ACTIVE | Noted: 2022-01-28

## 2022-01-28 LAB
ALBUMIN SERPL BCP-MCNC: 2.8 G/DL (ref 3.5–5.2)
ALP SERPL-CCNC: 58 U/L (ref 55–135)
ALT SERPL W/O P-5'-P-CCNC: 19 U/L (ref 10–44)
ANION GAP SERPL CALC-SCNC: 8 MMOL/L (ref 8–16)
AST SERPL-CCNC: 31 U/L (ref 10–40)
BASOPHILS # BLD AUTO: 0.09 K/UL (ref 0–0.2)
BASOPHILS NFR BLD: 1.3 % (ref 0–1.9)
BILIRUB SERPL-MCNC: 0.7 MG/DL (ref 0.1–1)
BUN SERPL-MCNC: 13 MG/DL (ref 8–23)
CALCIUM SERPL-MCNC: 8.4 MG/DL (ref 8.7–10.5)
CHLORIDE SERPL-SCNC: 105 MMOL/L (ref 95–110)
CO2 SERPL-SCNC: 31 MMOL/L (ref 23–29)
CREAT SERPL-MCNC: 1.1 MG/DL (ref 0.5–1.4)
DIFFERENTIAL METHOD: ABNORMAL
EOSINOPHIL # BLD AUTO: 0.2 K/UL (ref 0–0.5)
EOSINOPHIL NFR BLD: 3.2 % (ref 0–8)
ERYTHROCYTE [DISTWIDTH] IN BLOOD BY AUTOMATED COUNT: 19.3 % (ref 11.5–14.5)
EST. GFR  (AFRICAN AMERICAN): 59.6 ML/MIN/1.73 M^2
EST. GFR  (NON AFRICAN AMERICAN): 51.7 ML/MIN/1.73 M^2
GLUCOSE SERPL-MCNC: 75 MG/DL (ref 70–110)
HCT VFR BLD AUTO: 36.3 % (ref 37–48.5)
HGB BLD-MCNC: 11.1 G/DL (ref 12–16)
IMM GRANULOCYTES # BLD AUTO: 0.01 K/UL (ref 0–0.04)
IMM GRANULOCYTES NFR BLD AUTO: 0.1 % (ref 0–0.5)
LYMPHOCYTES # BLD AUTO: 2.6 K/UL (ref 1–4.8)
LYMPHOCYTES NFR BLD: 35.6 % (ref 18–48)
MCH RBC QN AUTO: 32.3 PG (ref 27–31)
MCHC RBC AUTO-ENTMCNC: 30.6 G/DL (ref 32–36)
MCV RBC AUTO: 106 FL (ref 82–98)
MONOCYTES # BLD AUTO: 0.8 K/UL (ref 0.3–1)
MONOCYTES NFR BLD: 11.3 % (ref 4–15)
NEUTROPHILS # BLD AUTO: 3.5 K/UL (ref 1.8–7.7)
NEUTROPHILS NFR BLD: 48.5 % (ref 38–73)
NRBC BLD-RTO: 0 /100 WBC
PLATELET # BLD AUTO: 224 K/UL (ref 150–450)
PMV BLD AUTO: 10.7 FL (ref 9.2–12.9)
POTASSIUM SERPL-SCNC: 3.7 MMOL/L (ref 3.5–5.1)
PROT SERPL-MCNC: 6.3 G/DL (ref 6–8.4)
RBC # BLD AUTO: 3.44 M/UL (ref 4–5.4)
SODIUM SERPL-SCNC: 144 MMOL/L (ref 136–145)
WBC # BLD AUTO: 7.16 K/UL (ref 3.9–12.7)

## 2022-01-28 PROCEDURE — 27201423 OPTIME MED/SURG SUP & DEVICES STERILE SUPPLY: Performed by: SURGERY

## 2022-01-28 PROCEDURE — 25000003 PHARM REV CODE 250: Performed by: NURSE PRACTITIONER

## 2022-01-28 PROCEDURE — 25000003 PHARM REV CODE 250: Performed by: NURSE ANESTHETIST, CERTIFIED REGISTERED

## 2022-01-28 PROCEDURE — C9113 INJ PANTOPRAZOLE SODIUM, VIA: HCPCS | Performed by: INTERNAL MEDICINE

## 2022-01-28 PROCEDURE — 99900031 HC PATIENT EDUCATION (STAT)

## 2022-01-28 PROCEDURE — 94761 N-INVAS EAR/PLS OXIMETRY MLT: CPT

## 2022-01-28 PROCEDURE — 25000003 PHARM REV CODE 250: Performed by: INTERNAL MEDICINE

## 2022-01-28 PROCEDURE — 37000009 HC ANESTHESIA EA ADD 15 MINS: Performed by: SURGERY

## 2022-01-28 PROCEDURE — 63600175 PHARM REV CODE 636 W HCPCS: Performed by: INTERNAL MEDICINE

## 2022-01-28 PROCEDURE — 37000008 HC ANESTHESIA 1ST 15 MINUTES: Performed by: SURGERY

## 2022-01-28 PROCEDURE — 43239 EGD BIOPSY SINGLE/MULTIPLE: CPT | Performed by: SURGERY

## 2022-01-28 PROCEDURE — 25000003 PHARM REV CODE 250: Performed by: SURGERY

## 2022-01-28 PROCEDURE — 45378 DIAGNOSTIC COLONOSCOPY: CPT | Performed by: SURGERY

## 2022-01-28 PROCEDURE — 99900035 HC TECH TIME PER 15 MIN (STAT)

## 2022-01-28 PROCEDURE — 80053 COMPREHEN METABOLIC PANEL: CPT | Performed by: INTERNAL MEDICINE

## 2022-01-28 PROCEDURE — 36415 COLL VENOUS BLD VENIPUNCTURE: CPT | Performed by: INTERNAL MEDICINE

## 2022-01-28 PROCEDURE — 87081 CULTURE SCREEN ONLY: CPT | Performed by: SURGERY

## 2022-01-28 PROCEDURE — 85025 COMPLETE CBC W/AUTO DIFF WBC: CPT | Performed by: INTERNAL MEDICINE

## 2022-01-28 RX ORDER — AMIODARONE HYDROCHLORIDE 100 MG/1
100 TABLET ORAL DAILY
Qty: 30 TABLET | Refills: 0 | Status: SHIPPED | OUTPATIENT
Start: 2022-01-29

## 2022-01-28 RX ORDER — ETOMIDATE 2 MG/ML
INJECTION INTRAVENOUS
Status: DISCONTINUED | OUTPATIENT
Start: 2022-01-28 | End: 2022-01-28

## 2022-01-28 RX ORDER — PROPOFOL 10 MG/ML
VIAL (ML) INTRAVENOUS
Status: DISCONTINUED | OUTPATIENT
Start: 2022-01-28 | End: 2022-01-28

## 2022-01-28 RX ORDER — PANTOPRAZOLE SODIUM 40 MG/1
40 TABLET, DELAYED RELEASE ORAL 2 TIMES DAILY
Qty: 60 TABLET | Refills: 0 | Status: SHIPPED | OUTPATIENT
Start: 2022-01-28

## 2022-01-28 RX ORDER — METOCLOPRAMIDE HYDROCHLORIDE 5 MG/ML
10 INJECTION INTRAMUSCULAR; INTRAVENOUS EVERY 6 HOURS
Status: DISCONTINUED | OUTPATIENT
Start: 2022-01-28 | End: 2022-01-28

## 2022-01-28 RX ORDER — LIDOCAINE HYDROCHLORIDE 10 MG/ML
INJECTION, SOLUTION INTRAVENOUS
Status: DISCONTINUED | OUTPATIENT
Start: 2022-01-28 | End: 2022-01-28

## 2022-01-28 RX ADMIN — Medication 50 MG: at 10:01

## 2022-01-28 RX ADMIN — ETOMIDATE 4 MG: 2 INJECTION INTRAVENOUS at 10:01

## 2022-01-28 RX ADMIN — Medication 40 MG: at 10:01

## 2022-01-28 RX ADMIN — AMIODARONE HYDROCHLORIDE 100 MG: 100 TABLET ORAL at 08:01

## 2022-01-28 RX ADMIN — Medication 20 MG: at 10:01

## 2022-01-28 RX ADMIN — LEVOTHYROXINE SODIUM 150 MCG: 0.15 TABLET ORAL at 05:01

## 2022-01-28 RX ADMIN — PANTOPRAZOLE SODIUM 40 MG: 40 INJECTION, POWDER, FOR SOLUTION INTRAVENOUS at 08:01

## 2022-01-28 RX ADMIN — FUROSEMIDE 20 MG: 20 TABLET ORAL at 08:01

## 2022-01-28 RX ADMIN — OXYCODONE HYDROCHLORIDE AND ACETAMINOPHEN 1000 MG: 500 TABLET ORAL at 08:01

## 2022-01-28 RX ADMIN — LIDOCAINE HYDROCHLORIDE 50 MG: 10 INJECTION, SOLUTION INTRAVENOUS at 10:01

## 2022-01-28 RX ADMIN — ALPRAZOLAM 0.5 MG: 0.5 TABLET ORAL at 01:01

## 2022-01-28 RX ADMIN — TOPICAL ANESTHETIC 2 EACH: 200 SPRAY DENTAL; PERIODONTAL at 10:01

## 2022-01-28 RX ADMIN — METOPROLOL TARTRATE 50 MG: 50 TABLET, FILM COATED ORAL at 08:01

## 2022-01-28 RX ADMIN — HYDROCODONE BITARTRATE AND ACETAMINOPHEN 1 TABLET: 7.5; 325 TABLET ORAL at 02:01

## 2022-01-28 RX ADMIN — METOCLOPRAMIDE 5 MG: 5 INJECTION, SOLUTION INTRAMUSCULAR; INTRAVENOUS at 05:01

## 2022-01-28 RX ADMIN — ETOMIDATE 10 MG: 2 INJECTION INTRAVENOUS at 10:01

## 2022-01-28 RX ADMIN — SODIUM CHLORIDE: 0.9 INJECTION, SOLUTION INTRAVENOUS at 10:01

## 2022-01-28 NOTE — DISCHARGE SUMMARY
[facility] Hospital Medicine  Discharge Summary      Patient Name: Aurelia Ruggiero  MRN: [id number]  Patient Class: IP- Inpatient  Admission Date: 1/24/2022  Hospital Length of Stay: 4 days  Discharge Date and Time:  01/28/2022 1:57 PM  Attending Physician: Carlo Piper MD   Discharging Provider: Marizol Hernández NP  Primary Care Provider: Carlo Piper MD      HPI:     67 yo female with history of atrial fibrillation on eliquis here via EMS with BRIGGS. Gradual onset x 1 week. No CP. Symptoms resolve at rest. No fever. Denies any hemorrhage. No prior similar.  Pt denies any brbpr or melena, or heartburn.       Procedure(s) (LRB):  EGD, WITH CLOSED BIOPSY (N/A)  COLONOSCOPY (N/A)      Hospital Course:   1/26 ND pt feelign better- less sob this am.  No chest pain - awaiting cardiac clearance to be able to do scopes    1/27 SD: Pt reports continued improvement of symptoms. H&H stable. Awaiting cardiac clearance for egd/colonoscopy with Dr. Terrazas.    1/28 SD: EGD/Colonoscopy with Dr. Terrazas this morning - indicates erosive gastritis with no active bleeding. Discharge home with Protonix BID, frequent CBC checks, and close outpatient follow-up.       Goals of Care Treatment Preferences:  Code Status: Full Code      Consults:   Consults (From admission, onward)        Status Ordering Provider     IP consult to case management  Once        Provider:  (Not yet assigned)    Acknowledged CARLO PIPER     Inpatient consult to Cardiology  Once        Provider:  MD Tramaine Dos Santos TOMAS     Inpatient consult to General Surgery  Once        Provider:  Jl Terrazas MD    Completed CARLO PIPER          * GI bleed  Positive fobt - surgery seeing pt for poss egd/colon - protonix  1/27 Awaiting cardiac clearance for egd/colon by surgery  1/28 EGD/Colonoscopy indicates erosive gastritis with no active bleeding      Acute superficial gastritis with hemorrhage  EGD/Colonoscopy  on 1/28 indicates erosive gastritis with no active bleeding. Discharge home with Protonix BID, frequent CBCs, and close outpatient follow-up.      SOB (shortness of breath)  Sec to significant anemia - improved after blood transfusion  1/26 improved  1/28 resolved    Anemia  Pt with significant iron def anemia - sp 3 u prbcs - repeat h/h improved - gilberto giove dose of venofer - pt with positive fobt   1/26 h/h improved - cont to monitor h/h  1/27 H&H stable  1/28 H&H stable      Persistent atrial fibrillation  Patient with Persistent (7 days or more) atrial fibrillation which is controlled currently with Beta Blocker. Patient is currently in atrial fibrillation.[id number] Score: 2. Anticoagulation indicated. Anticoagulation done with eliquis - however curretnly on hold due to gi bleed and significant anemia.          Final Active Diagnoses:    Diagnosis Date Noted POA    PRINCIPAL PROBLEM:  GI bleed [K92.2] 01/25/2022 Yes    Acute superficial gastritis with hemorrhage [K29.01] 01/28/2022 Yes     Chronic    Anemia [D64.9] 01/25/2022 Yes    SOB (shortness of breath) [R06.02] 01/25/2022 Yes    Persistent atrial fibrillation [I48.19] 10/27/2020 Yes      Problems Resolved During this Admission:       Discharged Condition: fair    Disposition: Home or Self Care    Follow Up:   Follow-up Information     Marimar Kellogg MD. Schedule an appointment as soon as possible for a visit in 1 week.    Specialty: Internal Medicine  Contact information:  [address] [place] [address] [phone]                       Patient Instructions:      CBC auto differential   Standing Status: Future Standing Exp. Date: 02/28/22     Comprehensive metabolic panel   Standing Status: Future Standing Exp. Date: 02/28/22     Diet Cardiac     Activity as tolerated       Significant Diagnostic Studies: Labs: All labs within the past 24 hours have been reviewed    Pending Diagnostic Studies:     None         Medications:  Reconciled  Home Medications:      Medication List      START taking these medications    amiodarone 100 MG Tab  Commonly known as: PACERONE  Take 1 tablet (100 mg total) by mouth once daily.  Start taking on: January 29, 2022     pantoprazole 40 MG tablet  Commonly known as: PROTONIX  Take 1 tablet (40 mg total) by mouth 2 (two) times daily.        CONTINUE taking these medications    ALPRAZolam 0.5 MG tablet  Commonly known as: XANAX  Take 0.5 mg by mouth 2 (two) times daily as needed.     ascorbic acid (vitamin C) 1000 MG tablet  Commonly known as: VITAMIN C  Take 1,000 mg by mouth once daily.     b complex vitamins tablet  Take 1 tablet by mouth once daily.     ferrous sulfate 325 mg (65 mg iron) Tab tablet  Commonly known as: FEOSOL  Take 325 mg by mouth daily with breakfast.     furosemide 20 MG tablet  Commonly known as: LASIX  Take 20 mg by mouth once daily.     gabapentin 300 MG capsule  Commonly known as: NEURONTIN  Take 300 mg by mouth every evening.     HYDROcodone-acetaminophen 7.5-325 mg per tablet  Commonly known as: NORCO  Take 1 tablet by mouth daily as needed.     levothyroxine 150 MCG tablet  Commonly known as: SYNTHROID  Take 150 mcg by mouth before breakfast.     metoprolol tartrate 50 MG tablet  Commonly known as: LOPRESSOR  Take 50 mg by mouth 2 (two) times daily.     multivitamin per tablet  Commonly known as: THERAGRAN  Take 1 tablet by mouth once daily.     vitamin D 1000 units Tab  Commonly known as: VITAMIN D3  Take 1,000 Units by mouth 2 (two) times a day.        STOP taking these medications    apixaban 5 mg Tab  Commonly known as: ELIQUIS     aspirin 81 MG EC tablet  Commonly known as: ECOTRIN     celecoxib 200 MG capsule  Commonly known as: CeleBREX            Indwelling Lines/Drains at time of discharge:   Lines/Drains/Airways     None                 Time spent on the discharge of patient: >30 minutes         Marizol Hernández NP  Department of Hospital Medicine  [facility]

## 2022-01-28 NOTE — PLAN OF CARE
01/28/22 0819   Medicare Message   Important Message from Medicare regarding Discharge Appeal Rights Given to patient/caregiver;Explained to patient/caregiver;Signed/date by patient/caregiver   Date IMM was signed 01/28/22   Time IMM was signed 0819   Spoke with patient at bedside, she is awake, alert, oriented  Anticipating possible discharge today.  Reviewed medicare IM appeal rights message.  Patient verbalizes understanding and signs IM.  Copy left at bedside for patient.

## 2022-01-28 NOTE — PROGRESS NOTES
[facility]  General Surgery  Progress Note    Subjective:     Interval History:  This is a note from 01/27/2022.  Aurelia is feeling pretty good this morning.  She says that she is hungry.  She denies any bleeding or any abdominal pain or vomiting.    We were planning on doing her endoscopy today however did not have the clearance by yesterday evening therefore we canceled her.  As of right now Cardiology has done an echo which I looked at the report and it shows good ejection fraction and evidence of aortic stenosis which is something that we knew in her case.  Based on all this at think we can proceed with the endoscopy tomorrow.  I have counseled the patient regarding the procedure as well as possible hazards and complications and she understands and agrees.  We will proceed then with EGD and colonoscopy tomorrow.    Post-Op Info:  Procedure(s) (LRB):  EGD, WITH CLOSED BIOPSY (N/A)  COLONOSCOPY (N/A)   Day of Surgery      Medications:  Continuous Infusions:  Scheduled Meds:   amiodarone  100 mg Oral Daily    ascorbic acid (vitamin C)  1,000 mg Oral Daily    furosemide  20 mg Oral Daily    gabapentin  300 mg Oral QHS    levothyroxine  150 mcg Oral Before breakfast    metoclopramide HCl  10 mg Intravenous Q6H    metoprolol tartrate  50 mg Oral BID    pantoprazole  40 mg Intravenous BID     PRN Meds:sodium chloride, ALPRAZolam, benzocaine, HYDROcodone-acetaminophen, melatonin, ondansetron, sodium chloride 0.9%, sodium,potassium,mag sulfates     Objective:     Vital Signs (Most Recent):  Temp: 98.6 °F (37 °C) (01/28/22 0704)  Pulse: 73 (01/28/22 0751)  Resp: 18 (01/28/22 0751)  BP: 136/79 (01/28/22 0855)  SpO2: 98 % (01/28/22 0751) Vital Signs (24h Range):  Temp:  [97.4 °F (36.3 °C)-98.6 °F (37 °C)] 98.6 °F (37 °C)  Pulse:  [68-84] 73  Resp:  [18-20] 18  SpO2:  [92 %-98 %] 98 %  BP: (101-150)/(58-80) 136/79       Intake/Output Summary (Last 24 hours) at 1/28/2022 1106  Last data filed at 1/28/2022  1056  Gross per 24 hour   Intake 2740 ml   Output 5400 ml   Net -2660 ml       Physical Exam  Constitutional:       Appearance: She is obese.   Cardiovascular:      Rate and Rhythm: Rhythm irregular.   Pulmonary:      Effort: Pulmonary effort is normal.      Breath sounds: Normal breath sounds.   Abdominal:      General: Abdomen is flat. There is no distension.      Palpations: Abdomen is soft.      Tenderness: There is no abdominal tenderness.   Musculoskeletal:      Cervical back: Neck supple.   Neurological:      Mental Status: She is alert.         Significant Labs:  All pertinent labs from the last 24 hours have been reviewed.    Significant Diagnostics:  None    Assessment/Plan:     Active Diagnoses:    Diagnosis Date Noted POA    PRINCIPAL PROBLEM:  GI bleed [K92.2] her H&H right now is stable with a hemoglobin around 11 therefore no further bleeding at this time. 01/25/2022 Yes    Anemia [D64.9] 01/25/2022 Yes    SOB (shortness of breath) [R06.02] 01/25/2022 Yes    Persistent atrial fibrillation [I48.19] 10/27/2020 Yes      Problems Resolved During this Admission:     Plan:  Proceed with EGD and colonoscopy in the morning.    Jl Terrazas MD  General Surgery  [facility]

## 2022-01-28 NOTE — TRANSFER OF CARE
Anesthesia Transfer of Care Note    Patient: Aurelia Ruggiero    Procedure(s) Performed: Procedure(s) (LRB):  EGD, WITH CLOSED BIOPSY (N/A)  COLONOSCOPY (N/A)    Patient location: GI    Anesthesia Type: MAC    Transport from OR: Transported from OR on room air with adequate spontaneous ventilation    Post pain: adequate analgesia    Post assessment: no apparent anesthetic complications    Post vital signs: stable    Level of consciousness: awake    Nausea/Vomiting: no nausea/vomiting    Complications: none    Transfer of care protocol was followed      Last vitals:   HR 70  RR 16  SPO2 94  T 36.5  /54

## 2022-01-28 NOTE — ANESTHESIA POSTPROCEDURE EVALUATION
Anesthesia Post Evaluation    Patient: Aurelia Ruggiero    Procedure(s) Performed: Procedure(s) (LRB):  EGD, WITH CLOSED BIOPSY (N/A)  COLONOSCOPY (N/A)    Final Anesthesia Type: MAC      Patient location during evaluation: med/surg floor  Patient participation: Yes- Able to Participate  Level of consciousness: awake  Post-procedure vital signs: reviewed and stable  Pain management: adequate  Airway patency: patent    PONV status at discharge: No PONV  Anesthetic complications: no      Cardiovascular status: blood pressure returned to baseline  Respiratory status: spontaneous ventilation  Hydration status: euvolemic  Follow-up not needed.          Vitals Value Taken Time   BP 91/52 01/28/22 1143   Temp 36.4 °C (97.6 °F) 01/28/22 1143   Pulse 75 01/28/22 1143   Resp 18 01/28/22 1143   SpO2 94 % 01/28/22 1143         No case tracking events are documented in the log.      Pain/Joe Score: Pain Rating Prior to Med Admin: 9 (1/27/2022 11:16 PM)  Pain Rating Post Med Admin: 1 (1/28/2022 12:08 AM)  Joe Score: 10 (1/28/2022 11:10 AM)

## 2022-01-28 NOTE — ASSESSMENT & PLAN NOTE
Patient with Persistent (7 days or more) atrial fibrillation which is controlled currently with Beta Blocker. Patient is currently in atrial fibrillation.[id number] Score: 2. Anticoagulation indicated. Anticoagulation done with eliquis - however curretnly on hold due to gi bleed and significant anemia.

## 2022-01-28 NOTE — OP NOTE
[facility]  EGD Procedure  Operative Note    SUMMARY     Date of Procedure: 1/28/2022     Procedure: Procedure(s) (LRB):  EGD, WITH CLOSED BIOPSY (N/A)  COLONOSCOPY (N/A)    Surgeon(s) and Role:     * Jl Terrazas MD - Primary    Assisting Surgeon: None    Patient location: PACU    Pre-Operative Diagnosis: BRIGGS (dyspnea on exertion) [R06.00]  Symptomatic anemia [D64.9]  GI bleed [K92.2]  Iron deficiency anemia due to chronic blood loss [D50.0]  Persistent atrial fibrillation [I48.19]    Post-Operative Diagnosis: Post-Op Diagnosis Codes:     * BRIGGS (dyspnea on exertion) [R06.00]     * Symptomatic anemia [D64.9]     * GI bleed [K92.2]     * Iron deficiency anemia due to chronic blood loss [D50.0]     * Persistent atrial fibrillation [I48.19]     * Acute erosive gastritis [K29.00]     Indications:  Severe iron deficiency anemia and Hemoccult-positive stools     ASA Score: IV     Procedure:                  The patient was brought in to the endoscopy suite where the risks, benefits, and alternatives of the procedure were described.  The patient was given the opportunity to ask questions and then signed informed consent. Patient was positioned in the left lateral decubitus position, continuous monitoring was initiated, and supplemental oxygen was provided via nasal cannula.  Bite block was placed.  Adequate sedation was achieved with the above mentioned medications and then titrated during the entire procedure.  Under direct visualization the gastroscope was introduced through the oropharynx in to the esophagus.  The scope was then advanced in to the stomach and second portion of the duodenum.  Scope was then withdrawn and the mucosa was carefully examined.  The entire gastric mucosa was examined, including the fundus with retroflexion.  Air was evacuated from the stomach and the scope was withdrawn in to the esophagus.  The entire esophageal mucosa was examined.  At this point the procedure was terminated and  the patient was turned around for colonoscopy.    Findings:                 Esophagus:  Upon insertion of the scope the cords were seen and they appeared unremarkable.  The esophagus showed no abnormalities.  Z-line was marked around a 35 cm and the hiatal hernia encountered after that.                      Stomach:  Upon entering the hiatal hernia we saw no evidence of inflammation or bleeding.  We went through the hiatus into the intra-abdominal stomach and there we found evidence of acute gastritis with erosions.  There was no active bleeding.  A retroflexed view of the cardia showed the inflammation again but no active bleeding and the hiatal hernia as well.  A gastric biopsy for urease was obtained in the antrum using cold biopsy forceps.                    Duodenum:  We traverse the pylorus into the duodenum and there was no evidence of any bleeding in the duodenum.  We advanced all the way to the 4th portion without any problems.  The scope was then retrieved.     Specimens:   Specimen (24h ago, onward)            None            Estimated Blood Loss (EBL): * No values recorded between 1/28/2022 12:00 AM and 1/28/2022 11:21 AM *     Complications: No     Diagnostic Impression:  1. Acute erosive gastritis 2. Hiatal hernia      Attestation: I performed the procedure.        Follow Up:             No future appointments.        Jl Terrazas MD  1/28/2022

## 2022-01-28 NOTE — DISCHARGE INSTRUCTIONS
1. GI studies this morning show erosive gastritis with no active bleeding. Take Protonix two time daily for one month, then once daily after that.    2. Do not take Eliquis and Aspirin. Will monitor blood counts (CBC) frequently. Follow-up labs have been ordered for one week after discharge at the hospital. Dr. Kellogg will discuss resuming your Eliquis and Aspirin at your follow-up visit.    3. If signs and symptoms worsen or new signs and symptoms develop, call your doctor or go to the ED.

## 2022-01-28 NOTE — SUBJECTIVE & OBJECTIVE
Interval History: Pt seen and evaluated    Review of Systems   Constitutional: Positive for activity change. Negative for chills, fatigue and fever.   HENT: Negative for ear pain, postnasal drip and sore throat.    Respiratory: Negative for cough, choking, shortness of breath and wheezing.    Cardiovascular: Negative for chest pain, palpitations and leg swelling.   Gastrointestinal: Negative for abdominal pain, blood in stool, nausea and vomiting.   Genitourinary: Negative for difficulty urinating.   Musculoskeletal: Positive for arthralgias and back pain.   Skin: Negative for rash and wound.   Neurological: Positive for weakness. Negative for syncope.     Objective:     Vital Signs (Most Recent):  Temp: 98.6 °F (37 °C) (01/28/22 0704)  Pulse: 73 (01/28/22 0751)  Resp: 18 (01/28/22 0751)  BP: 136/79 (01/28/22 0855)  SpO2: 98 % (01/28/22 0751) Vital Signs (24h Range):  Temp:  [97.4 °F (36.3 °C)-98.6 °F (37 °C)] 98.6 °F (37 °C)  Pulse:  [68-84] 73  Resp:  [18-20] 18  SpO2:  [92 %-98 %] 98 %  BP: (101-150)/(58-80) 136/79     Weight: (!) 142.9 kg (315 lb)  Body mass index is 54.07 kg/m².    Intake/Output Summary (Last 24 hours) at 1/28/2022 0949  Last data filed at 1/28/2022 0500  Gross per 24 hour   Intake 2240 ml   Output 5400 ml   Net -3160 ml      Physical Exam  Constitutional:       Appearance: Normal appearance.      Comments: Obese female   HENT:      Nose: Nose normal.      Mouth/Throat:      Mouth: Mucous membranes are moist.   Eyes:      Extraocular Movements: Extraocular movements intact.      Pupils: Pupils are equal, round, and reactive to light.   Cardiovascular:      Rate and Rhythm: Normal rate and regular rhythm.   Pulmonary:      Effort: Pulmonary effort is normal. No respiratory distress.      Breath sounds: Normal breath sounds. No wheezing.   Abdominal:      General: There is no distension.      Palpations: Abdomen is soft.   Musculoskeletal:         General: Normal range of motion.   Skin:      General: Skin is warm.   Neurological:      Mental Status: She is alert.         Significant Labs:   All pertinent labs within the past 24 hours have been reviewed.  CBC:   Recent Labs   Lab 01/27/22  0821 01/28/22  0540   WBC 6.49 7.16   HGB 11.2* 11.1*   HCT 36.3* 36.3*    224     CMP:   Recent Labs   Lab 01/27/22  0821 01/28/22  0540    144   K 3.5 3.7    105   CO2 30* 31*   * 75   BUN 15 13   CREATININE 1.3 1.1   CALCIUM 8.1* 8.4*   PROT 6.4 6.3   ALBUMIN 2.8* 2.8*   BILITOT 0.7 0.7   ALKPHOS 56 58   AST 16 31   ALT 16 19   ANIONGAP 5* 8   EGFRNONAA 42.3* 51.7*       Significant Imaging: I have reviewed all pertinent imaging results/findings within the past 24 hours.

## 2022-01-28 NOTE — PLAN OF CARE
[place] [facility]  Discharge Final Note    Primary Care Provider: Marimar Kellogg MD    Expected Discharge Date: 1/28/2022    Final Discharge Note (most recent)     Final Note - 01/28/22 1522        Final Note    Assessment Type Final Discharge Note     Anticipated Discharge Disposition Home or Self Care        Post-Acute Status    Discharge Delays None known at this time                 Important Message from Medicare  Important Message from Medicare regarding Discharge Appeal Rights: Given to patient/caregiver,Explained to patient/caregiver,Signed/date by patient/caregiver     Date IMM was signed: 01/28/22  Time IMM was signed: 0819    Contact Info     Marimar Kellogg MD   Specialty: Internal Medicine   Relationship: PCP - General    [address] [place] [address]   Phone: [phone]       Next Steps: Schedule an appointment as soon as possible for a visit in 1 week(s)      Final note is completed. The patient will be discharge home with self care.  was able to provide the patient with an [facility] brochure and 211 flyer upon discharge. The patient does not require any assistance from social service at this time.

## 2022-01-28 NOTE — ASSESSMENT & PLAN NOTE
Pt with significant iron def anemia - sp 3 u prbcs - repeat h/h improved - gilberto bazanove dose of venofer - pt with positive fobt   1/26 h/h improved - cont to monitor h/h  1/27 H&H stable  1/28 H&H stable

## 2022-01-28 NOTE — ASSESSMENT & PLAN NOTE
EGD/Colonoscopy on 1/28 indicates erosive gastritis with no active bleeding. Discharge home with Protonix BID, frequent CBCs, and close outpatient follow-up.

## 2022-01-28 NOTE — OP NOTE
[facility]  Colonoscopy Procedure  Operative Note    SUMMARY     Date of Procedure: 1/28/2022     Procedure: Procedure(s) (LRB):  EGD, WITH CLOSED BIOPSY (N/A)  COLONOSCOPY (N/A)    Surgeon(s) and Role:     * Jl Terrazas MD - Primary    Assisting Surgeon: None     Patient location: PACU    Pre-Operative Diagnosis: BRIGGS (dyspnea on exertion) [R06.00]  Symptomatic anemia [D64.9]  GI bleed [K92.2]  Iron deficiency anemia due to chronic blood loss [D50.0]  Persistent atrial fibrillation [I48.19]    Post-Operative Diagnosis: Post-Op Diagnosis Codes:     * BRIGGS (dyspnea on exertion) [R06.00]     * Symptomatic anemia [D64.9]     * GI bleed [K92.2]     * Iron deficiency anemia due to chronic blood loss [D50.0]     * Persistent atrial fibrillation [I48.19]     * Acute erosive gastritis [K29.00]     Indications:  Severe anemia and Hemoccult-positive stools     Anesthesia:  Mac        Procedure:                 .  Adequate sedation was achieved with the above mentioned medications and then titrated during the entire procedure.  Digital rectal exam was performed.  Under direct visualization the colonoscope was introduced through the anus in to the rectum.  The scope was then advanced to the terminal ileum.  Scope was then withdrawn and the mucosa was carefully examined in a circular fashion.  The entire colonic mucosa was examined, including the rectum with retroflexion.  Air was evacuated from the colon and the procedure was terminated.  The patient tolerated the procedure well and was able to be transferred to the recovery area in stable condition.    Findings:                 Digital rectal examination:  Rectal exam showed normal sphincter tone with no masses.  There was no blood on the glove.                      Rectum:  Rectal mucosa appeared unremarkable.                    Sigmoid:  Sigmoid colon showed no abnormalities.  No diverticula were seen.        Descending:  Descending colon showed no  abnormalities         Transverse:  Transverse colon was unremarkable         Ascending:  Ascending colon showed no abnormalities        Cecum:  Cecal mucosa appeared unremarkable.  Ileocecal valve and appendiceal orifice were normal.  We entered into the terminal ileum.        Terminal Ileum:  Terminal ileum mucosa was normal.     Specimens:   Specimens (From admission, onward)    None            Estimated Blood Loss (EBL): * No values recorded between 1/28/2022 12:00 AM and 1/28/2022 11:25 AM *     Complications: No     Diagnostic Impression:  1. Normal colonoscopy     Recommendations: Patient is to be returned back to her quinones..    Disposition: PACU - hemodynamically stable.     Attestation: I performed the procedure.        Follow Up:             No future appointments.        Jl Terrazas MD  1/28/2022

## 2022-01-28 NOTE — ANESTHESIA PREPROCEDURE EVALUATION
01/28/2022  Aurelia Ruggiero is a 66 y.o., female.    Anesthesia Evaluation    I have reviewed the Patient Summary Reports.   I have reviewed the NPO Status.   I have reviewed the Medications.     Review of Systems  Anesthesia Hx:  No problems with previous Anesthesia  Denies Family Hx of Anesthesia complications.   Denies Personal Hx of Anesthesia complications.   Social:  Non-Smoker    Cardiovascular:   Valvular problems/Murmurs, AS Dysrhythmias atrial fibrillation CHF PVD ECG has been reviewed. CAROTID DISEASE,MOD TO SEVERE AS    Pulmonary:   Shortness of breath    Renal/:  Renal/ Normal     Hepatic/GI:  Hepatic/GI Normal    Neurological:  Neurology Normal    Endocrine:   Hypothyroidism    Psych:   anxiety        Lab Results   Component Value Date    WBC 7.16 01/28/2022    HGB 11.1 (L) 01/28/2022    HCT 36.3 (L) 01/28/2022     (H) 01/28/2022     01/28/2022     CMP  Sodium   Date Value Ref Range Status   01/28/2022 144 136 - 145 mmol/L Final     Potassium   Date Value Ref Range Status   01/28/2022 3.7 3.5 - 5.1 mmol/L Final     Chloride   Date Value Ref Range Status   01/28/2022 105 95 - 110 mmol/L Final     CO2   Date Value Ref Range Status   01/28/2022 31 (H) 23 - 29 mmol/L Final     Glucose   Date Value Ref Range Status   01/28/2022 75 70 - 110 mg/dL Final     BUN   Date Value Ref Range Status   01/28/2022 13 8 - 23 mg/dL Final     Creatinine   Date Value Ref Range Status   01/28/2022 1.1 0.5 - 1.4 mg/dL Final     Calcium   Date Value Ref Range Status   01/28/2022 8.4 (L) 8.7 - 10.5 mg/dL Final     Total Protein   Date Value Ref Range Status   01/28/2022 6.3 6.0 - 8.4 g/dL Final     Albumin   Date Value Ref Range Status   01/28/2022 2.8 (L) 3.5 - 5.2 g/dL Final     Total Bilirubin   Date Value Ref Range Status   01/28/2022 0.7 0.1 - 1.0 mg/dL Final     Comment:     For infants and  newborns, interpretation of results should be based  on gestational age, weight and in agreement with clinical  observations.    Premature Infant recommended reference ranges:  Up to 24 hours.............<8.0 mg/dL  Up to 48 hours............<12.0 mg/dL  3-5 days..................<15.0 mg/dL  6-29 days.................<15.0 mg/dL    For patients on Eltrombopag therapy, use of Dimension [place] TBIL is   not   recommended.       Alkaline Phosphatase   Date Value Ref Range Status   01/28/2022 58 55 - 135 U/L Final     AST   Date Value Ref Range Status   01/28/2022 31 10 - 40 U/L Final     ALT   Date Value Ref Range Status   01/28/2022 19 10 - 44 U/L Final     Anion Gap   Date Value Ref Range Status   01/28/2022 8 8 - 16 mmol/L Final     eGFR if    Date Value Ref Range Status   01/28/2022 59.6 (A) >60 mL/min/1.73 m^2 Final     eGFR if non    Date Value Ref Range Status   01/28/2022 51.7 (A) >60 mL/min/1.73 m^2 Final     Comment:     Calculation used to obtain the estimated glomerular filtration  rate (eGFR) is the CKD-EPI equation.          Physical Exam  General:  Well nourished, Morbid Obesity    Airway/Jaw/Neck:  Airway Findings: Mouth Opening: Normal Tongue: Normal  General Airway Assessment: Adult  Mallampati: III  Improves to II with phonation.  TM Distance: Normal, at least 6 cm  Jaw/Neck Findings:  Neck ROM: Normal ROM      Dental:  Dental Findings: In tact   Chest/Lungs:  Chest/Lungs Findings: Clear to auscultation, Normal Respiratory Rate     Heart/Vascular:  Heart Findings: Rate: Normal  Rhythm: Regular Rhythm  Sounds: Normal  Heart Murmur  Systolic  Diastolic Heart Murmur Grade III        Mental Status:  Mental Status Findings:  Cooperative         Anesthesia Plan  Type of Anesthesia, risks & benefits discussed:  Anesthesia Type:  MAC    Patient's Preference:   Plan Factors:          Intra-op Monitoring Plan: standard ASA monitors  Intra-op Monitoring Plan Comments:   Post Op  Pain Control Plan: multimodal analgesia  Post Op Pain Control Plan Comments:     Induction:    Beta Blocker:  Patient is on a Beta-Blocker and has received one dose within the past 24 hours (No further documentation required).       Informed Consent: Patient understands risks and agrees with Anesthesia plan.  Questions answered. Anesthesia consent signed with patient.  ASA Score: 4     Day of Surgery Review of History & Physical: I have interviewed and examined the patient. I have reviewed the patient's H&P dated:  There are no significant changes.  H&P update referred to the surgeon.         Ready For Surgery From Anesthesia Perspective.

## 2022-01-28 NOTE — PROGRESS NOTES
Pharmacist Renal Dose Adjustment Note    Aurelia Ruggiero is a 66 y.o. female being treated with the medication metoclopramide    Patient Data:    Vital Signs (Most Recent):  Temp: 98.6 °F (37 °C) (01/28/22 0704)  Pulse: 72 (01/28/22 0704)  Resp: 18 (01/28/22 0704)  BP: 136/79 (01/28/22 0704)  SpO2: 98 % (01/28/22 0704) Vital Signs (72h Range):  Temp:  [97.4 °F (36.3 °C)-98.6 °F (37 °C)]   Pulse:  [61-84]   Resp:  [18-34]   BP: (101-150)/(50-90)   SpO2:  [90 %-98 %]      Recent Labs   Lab 01/26/22  0819 01/27/22  0821 01/28/22  0540   CREATININE 1.2 1.3 1.1     Serum creatinine: 1.1 mg/dL 01/28/22 0540  Estimated creatinine clearance: 69.5 mL/min    Medication:metoclopramide dose: 5 mg frequency q6h will be changed to medication:metoclopramide dose:10 mg frequency:q6h    Pharmacist's Name: Kirsty Silver  Pharmacist's Extension: [phone]

## 2022-01-28 NOTE — ASSESSMENT & PLAN NOTE
Positive fobt - surgery seeing pt for poss egd/colon - protonix  1/27 Awaiting cardiac clearance for egd/colon by surgery  1/28 EGD/Colonoscopy indicates erosive gastritis with no active bleeding

## 2022-01-29 LAB — HELICOBACTER, RAPID UREA: NEGATIVE

## 2022-01-31 ENCOUNTER — PATIENT OUTREACH (OUTPATIENT)
Dept: ADMINISTRATIVE | Facility: CLINIC | Age: 67
End: 2022-01-31
Payer: MEDICARE

## 2022-01-31 NOTE — PROGRESS NOTES
C3 nurse attempted to contact Aurelia Ruggiero  for a TCC post hospital discharge follow up call. No answer. Left voicemail with callback information. The patient does not have a scheduled HOSFU appointment. Non [facility] PCP .

## 2022-02-01 ENCOUNTER — PATIENT MESSAGE (OUTPATIENT)
Dept: ADMINISTRATIVE | Facility: CLINIC | Age: 67
End: 2022-02-01
Payer: MEDICARE

## 2022-05-24 ENCOUNTER — HOSPITAL ENCOUNTER (EMERGENCY)
Facility: HOSPITAL | Age: 67
Discharge: HOME | End: 2022-05-24
Attending: EMERGENCY MEDICINE
Payer: MEDICARE

## 2022-05-24 VITALS
TEMPERATURE: 98 F | RESPIRATION RATE: 18 BRPM | BODY MASS INDEX: 54.07 KG/M2 | SYSTOLIC BLOOD PRESSURE: 128 MMHG | DIASTOLIC BLOOD PRESSURE: 75 MMHG | HEIGHT: 64 IN | HEART RATE: 82 BPM | OXYGEN SATURATION: 93 %

## 2022-05-24 DIAGNOSIS — N39.0 URINARY TRACT INFECTION WITHOUT HEMATURIA, SITE UNSPECIFIED: ICD-10-CM

## 2022-05-24 DIAGNOSIS — M54.50 ACUTE LEFT-SIDED LOW BACK PAIN WITHOUT SCIATICA: Primary | ICD-10-CM

## 2022-05-24 LAB
BACTERIA #/AREA URNS HPF: ABNORMAL /HPF
BILIRUB UR QL STRIP: NEGATIVE
CLARITY UR: ABNORMAL
COLOR UR: YELLOW
GLUCOSE UR QL STRIP: NEGATIVE
HGB UR QL STRIP: ABNORMAL
HYALINE CASTS #/AREA URNS LPF: 11 /LPF
KETONES UR QL STRIP: NEGATIVE
LEUKOCYTE ESTERASE UR QL STRIP: ABNORMAL
MICROSCOPIC COMMENT: ABNORMAL
NITRITE UR QL STRIP: POSITIVE
PH UR STRIP: 6 [PH] (ref 5–8)
PROT UR QL STRIP: NEGATIVE
RBC #/AREA URNS HPF: 7 /HPF (ref 0–4)
SP GR UR STRIP: 1.01 (ref 1–1.03)
SQUAMOUS #/AREA URNS HPF: 13 /HPF
URN SPEC COLLECT METH UR: ABNORMAL
UROBILINOGEN UR STRIP-ACNC: NEGATIVE EU/DL
WBC #/AREA URNS HPF: 37 /HPF (ref 0–5)

## 2022-05-24 PROCEDURE — 87086 URINE CULTURE/COLONY COUNT: CPT | Performed by: EMERGENCY MEDICINE

## 2022-05-24 PROCEDURE — 99284 EMERGENCY DEPT VISIT MOD MDM: CPT | Mod: 25

## 2022-05-24 PROCEDURE — 63600175 PHARM REV CODE 636 W HCPCS: Performed by: EMERGENCY MEDICINE

## 2022-05-24 PROCEDURE — 81000 URINALYSIS NONAUTO W/SCOPE: CPT | Performed by: EMERGENCY MEDICINE

## 2022-05-24 PROCEDURE — 25000003 PHARM REV CODE 250: Performed by: EMERGENCY MEDICINE

## 2022-05-24 PROCEDURE — 87186 SC STD MICRODIL/AGAR DIL: CPT | Performed by: EMERGENCY MEDICINE

## 2022-05-24 PROCEDURE — 87088 URINE BACTERIA CULTURE: CPT | Performed by: EMERGENCY MEDICINE

## 2022-05-24 PROCEDURE — 96372 THER/PROPH/DIAG INJ SC/IM: CPT | Performed by: EMERGENCY MEDICINE

## 2022-05-24 PROCEDURE — 87077 CULTURE AEROBIC IDENTIFY: CPT | Performed by: EMERGENCY MEDICINE

## 2022-05-24 RX ORDER — TRAMADOL HYDROCHLORIDE 50 MG/1
50 TABLET ORAL EVERY 6 HOURS PRN
Qty: 12 TABLET | Refills: 0 | Status: SHIPPED | OUTPATIENT
Start: 2022-05-24 | End: 2022-07-07 | Stop reason: SDUPTHER

## 2022-05-24 RX ORDER — CEFTRIAXONE 1 G/1
1 INJECTION, POWDER, FOR SOLUTION INTRAMUSCULAR; INTRAVENOUS ONCE
Status: COMPLETED | OUTPATIENT
Start: 2022-05-24 | End: 2022-05-24

## 2022-05-24 RX ORDER — KETOROLAC TROMETHAMINE 30 MG/ML
30 INJECTION, SOLUTION INTRAMUSCULAR; INTRAVENOUS
Status: COMPLETED | OUTPATIENT
Start: 2022-05-24 | End: 2022-05-24

## 2022-05-24 RX ORDER — LIDOCAINE HYDROCHLORIDE 10 MG/ML
1 INJECTION INFILTRATION; PERINEURAL ONCE
Status: COMPLETED | OUTPATIENT
Start: 2022-05-24 | End: 2022-05-24

## 2022-05-24 RX ORDER — CYCLOBENZAPRINE HCL 10 MG
10 TABLET ORAL
Status: COMPLETED | OUTPATIENT
Start: 2022-05-24 | End: 2022-05-24

## 2022-05-24 RX ORDER — CEPHALEXIN 500 MG/1
500 CAPSULE ORAL 4 TIMES DAILY
Qty: 28 CAPSULE | Refills: 0 | Status: ON HOLD | OUTPATIENT
Start: 2022-05-24 | End: 2022-06-14 | Stop reason: HOSPADM

## 2022-05-24 RX ADMIN — CYCLOBENZAPRINE HYDROCHLORIDE 10 MG: 10 TABLET, FILM COATED ORAL at 08:05

## 2022-05-24 RX ADMIN — KETOROLAC TROMETHAMINE 30 MG: 30 INJECTION, SOLUTION INTRAMUSCULAR at 08:05

## 2022-05-24 RX ADMIN — CEFTRIAXONE SODIUM 1 G: 1 INJECTION, POWDER, FOR SOLUTION INTRAMUSCULAR; INTRAVENOUS at 09:05

## 2022-05-24 RX ADMIN — LIDOCAINE HYDROCHLORIDE 1 ML: 10 INJECTION, SOLUTION INFILTRATION; PERINEURAL at 10:05

## 2022-05-24 NOTE — ED PROVIDER NOTES
Encounter Date: 5/24/2022       History     Chief Complaint   Patient presents with    Back Pain     Pt c/o pain to left lower back, onset last week     65 yo female here via [facility] with complaint of atraumatic left low back pain x 1 week. Gradual onset. No aggravating or alleviating factors. Associated with urinary frequency, no dysuria. No fever. No nausea.         Review of patient's allergies indicates:   Allergen Reactions    Iodine and iodide containing products Edema    Latex, natural rubber Hives     Past Medical History:   Diagnosis Date    Adult hypothyroidism     Anxiety     Atherosclerosis of both carotid arteries     Atrial fibrillation     Atrial fibrillation     CHF (congestive heart failure)     Moderate aortic valve stenosis      Past Surgical History:   Procedure Laterality Date    APPENDECTOMY      COLONOSCOPY N/A 1/28/2022    Procedure: COLONOSCOPY;  Surgeon: Jl Terrazas MD;  [facility];  Service: General;  Laterality: N/A;    KNEE SURGERY      TONSILLECTOMY      TREATMENT OF CARDIAC ARRHYTHMIA N/A 10/27/2020    Procedure: Cardioversion or Defibrillation;  Surgeon: Gavin Duran MD;  [facility] CATH;  Service: Cardiovascular;  Laterality: N/A;  APPROVED PER PO[facility] 10/6     Family History   Problem Relation Age of Onset    Heart failure Father     Hypertension Sister     Atrial fibrillation Sister     Cancer Brother     Hypertension Brother      Social History     Tobacco Use    Smoking status: Never Smoker    Smokeless tobacco: Never Used   Substance Use Topics    Alcohol use: Never    Drug use: Never     Review of Systems   Constitutional: Positive for fatigue.   HENT: Negative.    Respiratory: Negative.    Cardiovascular: Negative.    Gastrointestinal: Negative.    Genitourinary: Positive for frequency. Negative for dysuria.   Musculoskeletal: Positive for back pain.   All other systems reviewed and are negative.      Physical Exam     Initial Vitals  [05/24/22 0745]   BP Pulse Resp Temp SpO2   130/68 86 18 98.4 °F (36.9 °C) 95 %      MAP       --         Physical Exam    Nursing note and vitals reviewed.  Constitutional: She appears well-developed and well-nourished. She is not diaphoretic. No distress.   HENT:   Head: Normocephalic and atraumatic.   Eyes: Conjunctivae and EOM are normal. Pupils are equal, round, and reactive to light.   Neck: Neck supple.   Normal range of motion.  Cardiovascular: Normal rate, regular rhythm and intact distal pulses.   Pulmonary/Chest: Breath sounds normal. No respiratory distress. She has no wheezes. She has no rales.   Abdominal: Abdomen is soft. Bowel sounds are normal. She exhibits no distension. There is no abdominal tenderness.   No right CVA tenderness.  No left CVA tenderness. There is no rebound.   Musculoskeletal:         General: No tenderness or edema. Normal range of motion.      Cervical back: Normal range of motion and neck supple.     Neurological: She is alert and oriented to person, place, and time. She has normal strength and normal reflexes. No sensory deficit. GCS score is 15. GCS eye subscore is 4. GCS verbal subscore is 5. GCS motor subscore is 6.   Skin: Skin is warm and dry. Capillary refill takes less than 2 seconds. No rash noted.         ED Course   Procedures  Labs Reviewed   URINALYSIS, REFLEX TO URINE CULTURE - Abnormal; Notable for the following components:       Result Value    Appearance, UA Cloudy (*)     Occult Blood UA 1+ (*)     Nitrite, UA Positive (*)     Leukocytes, UA 2+ (*)     All other components within normal limits    Narrative:     Preferred Collection Type->Urine, Clean Catch  Specimen Source->Urine   URINALYSIS MICROSCOPIC - Abnormal; Notable for the following components:    RBC, UA 7 (*)     WBC, UA 37 (*)     Bacteria Many (*)     Hyaline Casts, UA 11.0 (*)     All other components within normal limits    Narrative:     Preferred Collection Type->Urine, Clean Catch  Specimen  Source->Urine   CULTURE, URINE          Imaging Results    None          Medications   ketorolac injection 30 mg (30 mg Intramuscular Given 5/24/22 0848)   cyclobenzaprine tablet 10 mg (10 mg Oral Given 5/24/22 0848)   cefTRIAXone injection 1 g (1 g Intramuscular Given 5/24/22 0945)   LIDOcaine HCL 10 mg/ml (1%) injection 1 mL (1 mL Intramuscular Given 5/24/22 1030)     Medical Decision Making:   Clinical Tests:   Lab Tests: Ordered and Reviewed                      Clinical Impression:   Final diagnoses:  [M54.50] Acute left-sided low back pain without sciatica (Primary)  [N39.0] Urinary tract infection without hematuria, site unspecified          ED Disposition Condition    Discharge Stable        ED Prescriptions     Medication Sig Dispense Start Date End Date Auth. Provider    cephALEXin (KEFLEX) 500 MG capsule Take 1 capsule (500 mg total) by mouth 4 (four) times daily. for 7 days 28 capsule 5/24/2022 5/31/2022 Beni Solis MD    traMADoL (ULTRAM) 50 mg tablet Take 1 tablet (50 mg total) by mouth every 6 (six) hours as needed for Pain. 12 tablet 5/24/2022  Beni Solis MD        Follow-up Information     Follow up With Specialties Details Why Contact Info    Marimar Kellogg MD Internal Medicine Schedule an appointment as soon as possible for a visit   [address] [place] [id number]  [phone]             Beni Solis MD  05/24/22 1007

## 2022-05-24 NOTE — Clinical Note
"Aurelia Segura" Monik was seen and treated in our emergency department on 5/24/2022.  She may return to work on 05/26/2022.       If you have any questions or concerns, please don't hesitate to call.      Erica TOMLINSON    "

## 2022-05-26 LAB — BACTERIA UR CULT: ABNORMAL

## 2022-06-01 ENCOUNTER — HOSPITAL ENCOUNTER (INPATIENT)
Facility: HOSPITAL | Age: 67
LOS: 13 days | Discharge: NURSING, REHAB OR LONG-TERM CARE | DRG: 871 | End: 2022-06-14
Attending: FAMILY MEDICINE | Admitting: INTERNAL MEDICINE
Payer: MEDICARE

## 2022-06-01 DIAGNOSIS — N28.1 RENAL CYST: ICD-10-CM

## 2022-06-01 DIAGNOSIS — G72.9 MYOPATHY: Primary | ICD-10-CM

## 2022-06-01 DIAGNOSIS — I50.9 CHF EXACERBATION: ICD-10-CM

## 2022-06-01 DIAGNOSIS — I50.9 HEART FAILURE, UNSPECIFIED HF CHRONICITY, UNSPECIFIED HEART FAILURE TYPE: ICD-10-CM

## 2022-06-01 DIAGNOSIS — J81.0 ACUTE PULMONARY EDEMA: ICD-10-CM

## 2022-06-01 DIAGNOSIS — R78.81 BACTEREMIA: ICD-10-CM

## 2022-06-01 DIAGNOSIS — M54.9 BACK PAIN: ICD-10-CM

## 2022-06-01 DIAGNOSIS — I48.11 LONGSTANDING PERSISTENT ATRIAL FIBRILLATION: ICD-10-CM

## 2022-06-01 DIAGNOSIS — M51.16 LUMBAR DISC DISEASE WITH RADICULOPATHY: ICD-10-CM

## 2022-06-01 DIAGNOSIS — A41.9 SEPSIS: ICD-10-CM

## 2022-06-01 LAB
ALBUMIN SERPL BCP-MCNC: 2.6 G/DL (ref 3.5–5.2)
ALP SERPL-CCNC: 99 U/L (ref 55–135)
ALT SERPL W/O P-5'-P-CCNC: 23 U/L (ref 10–44)
ANION GAP SERPL CALC-SCNC: 13 MMOL/L (ref 8–16)
AST SERPL-CCNC: 30 U/L (ref 10–40)
BACTERIA #/AREA URNS HPF: NEGATIVE /HPF
BASOPHILS # BLD AUTO: 0.07 K/UL (ref 0–0.2)
BASOPHILS NFR BLD: 0.7 % (ref 0–1.9)
BILIRUB SERPL-MCNC: 1.6 MG/DL (ref 0.1–1)
BILIRUB UR QL STRIP: NEGATIVE
BUN SERPL-MCNC: 17 MG/DL (ref 8–23)
CALCIUM SERPL-MCNC: 8.5 MG/DL (ref 8.7–10.5)
CHLORIDE SERPL-SCNC: 101 MMOL/L (ref 95–110)
CLARITY UR: CLEAR
CO2 SERPL-SCNC: 25 MMOL/L (ref 23–29)
COLOR UR: YELLOW
CREAT SERPL-MCNC: 1 MG/DL (ref 0.5–1.4)
CTP QC/QA: YES
DIFFERENTIAL METHOD: ABNORMAL
EOSINOPHIL # BLD AUTO: 0 K/UL (ref 0–0.5)
EOSINOPHIL NFR BLD: 0.3 % (ref 0–8)
ERYTHROCYTE [DISTWIDTH] IN BLOOD BY AUTOMATED COUNT: 14.3 % (ref 11.5–14.5)
EST. GFR  (AFRICAN AMERICAN): >60 ML/MIN/1.73 M^2
EST. GFR  (NON AFRICAN AMERICAN): 58 ML/MIN/1.73 M^2
GLUCOSE SERPL-MCNC: 113 MG/DL (ref 70–110)
GLUCOSE UR QL STRIP: NEGATIVE
HCT VFR BLD AUTO: 36.8 % (ref 37–48.5)
HGB BLD-MCNC: 12.5 G/DL (ref 12–16)
HGB UR QL STRIP: NEGATIVE
HYALINE CASTS #/AREA URNS LPF: 0.4 /LPF
IMM GRANULOCYTES # BLD AUTO: 0.04 K/UL (ref 0–0.04)
IMM GRANULOCYTES NFR BLD AUTO: 0.4 % (ref 0–0.5)
KETONES UR QL STRIP: NEGATIVE
LACTATE SERPL-SCNC: 1.4 MMOL/L (ref 0.5–2.2)
LEUKOCYTE ESTERASE UR QL STRIP: ABNORMAL
LYMPHOCYTES # BLD AUTO: 1.4 K/UL (ref 1–4.8)
LYMPHOCYTES NFR BLD: 13.1 % (ref 18–48)
MCH RBC QN AUTO: 31.5 PG (ref 27–31)
MCHC RBC AUTO-ENTMCNC: 34 G/DL (ref 32–36)
MCV RBC AUTO: 93 FL (ref 82–98)
MICROSCOPIC COMMENT: ABNORMAL
MONOCYTES # BLD AUTO: 0.9 K/UL (ref 0.3–1)
MONOCYTES NFR BLD: 8.8 % (ref 4–15)
NEUTROPHILS # BLD AUTO: 8 K/UL (ref 1.8–7.7)
NEUTROPHILS NFR BLD: 76.7 % (ref 38–73)
NITRITE UR QL STRIP: NEGATIVE
NRBC BLD-RTO: 0 /100 WBC
NT-PROBNP SERPL-MCNC: 3164 PG/ML (ref 5–900)
PH UR STRIP: 6 [PH] (ref 5–8)
PLATELET # BLD AUTO: 213 K/UL (ref 150–450)
PMV BLD AUTO: 11.3 FL (ref 9.2–12.9)
POTASSIUM SERPL-SCNC: 3.9 MMOL/L (ref 3.5–5.1)
PROCALCITONIN SERPL IA-MCNC: 0.2 NG/ML
PROT SERPL-MCNC: 7.5 G/DL (ref 6–8.4)
PROT UR QL STRIP: NEGATIVE
RBC # BLD AUTO: 3.97 M/UL (ref 4–5.4)
RBC #/AREA URNS HPF: 4 /HPF (ref 0–4)
SARS-COV-2 RDRP RESP QL NAA+PROBE: NEGATIVE
SODIUM SERPL-SCNC: 139 MMOL/L (ref 136–145)
SP GR UR STRIP: 1.01 (ref 1–1.03)
SQUAMOUS #/AREA URNS HPF: 5 /HPF
URN SPEC COLLECT METH UR: ABNORMAL
UROBILINOGEN UR STRIP-ACNC: 1 EU/DL
WBC # BLD AUTO: 10.37 K/UL (ref 3.9–12.7)
WBC #/AREA URNS HPF: 3 /HPF (ref 0–5)

## 2022-06-01 PROCEDURE — 25000003 PHARM REV CODE 250: Performed by: INTERNAL MEDICINE

## 2022-06-01 PROCEDURE — 25500020 PHARM REV CODE 255: Performed by: FAMILY MEDICINE

## 2022-06-01 PROCEDURE — 99291 CRITICAL CARE FIRST HOUR: CPT | Mod: 25

## 2022-06-01 PROCEDURE — 87040 BLOOD CULTURE FOR BACTERIA: CPT | Performed by: FAMILY MEDICINE

## 2022-06-01 PROCEDURE — A4216 STERILE WATER/SALINE, 10 ML: HCPCS | Performed by: FAMILY MEDICINE

## 2022-06-01 PROCEDURE — 63600175 PHARM REV CODE 636 W HCPCS: Performed by: FAMILY MEDICINE

## 2022-06-01 PROCEDURE — 87077 CULTURE AEROBIC IDENTIFY: CPT | Mod: 59 | Performed by: FAMILY MEDICINE

## 2022-06-01 PROCEDURE — 96375 TX/PRO/DX INJ NEW DRUG ADDON: CPT

## 2022-06-01 PROCEDURE — 63600175 PHARM REV CODE 636 W HCPCS: Performed by: INTERNAL MEDICINE

## 2022-06-01 PROCEDURE — 99292 CRITICAL CARE ADDL 30 MIN: CPT | Mod: 25

## 2022-06-01 PROCEDURE — 25000003 PHARM REV CODE 250: Performed by: FAMILY MEDICINE

## 2022-06-01 PROCEDURE — 96376 TX/PRO/DX INJ SAME DRUG ADON: CPT

## 2022-06-01 PROCEDURE — 83605 ASSAY OF LACTIC ACID: CPT | Performed by: FAMILY MEDICINE

## 2022-06-01 PROCEDURE — 84145 PROCALCITONIN (PCT): CPT | Performed by: NURSE PRACTITIONER

## 2022-06-01 PROCEDURE — 11000001 HC ACUTE MED/SURG PRIVATE ROOM

## 2022-06-01 PROCEDURE — 80053 COMPREHEN METABOLIC PANEL: CPT | Performed by: FAMILY MEDICINE

## 2022-06-01 PROCEDURE — 27000221 HC OXYGEN, UP TO 24 HOURS

## 2022-06-01 PROCEDURE — 87186 SC STD MICRODIL/AGAR DIL: CPT | Mod: 59 | Performed by: FAMILY MEDICINE

## 2022-06-01 PROCEDURE — 36415 COLL VENOUS BLD VENIPUNCTURE: CPT | Performed by: FAMILY MEDICINE

## 2022-06-01 PROCEDURE — 99900031 HC PATIENT EDUCATION (STAT)

## 2022-06-01 PROCEDURE — 93005 ELECTROCARDIOGRAM TRACING: CPT

## 2022-06-01 PROCEDURE — 93010 ELECTROCARDIOGRAM REPORT: CPT | Mod: ,,, | Performed by: INTERNAL MEDICINE

## 2022-06-01 PROCEDURE — 81000 URINALYSIS NONAUTO W/SCOPE: CPT | Performed by: FAMILY MEDICINE

## 2022-06-01 PROCEDURE — 93010 EKG 12-LEAD: ICD-10-PCS | Mod: ,,, | Performed by: INTERNAL MEDICINE

## 2022-06-01 PROCEDURE — 96374 THER/PROPH/DIAG INJ IV PUSH: CPT

## 2022-06-01 PROCEDURE — 83880 ASSAY OF NATRIURETIC PEPTIDE: CPT | Performed by: FAMILY MEDICINE

## 2022-06-01 PROCEDURE — 85025 COMPLETE CBC W/AUTO DIFF WBC: CPT | Performed by: FAMILY MEDICINE

## 2022-06-01 PROCEDURE — 94761 N-INVAS EAR/PLS OXIMETRY MLT: CPT

## 2022-06-01 PROCEDURE — 63600175 PHARM REV CODE 636 W HCPCS: Performed by: NURSE PRACTITIONER

## 2022-06-01 PROCEDURE — U0002 COVID-19 LAB TEST NON-CDC: HCPCS | Performed by: FAMILY MEDICINE

## 2022-06-01 PROCEDURE — 99900035 HC TECH TIME PER 15 MIN (STAT)

## 2022-06-01 RX ORDER — LEVOTHYROXINE SODIUM 125 UG/1
125 TABLET ORAL
Status: COMPLETED | OUTPATIENT
Start: 2022-06-01 | End: 2022-06-01

## 2022-06-01 RX ORDER — METOPROLOL TARTRATE 1 MG/ML
5 INJECTION, SOLUTION INTRAVENOUS
Status: COMPLETED | OUTPATIENT
Start: 2022-06-01 | End: 2022-06-01

## 2022-06-01 RX ORDER — ONDANSETRON 2 MG/ML
4 INJECTION INTRAMUSCULAR; INTRAVENOUS EVERY 8 HOURS PRN
Status: DISCONTINUED | OUTPATIENT
Start: 2022-06-01 | End: 2022-06-14 | Stop reason: HOSPADM

## 2022-06-01 RX ORDER — FUROSEMIDE 10 MG/ML
40 INJECTION INTRAMUSCULAR; INTRAVENOUS
Status: COMPLETED | OUTPATIENT
Start: 2022-06-01 | End: 2022-06-01

## 2022-06-01 RX ORDER — TALC
6 POWDER (GRAM) TOPICAL NIGHTLY PRN
Status: DISCONTINUED | OUTPATIENT
Start: 2022-06-01 | End: 2022-06-14 | Stop reason: HOSPADM

## 2022-06-01 RX ORDER — ENOXAPARIN SODIUM 100 MG/ML
30 INJECTION SUBCUTANEOUS EVERY 24 HOURS
Status: DISCONTINUED | OUTPATIENT
Start: 2022-06-01 | End: 2022-06-01

## 2022-06-01 RX ORDER — ALPRAZOLAM 0.5 MG/1
0.5 TABLET ORAL 2 TIMES DAILY PRN
Status: DISCONTINUED | OUTPATIENT
Start: 2022-06-01 | End: 2022-06-14 | Stop reason: HOSPADM

## 2022-06-01 RX ORDER — ENOXAPARIN SODIUM 100 MG/ML
40 INJECTION SUBCUTANEOUS EVERY 24 HOURS
Status: DISCONTINUED | OUTPATIENT
Start: 2022-06-01 | End: 2022-06-02

## 2022-06-01 RX ORDER — HYDROMORPHONE HYDROCHLORIDE 1 MG/ML
1 INJECTION, SOLUTION INTRAMUSCULAR; INTRAVENOUS; SUBCUTANEOUS EVERY 4 HOURS PRN
Status: DISCONTINUED | OUTPATIENT
Start: 2022-06-01 | End: 2022-06-01

## 2022-06-01 RX ORDER — BACLOFEN 10 MG/1
10 TABLET ORAL 3 TIMES DAILY PRN
Status: DISCONTINUED | OUTPATIENT
Start: 2022-06-01 | End: 2022-06-14 | Stop reason: HOSPADM

## 2022-06-01 RX ORDER — DIPHENHYDRAMINE HYDROCHLORIDE 50 MG/ML
25 INJECTION INTRAMUSCULAR; INTRAVENOUS
Status: COMPLETED | OUTPATIENT
Start: 2022-06-01 | End: 2022-06-01

## 2022-06-01 RX ORDER — ACETAMINOPHEN 325 MG/1
650 TABLET ORAL EVERY 8 HOURS PRN
Status: DISCONTINUED | OUTPATIENT
Start: 2022-06-01 | End: 2022-06-14 | Stop reason: HOSPADM

## 2022-06-01 RX ORDER — FUROSEMIDE 10 MG/ML
40 INJECTION INTRAMUSCULAR; INTRAVENOUS
Status: COMPLETED | OUTPATIENT
Start: 2022-06-01 | End: 2022-06-02

## 2022-06-01 RX ORDER — HYDROMORPHONE HYDROCHLORIDE 1 MG/ML
1 INJECTION, SOLUTION INTRAMUSCULAR; INTRAVENOUS; SUBCUTANEOUS
Status: DISCONTINUED | OUTPATIENT
Start: 2022-06-01 | End: 2022-06-08

## 2022-06-01 RX ORDER — CYCLOBENZAPRINE HCL 10 MG
10 TABLET ORAL EVERY 4 HOURS PRN
Status: DISCONTINUED | OUTPATIENT
Start: 2022-06-01 | End: 2022-06-01

## 2022-06-01 RX ORDER — HYDROMORPHONE HYDROCHLORIDE 1 MG/ML
1 INJECTION, SOLUTION INTRAMUSCULAR; INTRAVENOUS; SUBCUTANEOUS
Status: COMPLETED | OUTPATIENT
Start: 2022-06-01 | End: 2022-06-01

## 2022-06-01 RX ORDER — KETOROLAC TROMETHAMINE 30 MG/ML
15 INJECTION, SOLUTION INTRAMUSCULAR; INTRAVENOUS
Status: COMPLETED | OUTPATIENT
Start: 2022-06-01 | End: 2022-06-01

## 2022-06-01 RX ORDER — TRAMADOL HYDROCHLORIDE 50 MG/1
50 TABLET ORAL EVERY 6 HOURS PRN
Status: DISCONTINUED | OUTPATIENT
Start: 2022-06-01 | End: 2022-06-14 | Stop reason: HOSPADM

## 2022-06-01 RX ORDER — SODIUM CHLORIDE 0.9 % (FLUSH) 0.9 %
10 SYRINGE (ML) INJECTION
Status: DISCONTINUED | OUTPATIENT
Start: 2022-06-01 | End: 2022-06-14 | Stop reason: HOSPADM

## 2022-06-01 RX ORDER — METOPROLOL TARTRATE 50 MG/1
50 TABLET ORAL 2 TIMES DAILY
Status: DISCONTINUED | OUTPATIENT
Start: 2022-06-01 | End: 2022-06-14 | Stop reason: HOSPADM

## 2022-06-01 RX ORDER — GABAPENTIN 300 MG/1
300 CAPSULE ORAL NIGHTLY
Status: DISCONTINUED | OUTPATIENT
Start: 2022-06-01 | End: 2022-06-14 | Stop reason: HOSPADM

## 2022-06-01 RX ORDER — PANTOPRAZOLE SODIUM 40 MG/1
40 TABLET, DELAYED RELEASE ORAL 2 TIMES DAILY
Status: DISCONTINUED | OUTPATIENT
Start: 2022-06-01 | End: 2022-06-14 | Stop reason: HOSPADM

## 2022-06-01 RX ORDER — AMIODARONE HYDROCHLORIDE 100 MG/1
100 TABLET ORAL DAILY
Status: DISCONTINUED | OUTPATIENT
Start: 2022-06-01 | End: 2022-06-14 | Stop reason: HOSPADM

## 2022-06-01 RX ORDER — LEVOTHYROXINE SODIUM 150 UG/1
150 TABLET ORAL
Status: DISCONTINUED | OUTPATIENT
Start: 2022-06-02 | End: 2022-06-14 | Stop reason: HOSPADM

## 2022-06-01 RX ADMIN — DIPHENHYDRAMINE HYDROCHLORIDE 25 MG: 50 INJECTION, SOLUTION INTRAMUSCULAR; INTRAVENOUS at 11:06

## 2022-06-01 RX ADMIN — LEVOTHYROXINE SODIUM 125 MCG: 125 TABLET ORAL at 11:06

## 2022-06-01 RX ADMIN — METOPROLOL TARTRATE 50 MG: 50 TABLET, FILM COATED ORAL at 09:06

## 2022-06-01 RX ADMIN — KETOROLAC TROMETHAMINE 15 MG: 30 INJECTION, SOLUTION INTRAMUSCULAR at 09:06

## 2022-06-01 RX ADMIN — IOHEXOL 100 ML: 350 INJECTION, SOLUTION INTRAVENOUS at 11:06

## 2022-06-01 RX ADMIN — HYDROMORPHONE HYDROCHLORIDE 1 MG: 1 INJECTION, SOLUTION INTRAMUSCULAR; INTRAVENOUS; SUBCUTANEOUS at 04:06

## 2022-06-01 RX ADMIN — ENOXAPARIN SODIUM 40 MG: 40 INJECTION SUBCUTANEOUS at 05:06

## 2022-06-01 RX ADMIN — GABAPENTIN 300 MG: 300 CAPSULE ORAL at 09:06

## 2022-06-01 RX ADMIN — BACLOFEN 10 MG: 10 TABLET ORAL at 09:06

## 2022-06-01 RX ADMIN — FUROSEMIDE 40 MG: 10 INJECTION, SOLUTION INTRAMUSCULAR; INTRAVENOUS at 11:06

## 2022-06-01 RX ADMIN — Medication 10 ML: at 08:06

## 2022-06-01 RX ADMIN — ALPRAZOLAM 0.5 MG: 0.5 TABLET ORAL at 09:06

## 2022-06-01 RX ADMIN — HYDROMORPHONE HYDROCHLORIDE 1 MG: 1 INJECTION, SOLUTION INTRAMUSCULAR; INTRAVENOUS; SUBCUTANEOUS at 09:06

## 2022-06-01 RX ADMIN — AMIODARONE HYDROCHLORIDE 100 MG: 100 TABLET ORAL at 05:06

## 2022-06-01 RX ADMIN — PANTOPRAZOLE SODIUM 40 MG: 40 TABLET, DELAYED RELEASE ORAL at 09:06

## 2022-06-01 RX ADMIN — FUROSEMIDE 40 MG: 10 INJECTION, SOLUTION INTRAMUSCULAR; INTRAVENOUS at 09:06

## 2022-06-01 RX ADMIN — FUROSEMIDE 40 MG: 10 INJECTION, SOLUTION INTRAMUSCULAR; INTRAVENOUS at 05:06

## 2022-06-01 RX ADMIN — METOROPROLOL TARTRATE 5 MG: 5 INJECTION, SOLUTION INTRAVENOUS at 11:06

## 2022-06-01 RX ADMIN — HYDROMORPHONE HYDROCHLORIDE 1 MG: 1 INJECTION, SOLUTION INTRAMUSCULAR; INTRAVENOUS; SUBCUTANEOUS at 08:06

## 2022-06-01 NOTE — PROGRESS NOTES
Pharmacist Renal Dose Adjustment Note    Aurelia Ruggiero is a 67 y.o. female being treated with the medication Lovenox.     Patient Data:    Vital Signs (Most Recent):  Temp: 98.7 °F (37.1 °C) (06/01/22 1200)  Pulse: 80 (06/01/22 1415)  Resp: 18 (06/01/22 1415)  BP: (!) 94/50 (06/01/22 1415)  SpO2: 95 % (06/01/22 1415)   Vital Signs (72h Range):  Temp:  [98.7 °F (37.1 °C)-99.2 °F (37.3 °C)]   Pulse:  []   Resp:  [16-22]   BP: ()/(50-66)   SpO2:  [92 %-99 %]      Recent Labs   Lab 06/01/22  1002   CREATININE 1.0     Serum creatinine: 1 mg/dL 06/01/22 1002  Estimated creatinine clearance: 73.2 mL/min    Medication:Lovenox dose: 30mg frequency daily will be changed to medication:Lovenox dose:40mg frequency:daily.     Pharmacist's Name: JOSÉ MIGUEL TURNER  Pharmacist's Extension: [phone]

## 2022-06-01 NOTE — ED PROVIDER NOTES
Encounter Date: 6/1/2022       History     Chief Complaint   Patient presents with    Back Pain     Complains of non-traumatic lower back pain since 0435 this morning. Recently treated for UTI. 200mcg Fentanyl per EMS. 02 sats 89% on RA.     67-year-old female presents to the ED via EMS with reports of worsening lower back pain.  Patient denies any falls or trauma reports that pain starts and left lower back and radiates down to left leg.  Patient reports she has a history of arthritis but denies any surgeries or procedures on back.  EMS reports patient was noted to be O2 sat on room air of 88%.  Patient has a history of AFib but reports not taking medications this morning.  Patient's PCP is Dr. Kellogg         Review of patient's allergies indicates:   Allergen Reactions    Latex, natural rubber Hives     Past Medical History:   Diagnosis Date    Adult hypothyroidism     Anxiety     Atherosclerosis of both carotid arteries     Atrial fibrillation     Atrial fibrillation     CHF (congestive heart failure)     Moderate aortic valve stenosis      Past Surgical History:   Procedure Laterality Date    APPENDECTOMY      COLONOSCOPY N/A 1/28/2022    Procedure: COLONOSCOPY;  Surgeon: Jl Terrazas MD;  Location: [facility] ENDO;  Service: General;  Laterality: N/A;    KNEE SURGERY      TONSILLECTOMY      TREATMENT OF CARDIAC ARRHYTHMIA N/A 10/27/2020    Procedure: Cardioversion or Defibrillation;  Surgeon: Gavin Duran MD;  Location: [facility] CATH;  Service: Cardiovascular;  Laterality: N/A;  APPROVED PER [facility] 10/6     Family History   Problem Relation Age of Onset    Heart failure Father     Hypertension Sister     Atrial fibrillation Sister     Cancer Brother     Hypertension Brother      Social History     Tobacco Use    Smoking status: Never Smoker    Smokeless tobacco: Never Used   Substance Use Topics    Alcohol use: Never    Drug use: Never     Review of Systems   Respiratory: Positive for  shortness of breath.    Musculoskeletal: Positive for back pain.   All other systems reviewed and are negative.      Physical Exam     Initial Vitals [06/01/22 0904]   BP Pulse Resp Temp SpO2   121/66 105 (!) 22 98.7 °F (37.1 °C) (!) 92 %      MAP       --         Physical Exam    Nursing note and vitals reviewed.  Constitutional: Vital signs are normal. She appears well-developed and well-nourished.   Morbid severe obesity   HENT:   Head: Normocephalic and atraumatic.   Right Ear: External ear normal.   Left Ear: External ear normal.   Nose: Nose normal.   Mouth/Throat: Oropharynx is clear and moist.   Eyes: Conjunctivae, EOM and lids are normal. Pupils are equal, round, and reactive to light. Lids are everted and swept, no foreign bodies found.   Neck: Trachea normal and phonation normal. Neck supple.   Normal range of motion.   Full passive range of motion without pain.     Cardiovascular: Normal rate, regular rhythm, normal heart sounds, intact distal pulses and normal pulses.   Pulmonary/Chest: No respiratory distress. She has no wheezes. She has rhonchi. She has no rales. She exhibits no tenderness.   Abdominal: Abdomen is soft. Bowel sounds are normal. She exhibits no distension. There is no abdominal tenderness.   Musculoskeletal:      Cervical back: Normal, full passive range of motion without pain, normal range of motion and neck supple.      Thoracic back: Normal.      Lumbar back: Tenderness present. No swelling, edema, deformity, signs of trauma, lacerations, spasms or bony tenderness. Normal range of motion. Negative right straight leg raise test and negative left straight leg raise test. No scoliosis.        Back:            ED Course   Critical Care    Date/Time: 6/1/2022 2:31 PM  Performed by: Steven Melo Jr., MD  Authorized by: Steven Melo Jr., MD   Direct patient critical care time: 15 minutes  Additional history critical care time: 15 minutes  Ordering / reviewing critical care time: 15  minutes  Documentation critical care time: 15 minutes  Consulting other physicians critical care time: 15 minutes  Total critical care time (exclusive of procedural time) : 75 minutes  Critical care was necessary to treat or prevent imminent or life-threatening deterioration of the following conditions: cardiac failure, circulatory failure, metabolic crisis, hepatic failure, shock, toxidrome, trauma, renal failure, CNS failure or compromise, dehydration, respiratory failure, sepsis and endocrine crisis.  Critical care was time spent personally by me on the following activities: discussions with consultants, evaluation of patient's response to treatment, obtaining history from patient or surrogate, pulse oximetry, ordering and review of laboratory studies, review of old charts, discussions with primary provider, examination of patient, ordering and performing treatments and interventions, ordering and review of radiographic studies and re-evaluation of patient's condition.        Labs Reviewed   CBC W/ AUTO DIFFERENTIAL - Abnormal; Notable for the following components:       Result Value    RBC 3.97 (*)     Hematocrit 36.8 (*)     MCH 31.5 (*)     Gran # (ANC) 8.0 (*)     Gran % 76.7 (*)     Lymph % 13.1 (*)     All other components within normal limits   COMPREHENSIVE METABOLIC PANEL - Abnormal; Notable for the following components:    Glucose 113 (*)     Calcium 8.5 (*)     Albumin 2.6 (*)     Total Bilirubin 1.6 (*)     eGFR if non  58.0 (*)     All other components within normal limits   NT-PRO NATRIURETIC PEPTIDE - Abnormal; Notable for the following components:    NT-proBNP 3164 (*)     All other components within normal limits   CULTURE, BLOOD   CULTURE, BLOOD   LACTIC ACID, PLASMA   URINALYSIS, REFLEX TO URINE CULTURE   SARS-COV-2 RDRP GENE    Narrative:     This test utilizes isothermal nucleic acid amplification   technology to detect the SARS-CoV-2 RdRp nucleic acid segment.   The  analytical sensitivity (limit of detection) is 125 genome   equivalents/mL.   A POSITIVE result implies infection with the SARS-CoV-2 virus;   the patient is presumed to be contagious.     A NEGATIVE result means that SARS-CoV-2 nucleic acids are not   present above the limit of detection. A NEGATIVE result should be   treated as presumptive. It does not rule out the possibility of   COVID-19 and should not be the sole basis for treatment decisions.   If COVID-19 is strongly suspected based on clinical and exposure   history, re-testing using an alternate molecular assay should be   considered.   This test is only for use under the Food and Drug   Administration s Emergency Use Authorization (EUA).   Commercial kits are provided by [employer].   Performance characteristics of the EUA have been independently   verified by [facility] Department of   Pathology and Laboratory Medicine.   _________________________________________________________________   The authorized Fact Sheet for Healthcare Providers and the authorized Fact   Sheet for Patients of the ID NOW COVID-19 are available on the FDA   website:     https://www.fda.gov/media/767602/download  https://www.fda.gov/media/780396/download             EKG Readings: (Independently Interpreted)   Rhythm: Atrial Fibrillation. Heart Rate: 104. ST Segments: Normal ST Segments. T Waves: Normal. Clinical Impression: Atrial Fibrillation     ECG Results          EKG 12-lead (Final result)  Result time 06/01/22 13:04:39    Final result by Interface, Lab In [facility] (06/01/22 13:04:39)                 Narrative:    Test Reason : M54.9,    Vent. Rate : 104 BPM     Atrial Rate : 100 BPM     P-R Int : 000 ms          QRS Dur : 088 ms      QT Int : 344 ms       P-R-T Axes : 000 -39 058 degrees     QTc Int : 452 ms    Atrial fibrillation with rapid ventricular response  Left axis deviation  Nonspecific ST abnormality  Abnormal ECG  When compared with ECG of  25-JAN-2022 15:49,  Vent. rate has increased BY  42 BPM  Confirmed by Kathya Teague MD ([id number]) on 6/1/2022 1:04:24 PM    Referred By: [email or web address]   SELF           Confirmed By:Kathya Teague MD                            Imaging Results          CTA Chest Non-Coronary (Final result)  Result time 06/01/22 12:15:48    Final result by Ann Vu MD (06/01/22 12:15:48)                 Impression:      1. No evidence of pulmonary emboli  2. Multifocal scattered ground-glass lung opacities greatest within the right upper lobe suspicious for atypical pneumonia or edema  3. Chronic elevation of right hemidiaphragm      Electronically signed by: Ann Vu MD  Date:    06/01/2022  Time:    12:15             Narrative:    EXAMINATION:  CTA CHEST NON CORONARY    CLINICAL HISTORY:  Pulmonary embolism (PE) suspected, high prob;    CT/Cardiac Nuclear exams in prior 12 months: 0    TECHNIQUE:  Pulmonary CT angiogram with IV contrast.  Coronal MIP reconstructions prepared.  Iterative reconstruction utilized.    FINDINGS:  No evidence of pulmonary emboli or aortic dissection.    Severe chronic elevation of the right hemidiaphragm.  Scattered ground-glass lung opacities, greatest within the right upper lobe.  No pleural fluid or pneumothorax.  No pathologically enlarged lymph nodes.  Multiple aortic and coronary artery calcifications.  No acute osseous abnormalities detected.  Tortuous left subclavian artery.                               CT Lumbar Spine Without Contrast (Final result)  Result time 06/01/22 10:51:30    Final result by Ann Vu MD (06/01/22 10:51:30)                 Impression:      1. Scoliosis with severe multilevel degenerative disc disease and facet osteoarthritis resulting in multilevel central canal and neural foraminal stenosis  2. 1.5 cm left renal lesion, possible solid nodule or complex cyst. Non emergent follow-up ultrasound recommended for further lesion  characterization.      Electronically signed by: Ann Vu MD  Date:    06/01/2022  Time:    10:51             Narrative:    EXAMINATION:  CT LUMBAR SPINE WITHOUT CONTRAST    CLINICAL HISTORY:  Low back pain, symptoms persist with > 6wks conservative treatment;    CT/Cardiac Nuclear exams in prior 12 months: 0    TECHNIQUE:  CT lumbar spine without IV contrast.  Sagittal and coronal reformats prepared.  Iterative reconstruction utilized.    COMPARISON:  None    FINDINGS:  mild scoliosis.  No acute fracture or suspicious bone lesion detected.    1.5 cm exophytic midportion left renal lesion denser than expected for a simple cyst, possible solid nodule.    T12-L1, severely degenerated disc with loss of height, endplate degenerative changes and mild bilateral facet osteoarthritis    L1-2, severely degenerated disc with disc osteophytic ridging and bilateral facet osteoarthritis    L2-3, osseous vertebral body fusion across the disc space. Posterior osteophyte and bilateral facet osteoarthritis. Bilateral foraminal stenosis.    L3-4, severely degenerated disc with vacuum phenomenon endplate degenerative changes. Bilateral facet osteoarthritis, grade 1 retrolisthesis. Mild central canal and bilateral foraminal stenosis    L4-5, severe bilateral facet osteoarthritis, grade 1 anterolisthesis, disc bulge. Resulting central canal and bilateral foraminal stenosis    L5-S1, severe bilateral facet osteoarthritis and degenerative disc disease with bilateral foraminal stenosis                               X-Ray Chest AP Portable (Final result)  Result time 06/01/22 10:11:30    Final result by Ann Vu MD (06/01/22 10:11:30)                 Impression:      No acute findings    Severe chronic elevation of right hemidiaphragm      Electronically signed by: Ann Vu MD  Date:    06/01/2022  Time:    10:11             Narrative:    EXAMINATION:  XR CHEST AP PORTABLE    CLINICAL  HISTORY:  Sepsis;    COMPARISON:  01/24/2022    FINDINGS:  Severe chronic elevation of the right hemidiaphragm.  No focal areas of consolidation, vascular congestion or appreciable interval change                                 Medications   ALPRAZolam tablet 0.5 mg (has no administration in time range)   gabapentin capsule 300 mg (has no administration in time range)   levothyroxine tablet 150 mcg (has no administration in time range)   metoprolol tartrate (LOPRESSOR) tablet 50 mg (has no administration in time range)   pantoprazole EC tablet 40 mg (has no administration in time range)   traMADoL tablet 50 mg (has no administration in time range)   amiodarone tablet 100 mg (has no administration in time range)   furosemide injection 40 mg (40 mg Intravenous Given 6/1/22 0949)   HYDROmorphone injection 1 mg (1 mg Intravenous Given 6/1/22 0934)   ketorolac injection 15 mg (15 mg Intravenous Given 6/1/22 0949)   furosemide injection 40 mg (40 mg Intravenous Given 6/1/22 1137)   levothyroxine tablet 125 mcg (125 mcg Oral Given 6/1/22 1153)   diphenhydrAMINE injection 25 mg (25 mg Intravenous Given 6/1/22 1130)   metoprolol injection 5 mg (5 mg Intravenous Given 6/1/22 1131)   iohexoL (OMNIPAQUE 350) injection 100 mL (100 mLs Intravenous Given 6/1/22 1142)     Medical Decision Making:   Clinical Tests:   Lab Tests: Ordered and Reviewed  The following lab test(s) were unremarkable: CBC, Lactate, CMP, PTT, PT, Urinalysis and BNP  Radiological Study: Ordered and Reviewed  Medical Tests: Ordered and Reviewed  ED Management:  Patient has 1/4 sirs criteria no sepsis.  Patient noted to be in AFib with bilateral pulmonary edema.  Patient has high risk for PE secondary to morbid obesity, sedentary lifestyle, heart rate greater than 105, O2 sat 88% on room air.  CT a chest obtained which showed bilateral ground-glass opacities consistent of pulmonary edema.  Patient's heart rate improved after IV Lopressor but patient remained  requiring supplemental oxygen to keep sat above 92%.  Will consult PCP for admission for CHF exacerbation.  Other:   I have discussed this case with another health care provider.       <> Summary of the Discussion: Discussed pt's presents, results, and care with Dr. Kellogg who accepted admission to [facility].                       Clinical Impression:   Final diagnoses:  [M54.9] Back pain  [M51.16] Lumbar disc disease with radiculopathy (Primary)  [N28.1] Renal cyst  [I50.9] Heart failure, unspecified HF chronicity, unspecified heart failure type  [J81.0] Acute pulmonary edema  [I48.11] Longstanding persistent atrial fibrillation  [I50.9] CHF exacerbation          ED Disposition Condition    Admit               Steven Melo Jr., MD  06/01/22 9284

## 2022-06-01 NOTE — PLAN OF CARE
06/01/22 1348   Medicare Message   Important Message from Medicare regarding Discharge Appeal Rights Signed/date by patient/caregiver;Other (comments)  (Obtianed by registration staff.)   Date IMM was signed 06/01/22   Time IMM was signed [id number]

## 2022-06-02 PROBLEM — M54.9 BACK PAIN: Status: ACTIVE | Noted: 2022-06-02

## 2022-06-02 PROBLEM — M54.9 BACK PAIN: Status: RESOLVED | Noted: 2022-06-02 | Resolved: 2022-06-02

## 2022-06-02 PROBLEM — M51.16 LUMBAR DISC DISEASE WITH RADICULOPATHY: Status: ACTIVE | Noted: 2022-06-02

## 2022-06-02 PROBLEM — I48.11 LONGSTANDING PERSISTENT ATRIAL FIBRILLATION: Status: ACTIVE | Noted: 2022-06-02

## 2022-06-02 PROBLEM — J81.0 ACUTE PULMONARY EDEMA: Status: ACTIVE | Noted: 2022-06-02

## 2022-06-02 PROBLEM — R10.9 ABDOMINAL PAIN: Status: ACTIVE | Noted: 2022-06-02

## 2022-06-02 PROBLEM — A41.9 SEPSIS: Status: ACTIVE | Noted: 2022-06-02

## 2022-06-02 LAB
ALBUMIN SERPL BCP-MCNC: 2.7 G/DL (ref 3.5–5.2)
ALP SERPL-CCNC: 98 U/L (ref 55–135)
ALT SERPL W/O P-5'-P-CCNC: 19 U/L (ref 10–44)
ANION GAP SERPL CALC-SCNC: 9 MMOL/L (ref 8–16)
AST SERPL-CCNC: 25 U/L (ref 10–40)
BILIRUB SERPL-MCNC: 1.7 MG/DL (ref 0.1–1)
BUN SERPL-MCNC: 18 MG/DL (ref 8–23)
CALCIUM SERPL-MCNC: 8.3 MG/DL (ref 8.7–10.5)
CHLORIDE SERPL-SCNC: 100 MMOL/L (ref 95–110)
CO2 SERPL-SCNC: 29 MMOL/L (ref 23–29)
CREAT SERPL-MCNC: 1.1 MG/DL (ref 0.5–1.4)
ERYTHROCYTE [DISTWIDTH] IN BLOOD BY AUTOMATED COUNT: 14.6 % (ref 11.5–14.5)
EST. GFR  (AFRICAN AMERICAN): 59.2 ML/MIN/1.73 M^2
EST. GFR  (NON AFRICAN AMERICAN): 51.3 ML/MIN/1.73 M^2
GLUCOSE SERPL-MCNC: 110 MG/DL (ref 70–110)
HCT VFR BLD AUTO: 34.6 % (ref 37–48.5)
HGB BLD-MCNC: 11.5 G/DL (ref 12–16)
MCH RBC QN AUTO: 31.3 PG (ref 27–31)
MCHC RBC AUTO-ENTMCNC: 33.2 G/DL (ref 32–36)
MCV RBC AUTO: 94 FL (ref 82–98)
PLATELET # BLD AUTO: 227 K/UL (ref 150–450)
PMV BLD AUTO: 11.1 FL (ref 9.2–12.9)
POTASSIUM SERPL-SCNC: 3.4 MMOL/L (ref 3.5–5.1)
PROT SERPL-MCNC: 7.4 G/DL (ref 6–8.4)
RBC # BLD AUTO: 3.68 M/UL (ref 4–5.4)
SODIUM SERPL-SCNC: 138 MMOL/L (ref 136–145)
WBC # BLD AUTO: 9.93 K/UL (ref 3.9–12.7)

## 2022-06-02 PROCEDURE — 25000003 PHARM REV CODE 250: Performed by: INTERNAL MEDICINE

## 2022-06-02 PROCEDURE — 94761 N-INVAS EAR/PLS OXIMETRY MLT: CPT

## 2022-06-02 PROCEDURE — C1751 CATH, INF, PER/CENT/MIDLINE: HCPCS

## 2022-06-02 PROCEDURE — 27000221 HC OXYGEN, UP TO 24 HOURS

## 2022-06-02 PROCEDURE — 36410 VNPNXR 3YR/> PHY/QHP DX/THER: CPT

## 2022-06-02 PROCEDURE — 25500020 PHARM REV CODE 255: Performed by: INTERNAL MEDICINE

## 2022-06-02 PROCEDURE — 63600175 PHARM REV CODE 636 W HCPCS: Performed by: INTERNAL MEDICINE

## 2022-06-02 PROCEDURE — 99900031 HC PATIENT EDUCATION (STAT)

## 2022-06-02 PROCEDURE — 80053 COMPREHEN METABOLIC PANEL: CPT | Performed by: INTERNAL MEDICINE

## 2022-06-02 PROCEDURE — 11000001 HC ACUTE MED/SURG PRIVATE ROOM

## 2022-06-02 PROCEDURE — 76937 US GUIDE VASCULAR ACCESS: CPT

## 2022-06-02 PROCEDURE — 25000003 PHARM REV CODE 250: Performed by: FAMILY MEDICINE

## 2022-06-02 PROCEDURE — 99900035 HC TECH TIME PER 15 MIN (STAT)

## 2022-06-02 PROCEDURE — 63600175 PHARM REV CODE 636 W HCPCS: Performed by: NURSE PRACTITIONER

## 2022-06-02 PROCEDURE — 36415 COLL VENOUS BLD VENIPUNCTURE: CPT | Performed by: INTERNAL MEDICINE

## 2022-06-02 PROCEDURE — 36406 VNPNXR<3YRS PHY/QHP OTHER VN: CPT

## 2022-06-02 PROCEDURE — 85027 COMPLETE CBC AUTOMATED: CPT | Performed by: INTERNAL MEDICINE

## 2022-06-02 PROCEDURE — A4216 STERILE WATER/SALINE, 10 ML: HCPCS | Performed by: FAMILY MEDICINE

## 2022-06-02 RX ADMIN — PANTOPRAZOLE SODIUM 40 MG: 40 TABLET, DELAYED RELEASE ORAL at 08:06

## 2022-06-02 RX ADMIN — CEFTRIAXONE SODIUM 1 G: 1 INJECTION, POWDER, FOR SOLUTION INTRAMUSCULAR; INTRAVENOUS at 05:06

## 2022-06-02 RX ADMIN — HYDROMORPHONE HYDROCHLORIDE 1 MG: 1 INJECTION, SOLUTION INTRAMUSCULAR; INTRAVENOUS; SUBCUTANEOUS at 07:06

## 2022-06-02 RX ADMIN — HYDROMORPHONE HYDROCHLORIDE 1 MG: 1 INJECTION, SOLUTION INTRAMUSCULAR; INTRAVENOUS; SUBCUTANEOUS at 08:06

## 2022-06-02 RX ADMIN — HYDROMORPHONE HYDROCHLORIDE 1 MG: 1 INJECTION, SOLUTION INTRAMUSCULAR; INTRAVENOUS; SUBCUTANEOUS at 12:06

## 2022-06-02 RX ADMIN — VANCOMYCIN HYDROCHLORIDE 2500 MG: 1.25 INJECTION, POWDER, LYOPHILIZED, FOR SOLUTION INTRAVENOUS at 07:06

## 2022-06-02 RX ADMIN — FUROSEMIDE 40 MG: 10 INJECTION, SOLUTION INTRAMUSCULAR; INTRAVENOUS at 02:06

## 2022-06-02 RX ADMIN — HYDROMORPHONE HYDROCHLORIDE 1 MG: 1 INJECTION, SOLUTION INTRAMUSCULAR; INTRAVENOUS; SUBCUTANEOUS at 01:06

## 2022-06-02 RX ADMIN — ALPRAZOLAM 0.5 MG: 0.5 TABLET ORAL at 08:06

## 2022-06-02 RX ADMIN — LEVOTHYROXINE SODIUM 150 MCG: 150 TABLET ORAL at 05:06

## 2022-06-02 RX ADMIN — Medication 10 ML: at 12:06

## 2022-06-02 RX ADMIN — IOHEXOL 100 ML: 300 INJECTION, SOLUTION INTRAVENOUS at 11:06

## 2022-06-02 RX ADMIN — VANCOMYCIN HYDROCHLORIDE 1750 MG: 1 INJECTION, POWDER, LYOPHILIZED, FOR SOLUTION INTRAVENOUS at 06:06

## 2022-06-02 RX ADMIN — BACLOFEN 10 MG: 10 TABLET ORAL at 08:06

## 2022-06-02 RX ADMIN — ENOXAPARIN SODIUM 40 MG: 40 INJECTION SUBCUTANEOUS at 05:06

## 2022-06-02 RX ADMIN — METOPROLOL TARTRATE 50 MG: 50 TABLET, FILM COATED ORAL at 08:06

## 2022-06-02 RX ADMIN — APIXABAN 5 MG: 5 TABLET, FILM COATED ORAL at 08:06

## 2022-06-02 RX ADMIN — GABAPENTIN 300 MG: 300 CAPSULE ORAL at 08:06

## 2022-06-02 RX ADMIN — AMIODARONE HYDROCHLORIDE 100 MG: 100 TABLET ORAL at 08:06

## 2022-06-02 NOTE — PLAN OF CARE
[facility] Surg  Initial Discharge Assessment       Primary Care Provider: Marimar Kellogg MD    Admission Diagnosis: Back pain [M54.9]  Renal cyst [N28.1]  Acute pulmonary edema [J81.0]  CHF exacerbation [I50.9]  Lumbar disc disease with radiculopathy [M51.16]  Longstanding persistent atrial fibrillation [I48.11]  Heart failure, unspecified HF chronicity, unspecified heart failure type [I50.9]    Admission Date: 6/1/2022  Expected Discharge Date:          Payor: MEDICARE / Plan: MEDICARE PART A & B / Product Type: Government /     Extended Emergency Contact Information  Primary Emergency Contact: JIMBO GALVAN  Mobile Phone: [phone]  Relation: Son  Preferred language: English   needed? No  Secondary Emergency Contact: Erica Paris   [facility]  Home Phone: [phone]  Relation: Relative    Discharge Plan A: Home, Home Health  Discharge Plan B: Rehab      [facility] - [place] [address]  [facility] [address]  Phone: [phone] Fax: [phone]      Initial Assessment (most recent)     Adult Discharge Assessment - 06/02/22 0829        Discharge Assessment    Assessment Type Discharge Planning Assessment     Confirmed/corrected address, phone number and insurance Yes     Confirmed Demographics Correct on Facesheet     Source of Information patient;family     When was your last doctors appointment? --   The patient  stated that she saw her PCP in February.    Reason For Admission Back pain, Renal cyst, Acute pulmonary edema, CHF exacerbation, Lumbar disc disease with radiculopathy, Longstanding persistent atrial fibrillation, Heart failure, unspecified HF chronicity, unspecified heart failure type     Lives With alone;other (see comments)   The patient lives alone, but she has good support from her family/neighborhood.    Do you expect to return to your current living situation? --   TBD    Do you have help at home or someone to help  you manage your care at home? No   The patient is independent, but she has good family/friend support who is able to assist her if needed.    Prior to hospitilization cognitive status: Alert/Oriented     Current cognitive status: Alert/Oriented     Walking or Climbing Stairs Difficulty ambulation difficulty, requires equipment     Mobility Management cane/standard walker if needed     Dressing/Bathing Difficulty bathing difficulty, requires equipment     Dressing/Bathing Management shower chair/grab bars     Home Layout Able to live on 1st floor     Equipment Currently Used at Home walker, standard;shower chair;grab bar;raised toilet;cane, straight     Patient currently being followed by outpatient case management? No     Do you currently have service(s) that help you manage your care at home? No     Do you take prescription medications? Yes     Do you have prescription coverage? Yes     Do you have any problems affording any of your prescribed medications? No     Is the patient taking medications as prescribed? yes     Who is going to help you get home at discharge? family     How do you get to doctors appointments? family or friend will provide     Are you on dialysis? No     Do you take coumadin? No     Discharge Plan A Home;Home Health     Discharge Plan B Rehab     DME Needed Upon Discharge  other (see comments)   TBD    Discharge Plan discussed with: Patient;Adult children             Initial discharge assessment is completed. Spoke to the patient in her room. She was awake, alert, and oriented to the questions being asked. Her son, Rito, was present at her bedside during the assessment. The patient lives alone in a single level home with her dog. The patient has good support from her family/neighbors. She has DME that she utilize for her care. The patient still works, and she drives. She is open to [facility] or home health upon discharge. She is familiar with community resources such as [place] [facility] and [facility]  [employer] [facility] if needed. Case management/SW will remain available until the patient is discharged.

## 2022-06-02 NOTE — H&P
[facility] Medicine  History & Physical    Patient Name: Aurelia Ruggiero  MRN: [id number]  Patient Class: IP- Inpatient  Admission Date: 6/1/2022  Attending Physician: Marimar Kellogg MD   Primary Care Provider: Marimar Kellogg MD         Patient information was obtained from patient and ER records.     Subjective:     Principal Problem:Sepsis    Chief Complaint:   Chief Complaint   Patient presents with    Back Pain     Complains of non-traumatic lower back pain since 0435 this morning. Recently treated for UTI. 200mcg Fentanyl per EMS. 02 sats 89% on RA.        HPI: 67-year-old female presents to the ED via EMS with reports of worsening lower back pain.  Patient denies any falls or trauma reports that pain starts and left lower back and radiates down to left leg.  Patient reports she has a history of arthritis but denies any surgeries or procedures on back.  EMS reports patient was noted to be O2 sat on room air of 88%.  Patient has a history of AFib but reports not taking medications this morning.  Patient's PCP is Dr. Kellogg       Past Medical History:   Diagnosis Date    Adult hypothyroidism     Anxiety     Atherosclerosis of both carotid arteries     Atrial fibrillation     Atrial fibrillation     CHF (congestive heart failure)     Moderate aortic valve stenosis        Past Surgical History:   Procedure Laterality Date    APPENDECTOMY      COLONOSCOPY N/A 1/28/2022    Procedure: COLONOSCOPY;  Surgeon: Jl Terrazas MD;  Location: [facility];  Service: General;  Laterality: N/A;    KNEE SURGERY      TONSILLECTOMY      TREATMENT OF CARDIAC ARRHYTHMIA N/A 10/27/2020    Procedure: Cardioversion or Defibrillation;  Surgeon: Gavin Duran MD;  Location: [facility] CATH;  Service: Cardiovascular;  Laterality: N/A;  APPROVED PER POORNIMA 10/6       Review of patient's allergies indicates:   Allergen Reactions    Fish containing products Itching    Shellfish containing products Itching     Latex, natural rubber Hives       No current facility-administered medications on file prior to encounter.     Current Outpatient Medications on File Prior to Encounter   Medication Sig    ALPRAZolam (XANAX) 0.5 MG tablet Take 1-2 tablets by mouth every 6hrs as needed for anxiety    amiodarone (PACERONE) 100 MG Tab Take 1 tablet (100 mg total) by mouth once daily.    ascorbic acid, vitamin C, (VITAMIN C) 1000 MG tablet Take 1,000 mg by mouth once daily.    b complex vitamins tablet Take 1 tablet by mouth once daily.    cyclobenzaprine (FLEXERIL) 5 MG tablet Take 1 tablet (5 mg total) by mouth 3 (three) times daily as needed for Muscle spasms.    ferrous sulfate (FEOSOL) 325 mg (65 mg iron) Tab tablet Take 325 mg by mouth daily with breakfast.    furosemide (LASIX) 20 MG tablet Take 1 tablet (20 mg total) by mouth once daily.    gabapentin (NEURONTIN) 300 MG capsule Take 300 mg by mouth every evening.    HYDROcodone-acetaminophen (NORCO) 7.5-325 mg per tablet Take 1 tablet by mouth every 6 (six) hours as needed for Pain.    levothyroxine (SYNTHROID) 150 MCG tablet Take 150 mcg by mouth before breakfast.    metoprolol tartrate (LOPRESSOR) 50 MG tablet Take 50 mg by mouth 2 (two) times daily.    multivitamin (THERAGRAN) per tablet Take 1 tablet by mouth once daily.    pantoprazole (PROTONIX) 40 MG tablet Take 1 tablet (40 mg total) by mouth 2 (two) times daily.    traMADoL (ULTRAM) 50 mg tablet Take 1 tablet (50 mg total) by mouth every 6 (six) hours as needed for Pain.    vitamin D (VITAMIN D3) 1000 units Tab Take 1,000 Units by mouth 2 (two) times a day.     Family History       Problem Relation (Age of Onset)    Atrial fibrillation Sister    Cancer Brother    Heart failure Father    Hypertension Sister, Brother          Tobacco Use    Smoking status: Never Smoker    Smokeless tobacco: Never Used   Substance and Sexual Activity    Alcohol use: Never    Drug use: Never    Sexual activity: Not  on file     Review of Systems   Constitutional:  Positive for activity change, chills and fatigue. Negative for fever.   HENT:  Negative for sinus pressure and sinus pain.    Respiratory:  Positive for cough and shortness of breath. Negative for wheezing.    Cardiovascular:  Positive for palpitations and leg swelling. Negative for chest pain.   Gastrointestinal:  Positive for abdominal pain. Negative for blood in stool, constipation, diarrhea, nausea and vomiting.   Musculoskeletal:  Positive for arthralgias, back pain and gait problem.   Skin:  Negative for wound.   Neurological:  Positive for weakness. Negative for seizures and syncope.   Objective:     Vital Signs (Most Recent):  Temp: 98.1 °F (36.7 °C) (06/02/22 1152)  Pulse: 92 (06/02/22 1400)  Resp: 20 (06/02/22 1323)  BP: 108/68 (06/02/22 1152)  SpO2: 96 % (06/02/22 1152) Vital Signs (24h Range):  Temp:  [98 °F (36.7 °C)-98.9 °F (37.2 °C)] 98.1 °F (36.7 °C)  Pulse:  [73-92] 92  Resp:  [18-20] 20  SpO2:  [93 %-96 %] 96 %  BP: (103-116)/(60-77) 108/68     Weight: 136.1 kg (300 lb)  Body mass index is 51.49 kg/m².    Physical Exam  Constitutional:       Appearance: She is obese. She is ill-appearing.   HENT:      Head: Normocephalic.      Nose: Nose normal.      Mouth/Throat:      Mouth: Mucous membranes are moist.   Eyes:      Extraocular Movements: Extraocular movements intact.      Pupils: Pupils are equal, round, and reactive to light.   Cardiovascular:      Rate and Rhythm: Normal rate and regular rhythm.   Pulmonary:      Effort: Pulmonary effort is normal.      Breath sounds: Normal breath sounds.   Abdominal:      General: Bowel sounds are normal. There is no distension.      Palpations: Abdomen is soft.      Tenderness: There is abdominal tenderness.   Musculoskeletal:      Cervical back: Normal range of motion.   Skin:     General: Skin is warm and dry.      Comments: 1+ pedal pulse on left; 2 + pedal pulse on right   Neurological:      Mental Status:  She is alert and oriented to person, place, and time.         CRANIAL NERVES     CN III, IV, VI   Pupils are equal, round, and reactive to light.     Significant Labs: All pertinent labs within the past 24 hours have been reviewed.  Recent Lab Results         06/02/22  0414        Albumin 2.7       Alkaline Phosphatase 98       ALT 19       Anion Gap 9       AST 25       BILIRUBIN TOTAL 1.7  Comment: For infants and newborns, interpretation of results should be based  on gestational age, weight and in agreement with clinical  observations.    Premature Infant recommended reference ranges:  Up to 24 hours.............<8.0 mg/dL  Up to 48 hours............<12.0 mg/dL  3-5 days..................<15.0 mg/dL  6-29 days.................<15.0 mg/dL    For patients on Eltrombopag therapy, use of Dimension [place] TBIL is   not   recommended.         BUN 18       Calcium 8.3       Chloride 100       CO2 29       Creatinine 1.1       eGFR if African American 59.2       eGFR if non  51.3  Comment: Calculation used to obtain the estimated glomerular filtration  rate (eGFR) is the CKD-EPI equation.          Glucose 110       Hematocrit 34.6       Hemoglobin 11.5       MCH 31.3       MCHC 33.2       MCV 94       MPV 11.1       Platelets 227       Potassium 3.4       PROTEIN TOTAL 7.4       RBC 3.68       RDW 14.6       Sodium 138       WBC 9.93               Significant Imaging: I have reviewed all pertinent imaging results/findings within the past 24 hours.    Assessment/Plan:     * Sepsis  This patient does have evidence of infective focus  My overall impression is sepsis. Vital signs were reviewed and noted in progress note.  Antibiotics given-   Antibiotics (From admission, onward)            Start     Stop Route Frequency Ordered    06/02/22 1800  vancomycin (VANCOCIN) 1,750 mg in dextrose 5 % 500 mL IVPB         -- IV Every 12 hours (non-standard times) 06/02/22 0447    06/02/22 0597  vancomycin - pharmacy to  "dose  (vancomycin IVPB)        "And" Linked Group Details    -- IV pharmacy to manage frequency 06/02/22 0437    06/02/22 0500  cefTRIAXone (ROCEPHIN) 1 g/50 mL D5W IVPB         06/09 0459 IV Every 12 hours (non-standard times) 06/02/22 0437        Cultures were taken-   Microbiology Results (last 7 days)     Procedure Component Value Units Date/Time    Blood culture x two cultures. Draw prior to antibiotics. [[id number]] Collected: 06/01/22 1002    Order Status: Completed Specimen: Blood Updated: 06/02/22 0515     Blood Culture, Routine Gram stain nita bottle: Gram positive cocci in chains resembling Strep       Results called to and read back by: Karen Davis RN 06/02/2022        03:36      Gram stain aer bottle: Gram positive cocci in chains resembling Strep       Positive results previously called 06/02/2022  05:15    Narrative:      Aerobic and anaerobic    Blood culture x two cultures. Draw prior to antibiotics. [[id number]] Collected: 06/01/22 1019    Order Status: Completed Specimen: Blood Updated: 06/02/22 0515     Blood Culture, Routine Gram stain aer bottle: Gram positive cocci in chains resembling Strep       Gram stain nita bottle: Gram positive cocci in chains resembling Strep       Positive results previously called 06/02/2022  05:14    Narrative:      Aerobic and anaerobic        Latest lactate reviewed, they are-  Recent Labs   Lab 06/01/22  1019   LACTATE 1.4       Organ dysfunction indicated by Acute respiratory failure  Source- uti, bacteremia    Source control Achieved by- iv abxs      Abdominal pain  Ct of abd/pelvis to further evaluate pain      Longstanding persistent atrial fibrillation  Patient with Persistent (7 days or more) atrial fibrillation which is controlled currently with Beta Blocker and Amiodarone. Patient is currently in atrial fibrillation.[id number] Score: 2. Anticoagulation indicated. Anticoagulation done with eliquis.  Cards helpign due to recent rvr afib and recent pulm " edema      Lumbar disc disease with radiculopathy  Pt with severe back pain - ct of back shows no fractures but severe degenerative dz      Acute pulmonary edema  Iv diuretics        VTE Risk Mitigation (From admission, onward)         Ordered     apixaban tablet 5 mg  2 times daily         06/02/22 1742     IP VTE HIGH RISK PATIENT  Once         06/01/22 1440     Place sequential compression device  Until discontinued         06/01/22 1440                   Marimar Kellogg MD  Department of Hospital Medicine   [facility] Surg

## 2022-06-02 NOTE — SUBJECTIVE & OBJECTIVE
Past Medical History:   Diagnosis Date    Adult hypothyroidism     Anxiety     Atherosclerosis of both carotid arteries     Atrial fibrillation     Atrial fibrillation     CHF (congestive heart failure)     Moderate aortic valve stenosis        Past Surgical History:   Procedure Laterality Date    APPENDECTOMY      COLONOSCOPY N/A 1/28/2022    Procedure: COLONOSCOPY;  Surgeon: Jl Terrazas MD;  Location: [facility] ENDO;  Service: General;  Laterality: N/A;    KNEE SURGERY      TONSILLECTOMY      TREATMENT OF CARDIAC ARRHYTHMIA N/A 10/27/2020    Procedure: Cardioversion or Defibrillation;  Surgeon: Gavin Duran MD;  Location: [facility] CATH;  Service: Cardiovascular;  Laterality: N/A;  APPROVED PER POORNIMA 10/6       Review of patient's allergies indicates:   Allergen Reactions    Fish containing products Itching    Shellfish containing products Itching    Latex, natural rubber Hives       No current facility-administered medications on file prior to encounter.     Current Outpatient Medications on File Prior to Encounter   Medication Sig    ALPRAZolam (XANAX) 0.5 MG tablet Take 1-2 tablets by mouth every 6hrs as needed for anxiety    amiodarone (PACERONE) 100 MG Tab Take 1 tablet (100 mg total) by mouth once daily.    ascorbic acid, vitamin C, (VITAMIN C) 1000 MG tablet Take 1,000 mg by mouth once daily.    b complex vitamins tablet Take 1 tablet by mouth once daily.    cyclobenzaprine (FLEXERIL) 5 MG tablet Take 1 tablet (5 mg total) by mouth 3 (three) times daily as needed for Muscle spasms.    ferrous sulfate (FEOSOL) 325 mg (65 mg iron) Tab tablet Take 325 mg by mouth daily with breakfast.    furosemide (LASIX) 20 MG tablet Take 1 tablet (20 mg total) by mouth once daily.    gabapentin (NEURONTIN) 300 MG capsule Take 300 mg by mouth every evening.    HYDROcodone-acetaminophen (NORCO) 7.5-325 mg per tablet Take 1 tablet by mouth every 6 (six) hours as needed for Pain.    levothyroxine (SYNTHROID) 150 MCG tablet Take  150 mcg by mouth before breakfast.    metoprolol tartrate (LOPRESSOR) 50 MG tablet Take 50 mg by mouth 2 (two) times daily.    multivitamin (THERAGRAN) per tablet Take 1 tablet by mouth once daily.    pantoprazole (PROTONIX) 40 MG tablet Take 1 tablet (40 mg total) by mouth 2 (two) times daily.    traMADoL (ULTRAM) 50 mg tablet Take 1 tablet (50 mg total) by mouth every 6 (six) hours as needed for Pain.    vitamin D (VITAMIN D3) 1000 units Tab Take 1,000 Units by mouth 2 (two) times a day.     Family History       Problem Relation (Age of Onset)    Atrial fibrillation Sister    Cancer Brother    Heart failure Father    Hypertension Sister, Brother          Tobacco Use    Smoking status: Never Smoker    Smokeless tobacco: Never Used   Substance and Sexual Activity    Alcohol use: Never    Drug use: Never    Sexual activity: Not on file     Review of Systems   Constitutional:  Positive for activity change, chills and fatigue. Negative for fever.   HENT:  Negative for sinus pressure and sinus pain.    Respiratory:  Positive for cough and shortness of breath. Negative for wheezing.    Cardiovascular:  Positive for palpitations and leg swelling. Negative for chest pain.   Gastrointestinal:  Positive for abdominal pain. Negative for blood in stool, constipation, diarrhea, nausea and vomiting.   Musculoskeletal:  Positive for arthralgias, back pain and gait problem.   Skin:  Negative for wound.   Neurological:  Positive for weakness. Negative for seizures and syncope.   Objective:     Vital Signs (Most Recent):  Temp: 98.1 °F (36.7 °C) (06/02/22 1152)  Pulse: 92 (06/02/22 1400)  Resp: 20 (06/02/22 1323)  BP: 108/68 (06/02/22 1152)  SpO2: 96 % (06/02/22 1152) Vital Signs (24h Range):  Temp:  [98 °F (36.7 °C)-98.9 °F (37.2 °C)] 98.1 °F (36.7 °C)  Pulse:  [73-92] 92  Resp:  [18-20] 20  SpO2:  [93 %-96 %] 96 %  BP: (103-116)/(60-77) 108/68     Weight: 136.1 kg (300 lb)  Body mass index is 51.49 kg/m².    Physical  Exam  Constitutional:       Appearance: She is obese. She is ill-appearing.   HENT:      Head: Normocephalic.      Nose: Nose normal.      Mouth/Throat:      Mouth: Mucous membranes are moist.   Eyes:      Extraocular Movements: Extraocular movements intact.      Pupils: Pupils are equal, round, and reactive to light.   Cardiovascular:      Rate and Rhythm: Normal rate and regular rhythm.   Pulmonary:      Effort: Pulmonary effort is normal.      Breath sounds: Normal breath sounds.   Abdominal:      General: Bowel sounds are normal. There is no distension.      Palpations: Abdomen is soft.      Tenderness: There is abdominal tenderness.   Musculoskeletal:      Cervical back: Normal range of motion.   Skin:     General: Skin is warm and dry.      Comments: 1+ pedal pulse on left; 2 + pedal pulse on right   Neurological:      Mental Status: She is alert and oriented to person, place, and time.         CRANIAL NERVES     CN III, IV, VI   Pupils are equal, round, and reactive to light.     Significant Labs: All pertinent labs within the past 24 hours have been reviewed.  Recent Lab Results         06/02/22  0414        Albumin 2.7       Alkaline Phosphatase 98       ALT 19       Anion Gap 9       AST 25       BILIRUBIN TOTAL 1.7  Comment: For infants and newborns, interpretation of results should be based  on gestational age, weight and in agreement with clinical  observations.    Premature Infant recommended reference ranges:  Up to 24 hours.............<8.0 mg/dL  Up to 48 hours............<12.0 mg/dL  3-5 days..................<15.0 mg/dL  6-29 days.................<15.0 mg/dL    For patients on Eltrombopag therapy, use of Dimension [place] TBIL is   not   recommended.         BUN 18       Calcium 8.3       Chloride 100       CO2 29       Creatinine 1.1       eGFR if African American 59.2       eGFR if non  51.3  Comment: Calculation used to obtain the estimated glomerular filtration  rate (eGFR) is  the CKD-EPI equation.          Glucose 110       Hematocrit 34.6       Hemoglobin 11.5       MCH 31.3       MCHC 33.2       MCV 94       MPV 11.1       Platelets 227       Potassium 3.4       PROTEIN TOTAL 7.4       RBC 3.68       RDW 14.6       Sodium 138       WBC 9.93               Significant Imaging: I have reviewed all pertinent imaging results/findings within the past 24 hours.

## 2022-06-02 NOTE — ASSESSMENT & PLAN NOTE
"This patient does have evidence of infective focus  My overall impression is sepsis. Vital signs were reviewed and noted in progress note.  Antibiotics given-   Antibiotics (From admission, onward)            Start     Stop Route Frequency Ordered    06/02/22 1800  vancomycin (VANCOCIN) 1,750 mg in dextrose 5 % 500 mL IVPB         -- IV Every 12 hours (non-standard times) 06/02/22 0447    06/02/22 0537  vancomycin - pharmacy to dose  (vancomycin IVPB)        "And" Linked Group Details    -- IV pharmacy to manage frequency 06/02/22 0437    06/02/22 0500  cefTRIAXone (ROCEPHIN) 1 g/50 mL D5W IVPB         06/09 0459 IV Every 12 hours (non-standard times) 06/02/22 0437        Cultures were taken-   Microbiology Results (last 7 days)     Procedure Component Value Units Date/Time    Blood culture x two cultures. Draw prior to antibiotics. [[id number]] Collected: 06/01/22 1002    Order Status: Completed Specimen: Blood Updated: 06/02/22 0515     Blood Culture, Routine Gram stain nita bottle: Gram positive cocci in chains resembling Strep       Results called to and read back by: Karen Davis RN 06/02/2022        03:36      Gram stain aer bottle: Gram positive cocci in chains resembling Strep       Positive results previously called 06/02/2022  05:15    Narrative:      Aerobic and anaerobic    Blood culture x two cultures. Draw prior to antibiotics. [[id number]] Collected: 06/01/22 1019    Order Status: Completed Specimen: Blood Updated: 06/02/22 0515     Blood Culture, Routine Gram stain aer bottle: Gram positive cocci in chains resembling Strep       Gram stain nita bottle: Gram positive cocci in chains resembling Strep       Positive results previously called 06/02/2022  05:14    Narrative:      Aerobic and anaerobic        Latest lactate reviewed, they are-  Recent Labs   Lab 06/01/22  1019   LACTATE 1.4       Organ dysfunction indicated by Acute respiratory failure  Source- uti, bacteremia    Source control Achieved " by- iv abxs

## 2022-06-02 NOTE — PROGRESS NOTES
Pharmacokinetic Initial Assessment: IV Vancomycin    Assessment/Plan:    Initiate intravenous vancomycin with loading dose of 2500 mg once followed by a maintenance dose of vancomycin 1750 mg IV every 12 hours.  - Ms. Ruggiero's renal function appears stable and at baseline.  - Desired empiric serum trough concentration is 15 to 20 mcg/mL.  - Draw vancomycin trough level 60 min prior to fourth dose on 6/3 at approximately 1700.   - Please draw random level sooner than scheduled trough if renal function changes significantly.    Pharmacy will continue to follow and monitor vancomycin.      Please contact pharmacy with any questions regarding this assessment.     Thank you for the consult,   Bebe Rosas       Patient brief summary:  Aurelia Ruggiero is a 67 y.o. female initiated on antimicrobial therapy with IV Vancomycin for treatment of suspected bacteremia    Actual Body Weight:   136.1 kg    Renal Function:   Estimated Creatinine Clearance: 73.2 mL/min (based on SCr of 1 mg/dL).    Dialysis Method (if applicable):  N/A

## 2022-06-02 NOTE — HPI
67-year-old female presents to the ED via EMS with reports of worsening lower back pain.  Patient denies any falls or trauma reports that pain starts and left lower back and radiates down to left leg.  Patient reports she has a history of arthritis but denies any surgeries or procedures on back.  EMS reports patient was noted to be O2 sat on room air of 88%.  Patient has a history of AFib but reports not taking medications this morning.  Patient's PCP is Dr. Kellogg

## 2022-06-02 NOTE — ASSESSMENT & PLAN NOTE
Patient with Persistent (7 days or more) atrial fibrillation which is controlled currently with Beta Blocker and Amiodarone. Patient is currently in atrial fibrillation.[id number] Score: 2. Anticoagulation indicated. Anticoagulation done with eliquis.  Cards helpign due to recent rvr afib and recent pulm edema

## 2022-06-02 NOTE — PROGRESS NOTES
[facility]  Cardiology  Progress Note    Patient Name: Aurelia Ruggiero  MRN: [id number]  Admission Date: 6/1/2022  Hospital Length of Stay: 1 days  Code Status: Full Code   Attending Physician: Marimar Kellogg MD   Primary Care Physician: Marimar Kellogg MD  Expected Discharge Date:   Principal Problem:<principal problem not specified>    Subjective:     Brief HPI:     66 y/o F with SigHx of morbid obesity, AF on eliquis, severe AS, and BL carotid disease admitted to medicine for acute heart failure.   Pt has no known hx of CHF. Last echo in January reveals EF 68% with moderate to severe AS and PAP 37mmHg.   Pt came to ED for complaints of low back pain. On arrival Pt sats were 89% on RA. She was also noted to be AF RVR, however, pt states she did not take her medications prior to arrival.  CT was done to rule out PE and revealed no evidence of PE Multifocal scattered ground-glass lung opacities greatest within the right upper lobe suspicious for atypical pneumonia or edema.   On my exam, She denies edema. Denies Cp, denies SOB. She is in AF with CVR. Anticoagulated on eliquis.       Interval History:   NO overnight events. Remains AF CVR. Procalcitonin neg. Hypokalemia this am.   -3.2L diuresed since admit     Review of Systems:  Review of Systems   Musculoskeletal: Positive for back pain.   All other systems reviewed and are negative.      Social History:  Social History     Tobacco Use    Smoking status: Never Smoker    Smokeless tobacco: Never Used   Substance Use Topics    Alcohol use: Never       Objective:     Vital Signs (Most Recent):  Temp: 98.5 °F (36.9 °C) (06/02/22 0754)  Pulse: 77 (06/02/22 0846)  Resp: 20 (06/02/22 0759)  BP: 103/60 (06/02/22 0846)  SpO2: (!) 93 % (06/02/22 0754) Vital Signs (24h Range):  Temp:  [98 °F (36.7 °C)-99.2 °F (37.3 °C)] 98.5 °F (36.9 °C)  Pulse:  [] 77  Resp:  [16-22] 20  SpO2:  [92 %-99 %] 93 %  BP: ()/(50-77) 103/60     Weight: 136.1 kg (300  lb)  Body mass index is 51.49 kg/m².    SpO2: (!) 93 %  O2 Device (Oxygen Therapy): nasal cannula      Intake/Output Summary (Last 24 hours) at 6/2/2022 0852  Last data filed at 6/2/2022 0500  Gross per 24 hour   Intake 1320 ml   Output 4600 ml   Net -3280 ml       Lines/Drains/Airways     Drain  Duration           Female External Urinary Catheter 06/01/22 1 day          Peripheral Intravenous Line  Duration                Peripheral IV - Single Lumen -- days         Midline Catheter Insertion/Assessment  - Single Lumen 06/02/22 0347 Left cephalic vein (lateral side of arm) 20g x 10cm <1 day                VTE Risk Mitigation (From admission, onward)         Ordered     enoxaparin injection 40 mg  Daily         06/01/22 1442     IP VTE HIGH RISK PATIENT  Once         06/01/22 1440     Place sequential compression device  Until discontinued         06/01/22 1440                Significant Labs:   Recent Lab Results  (Last 5 results in the past 24 hours)      06/02/22  0414   06/01/22  1643   06/01/22  1433   06/01/22  1019   06/01/22  1007        Procalcitonin   0.20  Comment: A concentration < 0.25 ng/mL represents a low risk of bacterial   infection.  Procalcitonin may not be accurate among patients with localized   infection, recent trauma or major surgery, immunosuppressed state,   invasive fungal infection, renal dysfunction. Decisions regarding   initiation or continuation of antibiotic therapy should not be based   solely on procalcitonin levels.               Albumin 2.7               Alkaline Phosphatase 98               ALT 19               Anion Gap 9               Appearance, UA     Clear           AST 25               Bacteria, UA     Negative           Bilirubin (UA)     Negative           BILIRUBIN TOTAL 1.7  Comment: For infants and newborns, interpretation of results should be based  on gestational age, weight and in agreement with clinical  observations.    Premature Infant recommended reference  ranges:  Up to 24 hours.............<8.0 mg/dL  Up to 48 hours............<12.0 mg/dL  3-5 days..................<15.0 mg/dL  6-29 days.................<15.0 mg/dL    For patients on Eltrombopag therapy, use of Dimension [place] TBIL is   not   recommended.                 Blood Culture, Routine       Gram stain aer bottle: Gram positive cocci in chains resembling Strep   [P]                Gram stain nita bottle: Gram positive cocci in chains resembling Strep   [P]                Positive results previously called 06/02/2022  05:14  [P]         BUN 18               Calcium 8.3               Chloride 100               CO2 29               Color, UA     Yellow           Creatinine 1.1               eGFR if  59.2               eGFR if non  51.3  Comment: Calculation used to obtain the estimated glomerular filtration  rate (eGFR) is the CKD-EPI equation.                  Glucose 110               Glucose, UA     Negative           Hematocrit 34.6               Hemoglobin 11.5               Hyaline Casts, UA     0.4           Ketones, UA     Negative           Lactate, Jose       1.4         Leukocytes, UA     1+           MCH 31.3               MCHC 33.2               MCV 94               Microscopic Comment     SEE COMMENT  Comment: Other formed elements not mentioned in the report are not   present in the microscopic examination.              MPV 11.1               NITRITE UA     Negative           Occult Blood UA     Negative           pH, UA     6.0           Platelets 227               Potassium 3.4               PROTEIN TOTAL 7.4               Protein, UA     Negative  Comment: Recommend a 24 hour urine protein or a urine   protein/creatinine ratio if globulin induced proteinuria is  clinically suspected.              Acceptable         Yes       RBC 3.68               RBC, UA     4           RDW 14.6               SARS-CoV-2 RNA, Amplification, Qual         Negative        Sodium 138               Specific [place], UA     1.015           Specimen UA     Urine, Clean Catch           Squam Epithel, UA     5           UROBILINOGEN UA     1.0           WBC, UA     3           WBC 9.93                                     [P] - Preliminary Result                       Physical Exam  Constitutional:       Appearance: She is obese.   HENT:      Head: Normocephalic and atraumatic.      Nose: Nose normal.      Mouth/Throat:      Mouth: Mucous membranes are moist.      Pharynx: Oropharynx is clear.   Eyes:      Extraocular Movements: Extraocular movements intact.      Conjunctiva/sclera: Conjunctivae normal.      Pupils: Pupils are equal, round, and reactive to light.   Cardiovascular:      Rate and Rhythm: Normal rate and regular rhythm.      Pulses: Normal pulses.      Heart sounds: Normal heart sounds.   Pulmonary:      Effort: Pulmonary effort is normal.      Breath sounds: Normal breath sounds.   Abdominal:      General: Abdomen is flat. Bowel sounds are normal.      Palpations: Abdomen is soft.   Musculoskeletal:         General: Normal range of motion.      Cervical back: Normal range of motion and neck supple.   Skin:     General: Skin is warm and dry.      Capillary Refill: Capillary refill takes 2 to 3 seconds.   Neurological:      General: No focal deficit present.      Mental Status: She is alert and oriented to person, place, and time. Mental status is at baseline.   Psychiatric:         Mood and Affect: Mood normal.         Assessment:     There are no hospital problems to display for this patient.      Home Medications:  No current facility-administered medications on file prior to encounter.     Current Outpatient Medications on File Prior to Encounter   Medication Sig Dispense Refill    ALPRAZolam (XANAX) 0.5 MG tablet Take 1-2 tablets by mouth every 6hrs as needed for anxiety 90 tablet 1    amiodarone (PACERONE) 100 MG Tab Take 1 tablet (100 mg total) by mouth once daily.  30 tablet 0    ascorbic acid, vitamin C, (VITAMIN C) 1000 MG tablet Take 1,000 mg by mouth once daily.      b complex vitamins tablet Take 1 tablet by mouth once daily.      cyclobenzaprine (FLEXERIL) 5 MG tablet Take 1 tablet (5 mg total) by mouth 3 (three) times daily as needed for Muscle spasms. 40 tablet 0    ferrous sulfate (FEOSOL) 325 mg (65 mg iron) Tab tablet Take 325 mg by mouth daily with breakfast.      furosemide (LASIX) 20 MG tablet Take 1 tablet (20 mg total) by mouth once daily. 30 tablet 2    gabapentin (NEURONTIN) 300 MG capsule Take 300 mg by mouth every evening.      HYDROcodone-acetaminophen (NORCO) 7.5-325 mg per tablet Take 1 tablet by mouth every 6 (six) hours as needed for Pain. 100 tablet 0    levothyroxine (SYNTHROID) 150 MCG tablet Take 150 mcg by mouth before breakfast.      metoprolol tartrate (LOPRESSOR) 50 MG tablet Take 50 mg by mouth 2 (two) times daily.      multivitamin (THERAGRAN) per tablet Take 1 tablet by mouth once daily.      pantoprazole (PROTONIX) 40 MG tablet Take 1 tablet (40 mg total) by mouth 2 (two) times daily. 60 tablet 0    traMADoL (ULTRAM) 50 mg tablet Take 1 tablet (50 mg total) by mouth every 6 (six) hours as needed for Pain. 12 tablet 0    vitamin D (VITAMIN D3) 1000 units Tab Take 1,000 Units by mouth 2 (two) times a day.         Current Inpatient Medications:    Current Facility-Administered Medications:     acetaminophen tablet 650 mg, 650 mg, Oral, Q8H PRN, Steven Melo Jr., MD    acetaminophen tablet 650 mg, 650 mg, Oral, Q8H PRN, Steven Melo Jr., MD    ALPRAZolam tablet 0.5 mg, 0.5 mg, Oral, BID PRN, Steven Melo Jr., MD, 0.5 mg at 06/02/22 0850    amiodarone tablet 100 mg, 100 mg, Oral, Daily, Steven Melo Jr., MD, 100 mg at 06/02/22 0845    baclofen tablet 10 mg, 10 mg, Oral, TID PRN, Tutu HTimbo Alvarado III, MD, 10 mg at 06/02/22 0850    cefTRIAXone (ROCEPHIN) 1 g/50 mL D5W IVPB, 1 g, Intravenous, Q12H, Tutu Alvarado III, MD,  Stopped at 06/02/22 0530    enoxaparin injection 40 mg, 40 mg, Subcutaneous, Daily, Marimar Kellogg MD, 40 mg at 06/01/22 1744    furosemide injection 40 mg, 40 mg, Intravenous, Q12H, Alena Latif, ABEL, 40 mg at 06/02/22 0252    gabapentin capsule 300 mg, 300 mg, Oral, QHS, Steven Melo Jr., MD, 300 mg at 06/01/22 2124    HYDROmorphone injection 1 mg, 1 mg, Intravenous, Q3H PRN, Tutu Alvarado III, MD, 1 mg at 06/02/22 0759    levothyroxine tablet 150 mcg, 150 mcg, Oral, Before breakfast, Steven Melo Jr., MD, 150 mcg at 06/02/22 0546    melatonin tablet 6 mg, 6 mg, Oral, Nightly PRN, Steven Melo Jr., MD    metoprolol tartrate (LOPRESSOR) tablet 50 mg, 50 mg, Oral, BID, Steven Melo Jr., MD, 50 mg at 06/02/22 0846    ondansetron injection 4 mg, 4 mg, Intravenous, Q8H PRN, Steven Melo Jr., MD    pantoprazole EC tablet 40 mg, 40 mg, Oral, BID, Steven Melo Jr., MD, 40 mg at 06/02/22 0845    sodium chloride 0.9% flush 10 mL, 10 mL, Intravenous, PRN, Steven Melo Jr., MD, 10 mL at 06/02/22 0044    traMADoL tablet 50 mg, 50 mg, Oral, Q6H PRN, Steven Melo Jr., MD    vancomycin (VANCOCIN) 1,750 mg in dextrose 5 % 500 mL IVPB, 1,750 mg, Intravenous, Q12H, Marimar Kellogg MD    vancomycin (VANCOCIN) 2,500 mg in dextrose 5 % 500 mL IVPB, 2,500 mg, Intravenous, Once, Marimar Kellogg MD, Last Rate: 200 mL/hr at 06/02/22 0751, 2,500 mg at 06/02/22 0751    Pharmacy to dose Vancomycin consult, , , Once **AND** vancomycin - pharmacy to dose, , Intravenous, pharmacy to manage frequency, Tutu Alvarado III, MD    VTE Risk Mitigation (From admission, onward)         Ordered     enoxaparin injection 40 mg  Daily         06/01/22 1442     IP VTE HIGH RISK PATIENT  Once         06/01/22 1440     Place sequential compression device  Until discontinued         06/01/22 1440                    Plan:     Lumbar disc radiculopathy  -defer tx to PCP  Renal Cyst  Acute Pulmonary edema  -Pt  has diuresed >3L since admit. Recommend de escalation to PO diuretic (Lasix 40mg PO daily) as we do not want to overdiurese in setting of moderate to severe AS.  Atrial fibrillation  -Continue BB and amiodarone. Eliquis for anticoagulation  Moderate to Severe AS  -extreme caution with Diuresis   Hypokalemia  -Replete for goal K >4.0    Pt will need full ischemic work up as OP including echo and racquel scan.                Alena Latif NP  Cardiology  [facility]  06/02/2022

## 2022-06-02 NOTE — CONSULTS
[facility] Surg  Cardiology  Consult Note    Patient Name: Aurelia Ruggiero  MRN: [id number]  Admission Date: 6/1/2022  Hospital Length of Stay: 1 days  Code Status: Full Code   Attending Provider: Marimar Kellogg MD   Consulting Provider: Alena Latif NP  Primary Care Physician: Marimar Kellogg MD  Principal Problem:<principal problem not specified>    Patient information was obtained from patient, past medical records and ER records.     Inpatient consult to Cardiology  Consult performed by: Gavin Duran MD  Consult ordered by: Marimar Kellogg MD        Subjective:     Chief Complaint:  Back pain      HPI: 68 y/o F with SigHx of morbid obesity, AF on eliquis, severe AS, and BL carotid disease admitted to medicine for acute heart failure.   Pt has no known hx of CHF. Last echo in January reveals EF 68% with moderate to severe AS and PAP 37mmHg.   Pt came to ED for complaints of low back pain. On arrival Pt sats were 89% on RA. She was also noted to be AF RVR, however, pt states she did not take her medications prior to arrival.  CT was done to rule out PE and revealed no evidence of PE Multifocal scattered ground-glass lung opacities greatest within the right upper lobe suspicious for atypical pneumonia or edema.   On my exam, She denies edema. Denies Cp, denies SOB. She is in AF with CVR. Anticoagulated on eliquis.     Past Medical History:   Diagnosis Date    Adult hypothyroidism     Anxiety     Atherosclerosis of both carotid arteries     Atrial fibrillation     Atrial fibrillation     CHF (congestive heart failure)     Moderate aortic valve stenosis        Past Surgical History:   Procedure Laterality Date    APPENDECTOMY      COLONOSCOPY N/A 1/28/2022    Procedure: COLONOSCOPY;  Surgeon: Jl Terrazas MD;  Location: [facility];  Service: General;  Laterality: N/A;    KNEE SURGERY      TONSILLECTOMY      TREATMENT OF CARDIAC ARRHYTHMIA N/A 10/27/2020    Procedure: Cardioversion  or Defibrillation;  Surgeon: Gavin Duran MD;  Location: [facility];  Service: Cardiovascular;  Laterality: N/A;  APPROVED PER POORNIMA 10/6       Review of patient's allergies indicates:   Allergen Reactions    Fish containing products Itching    Shellfish containing products Itching    Latex, natural rubber Hives       No current facility-administered medications on file prior to encounter.     Current Outpatient Medications on File Prior to Encounter   Medication Sig    ALPRAZolam (XANAX) 0.5 MG tablet Take 1-2 tablets by mouth every 6hrs as needed for anxiety    amiodarone (PACERONE) 100 MG Tab Take 1 tablet (100 mg total) by mouth once daily.    ascorbic acid, vitamin C, (VITAMIN C) 1000 MG tablet Take 1,000 mg by mouth once daily.    b complex vitamins tablet Take 1 tablet by mouth once daily.    cyclobenzaprine (FLEXERIL) 5 MG tablet Take 1 tablet (5 mg total) by mouth 3 (three) times daily as needed for Muscle spasms.    ferrous sulfate (FEOSOL) 325 mg (65 mg iron) Tab tablet Take 325 mg by mouth daily with breakfast.    furosemide (LASIX) 20 MG tablet Take 1 tablet (20 mg total) by mouth once daily.    gabapentin (NEURONTIN) 300 MG capsule Take 300 mg by mouth every evening.    HYDROcodone-acetaminophen (NORCO) 7.5-325 mg per tablet Take 1 tablet by mouth every 6 (six) hours as needed for Pain.    levothyroxine (SYNTHROID) 150 MCG tablet Take 150 mcg by mouth before breakfast.    metoprolol tartrate (LOPRESSOR) 50 MG tablet Take 50 mg by mouth 2 (two) times daily.    multivitamin (THERAGRAN) per tablet Take 1 tablet by mouth once daily.    pantoprazole (PROTONIX) 40 MG tablet Take 1 tablet (40 mg total) by mouth 2 (two) times daily.    traMADoL (ULTRAM) 50 mg tablet Take 1 tablet (50 mg total) by mouth every 6 (six) hours as needed for Pain.    vitamin D (VITAMIN D3) 1000 units Tab Take 1,000 Units by mouth 2 (two) times a day.     Family History     Problem Relation (Age of Onset)     Atrial fibrillation Sister    Cancer Brother    Heart failure Father    Hypertension Sister, Brother        Tobacco Use    Smoking status: Never Smoker    Smokeless tobacco: Never Used   Substance and Sexual Activity    Alcohol use: Never    Drug use: Never    Sexual activity: Not on file     Review of Systems:  Review of Systems   Musculoskeletal: Positive for back pain.   All other systems reviewed and are negative.      Objective:     Vital Signs (Most Recent):  Temp: 98.5 °F (36.9 °C) (06/02/22 0754)  Pulse: 77 (06/02/22 0754)  Resp: 20 (06/02/22 0759)  BP: 103/60 (06/02/22 0754)  SpO2: (!) 93 % (06/02/22 0754) Vital Signs (24h Range):  Temp:  [98 °F (36.7 °C)-99.2 °F (37.3 °C)] 98.5 °F (36.9 °C)  Pulse:  [] 77  Resp:  [16-22] 20  SpO2:  [92 %-99 %] 93 %  BP: ()/(50-77) 103/60     Weight: 136.1 kg (300 lb)  Body mass index is 51.49 kg/m².    SpO2: (!) 93 %  O2 Device (Oxygen Therapy): nasal cannula      Intake/Output Summary (Last 24 hours) at 6/2/2022 0825  Last data filed at 6/2/2022 0500  Gross per 24 hour   Intake 1320 ml   Output 4600 ml   Net -3280 ml       Lines/Drains/Airways     Drain  Duration           Female External Urinary Catheter 06/01/22 1 day          Peripheral Intravenous Line  Duration                Peripheral IV - Single Lumen -- days         Midline Catheter Insertion/Assessment  - Single Lumen 06/02/22 0347 Left cephalic vein (lateral side of arm) 20g x 10cm <1 day                  Significant Labs:  Recent Lab Results  (Last 5 results in the past 24 hours)      06/02/22  0414   06/01/22  1643   06/01/22  1433   06/01/22  1019   06/01/22  1007        Procalcitonin   0.20  Comment: A concentration < 0.25 ng/mL represents a low risk of bacterial   infection.  Procalcitonin may not be accurate among patients with localized   infection, recent trauma or major surgery, immunosuppressed state,   invasive fungal infection, renal dysfunction. Decisions regarding   initiation or  continuation of antibiotic therapy should not be based   solely on procalcitonin levels.               Albumin 2.7               Alkaline Phosphatase 98               ALT 19               Anion Gap 9               Appearance, UA     Clear           AST 25               Bacteria, UA     Negative           Bilirubin (UA)     Negative           BILIRUBIN TOTAL 1.7  Comment: For infants and newborns, interpretation of results should be based  on gestational age, weight and in agreement with clinical  observations.    Premature Infant recommended reference ranges:  Up to 24 hours.............<8.0 mg/dL  Up to 48 hours............<12.0 mg/dL  3-5 days..................<15.0 mg/dL  6-29 days.................<15.0 mg/dL    For patients on Eltrombopag therapy, use of Dimension [place] TBIL is   not   recommended.                 Blood Culture, Routine       Gram stain aer bottle: Gram positive cocci in chains resembling Strep   [P]                Gram stain nita bottle: Gram positive cocci in chains resembling Strep   [P]                Positive results previously called 06/02/2022  05:14  [P]         BUN 18               Calcium 8.3               Chloride 100               CO2 29               Color, UA     Yellow           Creatinine 1.1               eGFR if  59.2               eGFR if non  51.3  Comment: Calculation used to obtain the estimated glomerular filtration  rate (eGFR) is the CKD-EPI equation.                  Glucose 110               Glucose, UA     Negative           Hematocrit 34.6               Hemoglobin 11.5               Hyaline Casts, UA     0.4           Ketones, UA     Negative           Lactate, Jose       1.4         Leukocytes, UA     1+           MCH 31.3               MCHC 33.2               MCV 94               Microscopic Comment     SEE COMMENT  Comment: Other formed elements not mentioned in the report are not   present in the microscopic examination.               MPV 11.1               NITRITE UA     Negative           Occult Blood UA     Negative           pH, UA     6.0           Platelets 227               Potassium 3.4               PROTEIN TOTAL 7.4               Protein, UA     Negative  Comment: Recommend a 24 hour urine protein or a urine   protein/creatinine ratio if globulin induced proteinuria is  clinically suspected.              Acceptable         Yes       RBC 3.68               RBC, UA     4           RDW 14.6               SARS-CoV-2 RNA, Amplification, Qual         Negative       Sodium 138               Specific [place], UA     1.015           Specimen UA     Urine, Clean Catch           Squam Epithel, UA     5           UROBILINOGEN UA     1.0           WBC, UA     3           WBC 9.93                                     [P] - Preliminary Result               Significant Imaging:  Radiology: CT scan: cta chest    Last Echocardiogram:  1/26/2022  EF 68%  Moderate L atrial and mild right atrial enlargment  Moderate to severe AS  Moderate MR  PAP 37mmHg    Last Nuclear &/or ALEX:  8/11/2017  This is a normal perfusion study, no perfusion defects noted. There is no evidence of ischemia.   This scan is suggestive of low risk for future cardiovascular events.   The left ventricular cavity is noted to be mildly enlarged on the stress study. The left ventricular ejection fraction was calculated to be 69% and left ventricular global function is normal.   The study quality is good.          EKG:    Results for orders placed or performed during the hospital encounter of 06/01/22   EKG 12-lead    Narrative    Test Reason : M54.9,    Vent. Rate : 104 BPM     Atrial Rate : 100 BPM     P-R Int : 000 ms          QRS Dur : 088 ms      QT Int : 344 ms       P-R-T Axes : 000 -39 058 degrees     QTc Int : 452 ms    Atrial fibrillation with rapid ventricular response  Left axis deviation  Nonspecific ST abnormality  Abnormal ECG  When compared with ECG of  25-JAN-2022 15:49,  Vent. rate has increased BY  42 BPM  Confirmed by Kathya Teague MD ([id number]) on 6/1/2022 1:04:24 PM    Referred By: EDEL   SELF           Confirmed By:Kathya Teague MD       Telemetry:  AF CVR          Physical Exam  Vitals reviewed.   Constitutional:       General: She is not in acute distress.     Appearance: She is obese.   HENT:      Head: Normocephalic.   Eyes:      Conjunctiva/sclera: Conjunctivae normal.   Cardiovascular:      Rate and Rhythm: Normal rate. Rhythm irregular.      Heart sounds: Normal heart sounds.   Pulmonary:      Effort: Pulmonary effort is normal.      Comments: Shallow, diminished to bases   Abdominal:      Palpations: Abdomen is soft.   Musculoskeletal:         General: Normal range of motion.   Skin:     General: Skin is warm and dry.   Neurological:      General: No focal deficit present.      Mental Status: She is alert and oriented to person, place, and time.         Home Medications:   No current facility-administered medications on file prior to encounter.     Current Outpatient Medications on File Prior to Encounter   Medication Sig Dispense Refill    ALPRAZolam (XANAX) 0.5 MG tablet Take 1-2 tablets by mouth every 6hrs as needed for anxiety 90 tablet 1    amiodarone (PACERONE) 100 MG Tab Take 1 tablet (100 mg total) by mouth once daily. 30 tablet 0    ascorbic acid, vitamin C, (VITAMIN C) 1000 MG tablet Take 1,000 mg by mouth once daily.      b complex vitamins tablet Take 1 tablet by mouth once daily.      cyclobenzaprine (FLEXERIL) 5 MG tablet Take 1 tablet (5 mg total) by mouth 3 (three) times daily as needed for Muscle spasms. 40 tablet 0    ferrous sulfate (FEOSOL) 325 mg (65 mg iron) Tab tablet Take 325 mg by mouth daily with breakfast.      furosemide (LASIX) 20 MG tablet Take 1 tablet (20 mg total) by mouth once daily. 30 tablet 2    gabapentin (NEURONTIN) 300 MG capsule Take 300 mg by mouth every evening.      HYDROcodone-acetaminophen  (NORCO) 7.5-325 mg per tablet Take 1 tablet by mouth every 6 (six) hours as needed for Pain. 100 tablet 0    levothyroxine (SYNTHROID) 150 MCG tablet Take 150 mcg by mouth before breakfast.      metoprolol tartrate (LOPRESSOR) 50 MG tablet Take 50 mg by mouth 2 (two) times daily.      multivitamin (THERAGRAN) per tablet Take 1 tablet by mouth once daily.      pantoprazole (PROTONIX) 40 MG tablet Take 1 tablet (40 mg total) by mouth 2 (two) times daily. 60 tablet 0    traMADoL (ULTRAM) 50 mg tablet Take 1 tablet (50 mg total) by mouth every 6 (six) hours as needed for Pain. 12 tablet 0    vitamin D (VITAMIN D3) 1000 units Tab Take 1,000 Units by mouth 2 (two) times a day.         Current Inpatient Medications:    Current Facility-Administered Medications:     acetaminophen tablet 650 mg, 650 mg, Oral, Q8H PRN, Steven Melo Jr., MD    acetaminophen tablet 650 mg, 650 mg, Oral, Q8H PRN, Steven Melo Jr., MD    ALPRAZolam tablet 0.5 mg, 0.5 mg, Oral, BID PRN, Steven Melo Jr., MD, 0.5 mg at 06/01/22 2125    amiodarone tablet 100 mg, 100 mg, Oral, Daily, Steven Melo Jr., MD, 100 mg at 06/01/22 1744    baclofen tablet 10 mg, 10 mg, Oral, TID PRN, Tutu Alvarado III, MD, 10 mg at 06/01/22 2127    cefTRIAXone (ROCEPHIN) 1 g/50 mL D5W IVPB, 1 g, Intravenous, Q12H, Tutu Alvarado III, MD, Stopped at 06/02/22 0530    enoxaparin injection 40 mg, 40 mg, Subcutaneous, Daily, Marimar Kellogg MD, 40 mg at 06/01/22 1744    furosemide injection 40 mg, 40 mg, Intravenous, Q12H, Alena Latif NP, 40 mg at 06/02/22 0252    gabapentin capsule 300 mg, 300 mg, Oral, QHS, Steven Melo Jr., MD, 300 mg at 06/01/22 2124    HYDROmorphone injection 1 mg, 1 mg, Intravenous, Q3H PRN, Tutu Alvarado III, MD, 1 mg at 06/02/22 0759    levothyroxine tablet 150 mcg, 150 mcg, Oral, Before breakfast, Steven Melo Jr., MD, 150 mcg at 06/02/22 0546    melatonin tablet 6 mg, 6 mg, Oral, Nightly PRN, Steven  Lorie Tong MD    metoprolol tartrate (LOPRESSOR) tablet 50 mg, 50 mg, Oral, BID, Steven Melo Jr., MD, 50 mg at 06/01/22 2124    ondansetron injection 4 mg, 4 mg, Intravenous, Q8H PRN, Steven Melo Jr., MD    pantoprazole EC tablet 40 mg, 40 mg, Oral, BID, Steven Melo Jr., MD, 40 mg at 06/01/22 2123    sodium chloride 0.9% flush 10 mL, 10 mL, Intravenous, PRN, Steven Melo Jr., MD, 10 mL at 06/02/22 0044    traMADoL tablet 50 mg, 50 mg, Oral, Q6H PRN, Steven Melo Jr., MD    vancomycin (VANCOCIN) 1,750 mg in dextrose 5 % 500 mL IVPB, 1,750 mg, Intravenous, Q12H, Marimar Kellogg MD    vancomycin (VANCOCIN) 2,500 mg in dextrose 5 % 500 mL IVPB, 2,500 mg, Intravenous, Once, Marimar Kellogg MD, Last Rate: 200 mL/hr at 06/02/22 0751, 2,500 mg at 06/02/22 0751    Pharmacy to dose Vancomycin consult, , , Once **AND** vancomycin - pharmacy to dose, , Intravenous, pharmacy to manage frequency, Tutu Alvarado III, MD         VTE Risk Mitigation (From admission, onward)         Ordered     enoxaparin injection 40 mg  Daily         06/01/22 1442     IP VTE HIGH RISK PATIENT  Once         06/01/22 1440     Place sequential compression device  Until discontinued         06/01/22 1440                Assessment:     Lumbar disc radiculopathy  -defer tx to PCP  Renal Cyst  Shortness of breath  -? Pulmonary edema versus Pneumonia. Obtain Procal for further evaluation as pt has no known hx of CHF  Atrial fibrillation  -Continue BB and amiodarone. Eliquis for anticoagulation  Moderate to Severe AS  -extreme caution with Diuresis           Thank you for your consult. Will follow                 Alena Latif NP  Cardiology  [facility] Surg  06/02/2022 8:25 AM

## 2022-06-03 ENCOUNTER — CLINICAL SUPPORT (OUTPATIENT)
Dept: CARDIOLOGY | Facility: HOSPITAL | Age: 67
DRG: 871 | End: 2022-06-03
Payer: MEDICARE

## 2022-06-03 VITALS
BODY MASS INDEX: 50.02 KG/M2 | DIASTOLIC BLOOD PRESSURE: 64 MMHG | SYSTOLIC BLOOD PRESSURE: 106 MMHG | HEIGHT: 64 IN | WEIGHT: 293 LBS

## 2022-06-03 PROBLEM — G72.9 MYOPATHY: Status: ACTIVE | Noted: 2022-06-03

## 2022-06-03 PROBLEM — E87.6 HYPOKALEMIA: Status: ACTIVE | Noted: 2022-06-03

## 2022-06-03 LAB
ALBUMIN SERPL BCP-MCNC: 2.3 G/DL (ref 3.5–5.2)
ALP SERPL-CCNC: 100 U/L (ref 55–135)
ALT SERPL W/O P-5'-P-CCNC: 18 U/L (ref 10–44)
ANION GAP SERPL CALC-SCNC: 5 MMOL/L (ref 8–16)
AST SERPL-CCNC: 27 U/L (ref 10–40)
BASOPHILS # BLD AUTO: 0.06 K/UL (ref 0–0.2)
BASOPHILS NFR BLD: 0.7 % (ref 0–1.9)
BILIRUB SERPL-MCNC: 1.1 MG/DL (ref 0.1–1)
BUN SERPL-MCNC: 16 MG/DL (ref 8–23)
CALCIUM SERPL-MCNC: 7.9 MG/DL (ref 8.7–10.5)
CHLORIDE SERPL-SCNC: 100 MMOL/L (ref 95–110)
CO2 SERPL-SCNC: 30 MMOL/L (ref 23–29)
CREAT SERPL-MCNC: 1.1 MG/DL (ref 0.5–1.4)
CRP SERPL-MCNC: 14.1 MG/DL (ref 0–0.75)
DIFFERENTIAL METHOD: ABNORMAL
EOSINOPHIL # BLD AUTO: 0.2 K/UL (ref 0–0.5)
EOSINOPHIL NFR BLD: 2.2 % (ref 0–8)
ERYTHROCYTE [DISTWIDTH] IN BLOOD BY AUTOMATED COUNT: 14.1 % (ref 11.5–14.5)
ERYTHROCYTE [SEDIMENTATION RATE] IN BLOOD: 93 MM/HR (ref 0–20)
EST. GFR  (AFRICAN AMERICAN): 59.2 ML/MIN/1.73 M^2
EST. GFR  (NON AFRICAN AMERICAN): 51.3 ML/MIN/1.73 M^2
GLUCOSE SERPL-MCNC: 119 MG/DL (ref 70–110)
HCT VFR BLD AUTO: 34.1 % (ref 37–48.5)
HGB BLD-MCNC: 11.4 G/DL (ref 12–16)
IMM GRANULOCYTES # BLD AUTO: 0.03 K/UL (ref 0–0.04)
IMM GRANULOCYTES NFR BLD AUTO: 0.3 % (ref 0–0.5)
LYMPHOCYTES # BLD AUTO: 1.9 K/UL (ref 1–4.8)
LYMPHOCYTES NFR BLD: 20.9 % (ref 18–48)
MAGNESIUM SERPL-MCNC: 2.1 MG/DL (ref 1.6–2.6)
MCH RBC QN AUTO: 31.5 PG (ref 27–31)
MCHC RBC AUTO-ENTMCNC: 33.4 G/DL (ref 32–36)
MCV RBC AUTO: 94 FL (ref 82–98)
MONOCYTES # BLD AUTO: 1.2 K/UL (ref 0.3–1)
MONOCYTES NFR BLD: 13 % (ref 4–15)
NEUTROPHILS # BLD AUTO: 5.7 K/UL (ref 1.8–7.7)
NEUTROPHILS NFR BLD: 62.9 % (ref 38–73)
NRBC BLD-RTO: 0 /100 WBC
PLATELET # BLD AUTO: 235 K/UL (ref 150–450)
PMV BLD AUTO: 11 FL (ref 9.2–12.9)
POTASSIUM SERPL-SCNC: 3.3 MMOL/L (ref 3.5–5.1)
PROT SERPL-MCNC: 7.2 G/DL (ref 6–8.4)
RBC # BLD AUTO: 3.62 M/UL (ref 4–5.4)
SODIUM SERPL-SCNC: 135 MMOL/L (ref 136–145)
VANCOMYCIN TROUGH SERPL-MCNC: 31.5 UG/ML (ref 10–22)
WBC # BLD AUTO: 9.09 K/UL (ref 3.9–12.7)

## 2022-06-03 PROCEDURE — 80053 COMPREHEN METABOLIC PANEL: CPT | Performed by: INTERNAL MEDICINE

## 2022-06-03 PROCEDURE — 94761 N-INVAS EAR/PLS OXIMETRY MLT: CPT

## 2022-06-03 PROCEDURE — 97166 OT EVAL MOD COMPLEX 45 MIN: CPT

## 2022-06-03 PROCEDURE — 11000001 HC ACUTE MED/SURG PRIVATE ROOM

## 2022-06-03 PROCEDURE — 80202 ASSAY OF VANCOMYCIN: CPT | Performed by: INTERNAL MEDICINE

## 2022-06-03 PROCEDURE — 25000003 PHARM REV CODE 250: Performed by: INTERNAL MEDICINE

## 2022-06-03 PROCEDURE — 99900035 HC TECH TIME PER 15 MIN (STAT)

## 2022-06-03 PROCEDURE — 83735 ASSAY OF MAGNESIUM: CPT | Performed by: INTERNAL MEDICINE

## 2022-06-03 PROCEDURE — 86140 C-REACTIVE PROTEIN: CPT | Performed by: INTERNAL MEDICINE

## 2022-06-03 PROCEDURE — 27000221 HC OXYGEN, UP TO 24 HOURS

## 2022-06-03 PROCEDURE — 63600175 PHARM REV CODE 636 W HCPCS: Performed by: INTERNAL MEDICINE

## 2022-06-03 PROCEDURE — 99900031 HC PATIENT EDUCATION (STAT)

## 2022-06-03 PROCEDURE — 36415 COLL VENOUS BLD VENIPUNCTURE: CPT | Performed by: INTERNAL MEDICINE

## 2022-06-03 PROCEDURE — 85651 RBC SED RATE NONAUTOMATED: CPT | Performed by: INTERNAL MEDICINE

## 2022-06-03 PROCEDURE — 93306 TTE W/DOPPLER COMPLETE: CPT

## 2022-06-03 PROCEDURE — 25000003 PHARM REV CODE 250: Performed by: NURSE PRACTITIONER

## 2022-06-03 PROCEDURE — 85025 COMPLETE CBC W/AUTO DIFF WBC: CPT | Performed by: INTERNAL MEDICINE

## 2022-06-03 PROCEDURE — 25000003 PHARM REV CODE 250: Performed by: FAMILY MEDICINE

## 2022-06-03 RX ORDER — POTASSIUM CHLORIDE 20 MEQ/1
20 TABLET, EXTENDED RELEASE ORAL DAILY
Status: DISCONTINUED | OUTPATIENT
Start: 2022-06-03 | End: 2022-06-06

## 2022-06-03 RX ORDER — POTASSIUM CHLORIDE 20 MEQ/1
20 TABLET, EXTENDED RELEASE ORAL ONCE
Status: COMPLETED | OUTPATIENT
Start: 2022-06-03 | End: 2022-06-03

## 2022-06-03 RX ADMIN — APIXABAN 5 MG: 5 TABLET, FILM COATED ORAL at 09:06

## 2022-06-03 RX ADMIN — BACLOFEN 10 MG: 10 TABLET ORAL at 03:06

## 2022-06-03 RX ADMIN — VANCOMYCIN HYDROCHLORIDE 1750 MG: 1 INJECTION, POWDER, LYOPHILIZED, FOR SOLUTION INTRAVENOUS at 06:06

## 2022-06-03 RX ADMIN — PANTOPRAZOLE SODIUM 40 MG: 40 TABLET, DELAYED RELEASE ORAL at 09:06

## 2022-06-03 RX ADMIN — CEFTRIAXONE SODIUM 1 G: 1 INJECTION, POWDER, FOR SOLUTION INTRAMUSCULAR; INTRAVENOUS at 04:06

## 2022-06-03 RX ADMIN — GABAPENTIN 300 MG: 300 CAPSULE ORAL at 09:06

## 2022-06-03 RX ADMIN — ALPRAZOLAM 0.5 MG: 0.5 TABLET ORAL at 09:06

## 2022-06-03 RX ADMIN — HYDROMORPHONE HYDROCHLORIDE 1 MG: 1 INJECTION, SOLUTION INTRAMUSCULAR; INTRAVENOUS; SUBCUTANEOUS at 12:06

## 2022-06-03 RX ADMIN — POTASSIUM CHLORIDE 20 MEQ: 20 TABLET, EXTENDED RELEASE ORAL at 03:06

## 2022-06-03 RX ADMIN — AMIODARONE HYDROCHLORIDE 100 MG: 100 TABLET ORAL at 08:06

## 2022-06-03 RX ADMIN — POTASSIUM CHLORIDE 20 MEQ: 20 TABLET, EXTENDED RELEASE ORAL at 12:06

## 2022-06-03 RX ADMIN — METOPROLOL TARTRATE 50 MG: 50 TABLET, FILM COATED ORAL at 08:06

## 2022-06-03 RX ADMIN — SPIRONOLACTONE 12.5 MG: 25 TABLET, FILM COATED ORAL at 12:06

## 2022-06-03 RX ADMIN — METOPROLOL TARTRATE 50 MG: 50 TABLET, FILM COATED ORAL at 09:06

## 2022-06-03 RX ADMIN — LEVOTHYROXINE SODIUM 150 MCG: 150 TABLET ORAL at 05:06

## 2022-06-03 RX ADMIN — PANTOPRAZOLE SODIUM 40 MG: 40 TABLET, DELAYED RELEASE ORAL at 08:06

## 2022-06-03 RX ADMIN — HYDROMORPHONE HYDROCHLORIDE 1 MG: 1 INJECTION, SOLUTION INTRAMUSCULAR; INTRAVENOUS; SUBCUTANEOUS at 09:06

## 2022-06-03 RX ADMIN — HYDROMORPHONE HYDROCHLORIDE 1 MG: 1 INJECTION, SOLUTION INTRAMUSCULAR; INTRAVENOUS; SUBCUTANEOUS at 06:06

## 2022-06-03 RX ADMIN — APIXABAN 5 MG: 5 TABLET, FILM COATED ORAL at 08:06

## 2022-06-03 RX ADMIN — TRAMADOL HYDROCHLORIDE 50 MG: 50 TABLET ORAL at 03:06

## 2022-06-03 NOTE — HOSPITAL COURSE
6/3 ND pt more peppy this am - still havign lower back and side pain.   Informed pt and son of her sepsis dx and need for iv abxs.  Breathing is better per pt.  6/4 WC:  Tolerating treatment well thus far.  Still very fatigued.   6/5 WC:  Surveillance cultures negative thus far.  Patient remains fatigued with diffuse pain.  6/6 SD: Back pain improved, moving more with less pain - continue therapy efforts. Repeat blood cultures in process. Continue abx therapy (day 5).  6/7 SD: Downgrade abx therapy to ampicillin. Rash to back - pt feels related to laundry detergent - Benadryl PRN.  6/8 SD: Continue abx therapy. Rash improved. Continue therapy efforts.  6/9 SD: Continue abx therapy. Improved effort with OT yesterday. Continue PT/OT.  6/10 SD: C/O cough and wheezing. Consult for IPR. Continue abx therapy.  6/11 KGY: complete D7 IV abx. Continue PT/OT. Awaiting approval for continued IP rehab with swing bed to regain strength  6/12/2022 FM:  Patient continues to feel better and work with therapy.  We are waiting approval for a swing bed.  No other new changes today.  6/13 SD: Pt showing slow progress with therapy, recommends SNF placement. Pt agreeable to go to [facility] in [place].  6/14 SD: Pt accepted to SNF

## 2022-06-03 NOTE — PT/OT/SLP EVAL
Occupational Therapy   Evaluation    Name: Aurelia Ruggiero  MRN: [id number]  Admitting Diagnosis:  Sepsis  Recent Surgery: * No surgery found *      Recommendations:     Discharge Recommendations: other (see comments) (TBD closer to dicharge)  Discharge Equipment Recommendations:  none  Barriers to discharge:  Other (Comment) (current medical status)    Assessment:     Aurelia Ruggiero is a 67 y.o. female with a medical diagnosis of Sepsis.  She presents with Weakness and back pain. Performance deficits affecting function: weakness, impaired endurance, impaired self care skills, impaired functional mobilty.      Rehab Prognosis: Fair; patient would benefit from acute skilled OT services to address these deficits and reach maximum level of function.       Plan:     Patient to be seen 6 x/week to address the above listed problems via self-care/home management, therapeutic activities, therapeutic exercises  · Plan of Care Expires: 06/17/22  · Plan of Care Reviewed with: patient    Subjective     Chief Complaint: Weakness    Occupational Profile:  Living Environment: Home Independently  Previous level of function: Modified [place]  Equipment Used at Home:  3-in-1 commode, grab bar, cane, quad, raised toilet, shower chair, walker, rolling, wheelchair  Assistance upon Discharge: TBD closer to discharge    Pain/Comfort:  · Pain Rating 1: 6/10  · Location - Orientation 1: lower  · Location 1: back  · Pain Addressed 1: Reposition  · Pain Rating Post-Intervention 1: 3/10    Patients cultural, spiritual, Oriental orthodox conflicts given the current situation: no    Objective:     Communicated with: Nurse prior to session.  Patient found supine with telemetry upon OT entry to room.    General Precautions: Standard, fall   Orthopedic Precautions:N/A   Braces: N/A  Respiratory Status: Room air    Occupational Performance:    Bed Mobility:    · Patient completed Rolling/Turning to Left with  maximal assistance  · Patient completed  Rolling/Turning to Right with maximal assistance  · Patient completed Scooting/Bridging with minimum assistance    Functional Mobility/Transfers:  · Functional Mobility: Not able to complete due to client refusing to sit at EOB    Activities of Daily Living:  · Feeding:  independence    · Grooming: moderate assistance    · Bathing: maximal assistance    · Upper Body Dressing: maximal assistance    · Toileting: maximal assistance      Cognitive/Visual Perceptual:  Cognitive/Psychosocial Skills:     -       Oriented to: Person, Place, Time and Situation   -       Follows Commands/attention:Follows multistep  commands  -       Safety awareness/insight to disability: intact     Physical Exam:  Upper Extremity Range of Motion:     -       Right Upper Extremity: WFL  -       Left Upper Extremity: WFL   Strength:    -       Right Upper Extremity: WFL  -       Left Upper Extremity: WFL    AMPAC 6 Click ADL:  AMPAC Total Score: 12    Treatment & Education:  Pt was alert and willing to complete OT evaluation. Pt reported pain in her lower back at a 6/10 whithout movement and a 9/10 with movement. Pt refused to sit at EOB due to back pain this day. Pt repositioned in bed with pillow on left side to decrease back pain. Pt reporting pain to be better after repositioned.  Education:    Patient left supine with all lines intact and call button in reach    GOALS:   Multidisciplinary Problems     Occupational Therapy Goals        Problem: Occupational Therapy    Goal Priority Disciplines Outcome Interventions   Occupational Therapy Goal     OT, PT/OT     Description: Goals to be met by: discharge    Patient will increase functional independence with ADLs by performing:    UE Dressing with Minimal Assistance.  LE Dressing with Minimal Assistance.  Toileting from toilet with Minimal Assistance for hygiene and clothing management.   Sitting at edge of bed x10 minutes with Minimal Assistance.  Supine to sit with Minimal  Assistance.                     History:     Past Medical History:   Diagnosis Date    Adult hypothyroidism     Anxiety     Atherosclerosis of both carotid arteries     Atrial fibrillation     Atrial fibrillation     CHF (congestive heart failure)     Moderate aortic valve stenosis        Past Surgical History:   Procedure Laterality Date    APPENDECTOMY      COLONOSCOPY N/A 1/28/2022    Procedure: COLONOSCOPY;  Surgeon: Jl Terrazas MD;  Location: [facility] ENDO;  Service: General;  Laterality: N/A;    KNEE SURGERY      TONSILLECTOMY      TREATMENT OF CARDIAC ARRHYTHMIA N/A 10/27/2020    Procedure: Cardioversion or Defibrillation;  Surgeon: Gavin Duran MD;  Location: [facility] CATH;  Service: Cardiovascular;  Laterality: N/A;  APPROVED PER POORNIMA 10/6       Time Tracking:     OT Date of Treatment: 06/03/22  OT Start Time: 1020  OT Stop Time: 1047  OT Total Time (min): 27 min    Billable Minutes:Evaluation 27    6/3/2022

## 2022-06-03 NOTE — CARE UPDATE
Given pt with positive blood cultures, will obtain ALEX for evaluation of Endocarditis. Further recs to follow. Continue abx per medicine

## 2022-06-03 NOTE — ASSESSMENT & PLAN NOTE
"This patient does have evidence of infective focus  My overall impression is sepsis. Vital signs were reviewed and noted in progress note.  Antibiotics given-   Antibiotics (From admission, onward)            Start     Stop Route Frequency Ordered    06/02/22 1800  vancomycin (VANCOCIN) 1,750 mg in dextrose 5 % 500 mL IVPB         -- IV Every 12 hours (non-standard times) 06/02/22 0447    06/02/22 0537  vancomycin - pharmacy to dose  (vancomycin IVPB)        "And" Linked Group Details    -- IV pharmacy to manage frequency 06/02/22 0437    06/02/22 0500  cefTRIAXone (ROCEPHIN) 1 g/50 mL D5W IVPB         06/09 0459 IV Every 12 hours (non-standard times) 06/02/22 0437        Cultures were taken-   Microbiology Results (last 7 days)     Procedure Component Value Units Date/Time    Blood culture x two cultures. Draw prior to antibiotics. [[id number]]  (Abnormal) Collected: 06/01/22 1019    Order Status: Completed Specimen: Blood Updated: 06/03/22 1133     Blood Culture, Routine Gram stain aer bottle: Gram positive cocci in chains resembling Strep       Gram stain nita bottle: Gram positive cocci in chains resembling Strep       Positive results previously called 06/02/2022  05:14      ENTEROCOCCUS FAECALIS  For susceptibility see order # [id number]      Narrative:      Aerobic and anaerobic    Blood culture x two cultures. Draw prior to antibiotics. [[id number]]  (Abnormal) Collected: 06/01/22 1002    Order Status: Completed Specimen: Blood Updated: 06/03/22 1132     Blood Culture, Routine Gram stain nita bottle: Gram positive cocci in chains resembling Strep       Results called to and read back by: Karen Davis RN 06/02/2022        03:36      Gram stain aer bottle: Gram positive cocci in chains resembling Strep       Positive results previously called 06/02/2022  05:15      ENTEROCOCCUS FAECALIS  Susceptibility pending      Narrative:      Aerobic and anaerobic        Latest lactate reviewed, they are-  Recent Labs "   Lab 06/01/22  1019   LACTATE 1.4       Organ dysfunction indicated by Acute respiratory failure  Source- uti, bacteremia    Source control Achieved by- iv abxs  6/3 cont iv abxs- follow sensitivites

## 2022-06-03 NOTE — SUBJECTIVE & OBJECTIVE
Past Medical History:   Diagnosis Date    Adult hypothyroidism     Anxiety     Atherosclerosis of both carotid arteries     Atrial fibrillation     Atrial fibrillation     CHF (congestive heart failure)     Moderate aortic valve stenosis        Past Surgical History:   Procedure Laterality Date    APPENDECTOMY      COLONOSCOPY N/A 1/28/2022    Procedure: COLONOSCOPY;  Surgeon: Jl Terrazas MD;  Location: [facility] ENDO;  Service: General;  Laterality: N/A;    KNEE SURGERY      TONSILLECTOMY      TREATMENT OF CARDIAC ARRHYTHMIA N/A 10/27/2020    Procedure: Cardioversion or Defibrillation;  Surgeon: Gavin Duran MD;  Location: [facility] CATH;  Service: Cardiovascular;  Laterality: N/A;  APPROVED PER POORNIMA 10/6       Review of patient's allergies indicates:   Allergen Reactions    Fish containing products Itching    Shellfish containing products Itching    Latex, natural rubber Hives       No current facility-administered medications on file prior to encounter.     Current Outpatient Medications on File Prior to Encounter   Medication Sig    ALPRAZolam (XANAX) 0.5 MG tablet Take 1-2 tablets by mouth every 6hrs as needed for anxiety    amiodarone (PACERONE) 100 MG Tab Take 1 tablet (100 mg total) by mouth once daily.    ascorbic acid, vitamin C, (VITAMIN C) 1000 MG tablet Take 1,000 mg by mouth once daily.    b complex vitamins tablet Take 1 tablet by mouth once daily.    cyclobenzaprine (FLEXERIL) 5 MG tablet Take 1 tablet (5 mg total) by mouth 3 (three) times daily as needed for Muscle spasms.    ferrous sulfate (FEOSOL) 325 mg (65 mg iron) Tab tablet Take 325 mg by mouth daily with breakfast.    furosemide (LASIX) 20 MG tablet Take 1 tablet (20 mg total) by mouth once daily.    gabapentin (NEURONTIN) 300 MG capsule Take 300 mg by mouth every evening.    HYDROcodone-acetaminophen (NORCO) 7.5-325 mg per tablet Take 1 tablet by mouth every 6 (six) hours as needed for Pain.    levothyroxine  (SYNTHROID) 150 MCG tablet Take 150 mcg by mouth before breakfast.    metoprolol tartrate (LOPRESSOR) 50 MG tablet Take 50 mg by mouth 2 (two) times daily.    multivitamin (THERAGRAN) per tablet Take 1 tablet by mouth once daily.    pantoprazole (PROTONIX) 40 MG tablet Take 1 tablet (40 mg total) by mouth 2 (two) times daily.    traMADoL (ULTRAM) 50 mg tablet Take 1 tablet (50 mg total) by mouth every 6 (six) hours as needed for Pain.    vitamin D (VITAMIN D3) 1000 units Tab Take 1,000 Units by mouth 2 (two) times a day.     Family History       Problem Relation (Age of Onset)    Atrial fibrillation Sister    Cancer Brother    Heart failure Father    Hypertension Sister, Brother          Tobacco Use    Smoking status: Never Smoker    Smokeless tobacco: Never Used   Substance and Sexual Activity    Alcohol use: Never    Drug use: Never    Sexual activity: Not on file     Review of Systems   Constitutional:  Positive for activity change, chills and fatigue. Negative for fever.   HENT:  Negative for sinus pressure and sinus pain.    Respiratory:  Positive for cough and shortness of breath. Negative for wheezing.    Cardiovascular:  Positive for palpitations and leg swelling. Negative for chest pain.   Gastrointestinal:  Positive for abdominal pain. Negative for blood in stool, constipation, diarrhea, nausea and vomiting.   Musculoskeletal:  Positive for arthralgias, back pain and gait problem.   Skin:  Negative for wound.   Neurological:  Positive for weakness. Negative for seizures and syncope.   Objective:     Vital Signs (Most Recent):  Temp: 99.4 °F (37.4 °C) (06/03/22 1135)  Pulse: 81 (06/03/22 1135)  Resp: 20 (06/03/22 1205)  BP: 106/64 (06/03/22 1228)  SpO2: (!) 93 % (06/03/22 1135) Vital Signs (24h Range):  Temp:  [96.2 °F (35.7 °C)-99.4 °F (37.4 °C)] 99.4 °F (37.4 °C)  Pulse:  [73-84] 81  Resp:  [17-20] 20  SpO2:  [92 %-96 %] 93 %  BP: (106-118)/(63-81) 106/64     Weight: 136.1 kg (300 lb)  Body mass  index is 51.49 kg/m².    Physical Exam  Constitutional:       Appearance: She is obese. She is ill-appearing.   HENT:      Head: Normocephalic.      Nose: Nose normal.      Mouth/Throat:      Mouth: Mucous membranes are moist.   Eyes:      Extraocular Movements: Extraocular movements intact.      Pupils: Pupils are equal, round, and reactive to light.   Cardiovascular:      Rate and Rhythm: Normal rate and regular rhythm.   Pulmonary:      Effort: Pulmonary effort is normal.      Breath sounds: Normal breath sounds.   Abdominal:      General: Bowel sounds are normal. There is no distension.      Palpations: Abdomen is soft.      Tenderness: There is abdominal tenderness.   Musculoskeletal:      Cervical back: Normal range of motion.   Skin:     General: Skin is warm and dry.      Comments: 1+ pedal pulse on left; 2 + pedal pulse on right   Neurological:      Mental Status: She is alert and oriented to person, place, and time.         CRANIAL NERVES     CN III, IV, VI   Pupils are equal, round, and reactive to light.     Significant Labs: All pertinent labs within the past 24 hours have been reviewed.  Recent Lab Results         06/03/22  0837        Albumin 2.3       Alkaline Phosphatase 100       ALT 18       Anion Gap 5       AST 27       Baso # 0.06       Basophil % 0.7       BILIRUBIN TOTAL 1.1  Comment: For infants and newborns, interpretation of results should be based  on gestational age, weight and in agreement with clinical  observations.    Premature Infant recommended reference ranges:  Up to 24 hours.............<8.0 mg/dL  Up to 48 hours............<12.0 mg/dL  3-5 days..................<15.0 mg/dL  6-29 days.................<15.0 mg/dL    For patients on Eltrombopag therapy, use of Dimension [place] TBIL is   not   recommended.         BUN 16       Calcium 7.9       Chloride 100       CO2 30       Creatinine 1.1       CRP 14.10       Differential Method Automated       eGFR if  59.2        eGFR if non  51.3  Comment: Calculation used to obtain the estimated glomerular filtration  rate (eGFR) is the CKD-EPI equation.          Eos # 0.2       Eosinophil % 2.2       Glucose 119       Gran # (ANC) 5.7       Gran % 62.9       Hematocrit 34.1       Hemoglobin 11.4       Immature Grans (Abs) 0.03  Comment: Mild elevation in immature granulocytes is non specific and   can be seen in a variety of conditions including stress response,   acute inflammation, trauma and pregnancy. Correlation with other   laboratory and clinical findings is essential.         Immature Granulocytes 0.3       Lymph # 1.9       Lymph % 20.9       Magnesium 2.1       MCH 31.5       MCHC 33.4       MCV 94       Mono # 1.2       Mono % 13.0       MPV 11.0       nRBC 0       Platelets 235       Potassium 3.3       PROTEIN TOTAL 7.2       RBC 3.62       RDW 14.1       Sed Rate 93       Sodium 135       WBC 9.09               Significant Imaging: I have reviewed all pertinent imaging results/findings within the past 24 hours.

## 2022-06-03 NOTE — PROGRESS NOTES
Pharmacokinetic Assessment Follow Up: IV Vancomycin    Vancomycin serum concentration assessment(s):    The trough level was drawn correctly and can be used to guide therapy at this time. The measurement is above the desired definitive target range of 15 to 20 mcg/mL.    Vancomycin Regimen Plan:    Re-dose when the random level is less than 20 mcg/mL, next level to be drawn at 0430 with AM labs on 6/4/22    Drug levels (last 3 results):  Recent Labs   Lab Result Units 06/03/22  1639   Vancomycin-Trough ug/mL 31.5*       Pharmacy will continue to follow and monitor vancomycin.    Please contact pharmacy at extension [phone] for questions regarding this assessment.    Thank you for the consult,   JOSÉ MIGUEL TURNER       Patient brief summary:  Aurelia Ruggiero is a 67 y.o. female initiated on antimicrobial therapy with IV Vancomycin for treatment of bacteremia    The patient's current regimen is placed on hold.     Drug Allergies:   Review of patient's allergies indicates:   Allergen Reactions    Fish containing products Itching    Shellfish containing products Itching    Latex, natural rubber Hives       Actual Body Weight:   136.1kg     Renal Function:   Estimated Creatinine Clearance: 66.5 mL/min (based on SCr of 1.1 mg/dL).,     Dialysis Method (if applicable):  N/A    CBC (last 72 hours):  Recent Labs   Lab Result Units 06/01/22  1002 06/02/22  0414 06/03/22  0837   WBC K/uL 10.37 9.93 9.09   Hemoglobin g/dL 12.5 11.5* 11.4*   Hematocrit % 36.8* 34.6* 34.1*   Platelets K/uL 213 227 235   Gran % % 76.7*  --  62.9   Lymph % % 13.1*  --  20.9   Mono % % 8.8  --  13.0   Eosinophil % % 0.3  --  2.2   Basophil % % 0.7  --  0.7   Differential Method  Automated  --  Automated       Metabolic Panel (last 72 hours):  Recent Labs   Lab Result Units 06/01/22  1002 06/01/22  1433 06/02/22  0414 06/03/22  0837   Sodium mmol/L 139  --  138 135*   Potassium mmol/L 3.9  --  3.4* 3.3*   Chloride mmol/L 101  --  100 100   CO2 mmol/L 25  --   29 30*   Glucose mg/dL 113*  --  110 119*   Glucose, UA   --  Negative  --   --    BUN mg/dL 17  --  18 16   Creatinine mg/dL 1.0  --  1.1 1.1   Albumin g/dL 2.6*  --  2.7* 2.3*   Total Bilirubin mg/dL 1.6*  --  1.7* 1.1*   Alkaline Phosphatase U/L 99  --  98 100   AST U/L 30  --  25 27   ALT U/L 23  --  19 18   Magnesium mg/dL  --   --   --  2.1       Vancomycin Administrations:  vancomycin given in the last 96 hours                     vancomycin (VANCOCIN) 1,750 mg in dextrose 5 % 500 mL IVPB (mg) 1,750 mg New Bag 06/03/22 0600     1,750 mg New Bag 06/02/22 1800    vancomycin (VANCOCIN) 2,500 mg in dextrose 5 % 500 mL IVPB (mg) 2,500 mg New Bag 06/02/22 0751                    Microbiologic Results:  Microbiology Results (last 7 days)       Procedure Component Value Units Date/Time    Blood culture x two cultures. Draw prior to antibiotics. [[id number]]  (Abnormal) Collected: 06/01/22 1019    Order Status: Completed Specimen: Blood Updated: 06/03/22 1133     Blood Culture, Routine Gram stain aer bottle: Gram positive cocci in chains resembling Strep       Gram stain nita bottle: Gram positive cocci in chains resembling Strep       Positive results previously called 06/02/2022  05:14      ENTEROCOCCUS FAECALIS  For susceptibility see order # [id number]      Narrative:      Aerobic and anaerobic    Blood culture x two cultures. Draw prior to antibiotics. [[id number]]  (Abnormal) Collected: 06/01/22 1002    Order Status: Completed Specimen: Blood Updated: 06/03/22 1132     Blood Culture, Routine Gram stain nita bottle: Gram positive cocci in chains resembling Strep       Results called to and read back by: Karen Davis RN 06/02/2022        03:36      Gram stain aer bottle: Gram positive cocci in chains resembling Strep       Positive results previously called 06/02/2022  05:15      ENTEROCOCCUS FAECALIS  Susceptibility pending      Narrative:      Aerobic and anaerobic

## 2022-06-03 NOTE — RESPIRATORY THERAPY
06/03/22 0725   Patient Assessment/Suction   Level of Consciousness (AVPU) alert   Respiratory Effort Unlabored;Normal   Expansion/Accessory Muscles/Retractions no use of accessory muscles;no retractions   HERMAN Breath Sounds clear   Rhythm/Pattern, Respiratory unlabored;pattern regular   Cough Frequency no cough   PRE-TX-O2   O2 Device (Oxygen Therapy) room air   $ Is the patient on Low Flow Oxygen? Yes   SpO2 (!) 94 %   Pulse Oximetry Type Intermittent   $ Pulse Oximetry - Multiple Charge Pulse Oximetry - Multiple   Pulse 77   Resp 18   Positioning HOB elevated 30 degrees   Education   $ Education 15 min   Respiratory Evaluation   $ Care Plan Tech Time 15 min     Patient weaned from O2 at this time.  Eulalia SANCHEZ notified.

## 2022-06-03 NOTE — PROGRESS NOTES
[facility] Surg  Cardiology  Progress Note    Patient Name: Aurelia Ruggiero  MRN: [id number]  Admission Date: 6/1/2022  Hospital Length of Stay: 2 days  Code Status: Full Code   Attending Physician: Marimar Kellogg MD   Primary Care Physician: Marimar Kellogg MD  Expected Discharge Date:   Principal Problem:Sepsis    Subjective:     Brief HPI:     66 y/o F with SigHx of morbid obesity, AF on eliquis, severe AS, and BL carotid disease admitted to medicine for acute heart failure.   Pt has no known hx of CHF. Last echo in January reveals EF 68% with moderate to severe AS and PAP 37mmHg.   Pt came to ED for complaints of low back pain. On arrival Pt sats were 89% on RA. She was also noted to be AF RVR, however, pt states she did not take her medications prior to arrival.  CT was done to rule out PE and revealed no evidence of PE Multifocal scattered ground-glass lung opacities greatest within the right upper lobe suspicious for atypical pneumonia or edema.   On my exam, She denies edema. Denies Cp, denies SOB. She is in AF with CVR. Anticoagulated on eliquis.      Interval History:   BCx positive strept cocci x 2  Remains hypokalemic      Review of Systems:  Review of Systems   Musculoskeletal: Positive for back pain.   All other systems reviewed and are negative.      Social History:  Social History     Tobacco Use    Smoking status: Never Smoker    Smokeless tobacco: Never Used   Substance Use Topics    Alcohol use: Never       Objective:     Vital Signs (Most Recent):  Temp: 98.8 °F (37.1 °C) (06/03/22 0740)  Pulse: 84 (06/03/22 0810)  Resp: 20 (06/03/22 0740)  BP: 115/67 (06/03/22 0810)  SpO2: (!) 92 % (06/03/22 0740) Vital Signs (24h Range):  Temp:  [96.2 °F (35.7 °C)-98.9 °F (37.2 °C)] 98.8 °F (37.1 °C)  Pulse:  [73-92] 84  Resp:  [17-20] 20  SpO2:  [92 %-96 %] 92 %  BP: (108-118)/(63-81) 115/67     Weight: 136.1 kg (300 lb)  Body mass index is 51.49 kg/m².    SpO2: (!) 92 %  O2 Device (Oxygen Therapy):  room air      Intake/Output Summary (Last 24 hours) at 6/3/2022 1108  Last data filed at 6/3/2022 0700  Gross per 24 hour   Intake 1120 ml   Output 2350 ml   Net -1230 ml       Lines/Drains/Airways     Drain  Duration           Female External Urinary Catheter 06/01/22 2 days          Peripheral Intravenous Line  Duration                Peripheral IV - Single Lumen -- days         Midline Catheter Insertion/Assessment  - Single Lumen 06/02/22 0347 Left cephalic vein (lateral side of arm) 20g x 10cm 1 day                VTE Risk Mitigation (From admission, onward)         Ordered     apixaban tablet 5 mg  2 times daily         06/02/22 1742     IP VTE HIGH RISK PATIENT  Once         06/01/22 1440     Place sequential compression device  Until discontinued         06/01/22 1440                Significant Labs:   Recent Lab Results       06/03/22  0837        Albumin 2.3       Alkaline Phosphatase 100       ALT 18       Anion Gap 5       AST 27       Baso # 0.06       Basophil % 0.7       BILIRUBIN TOTAL 1.1  Comment: For infants and newborns, interpretation of results should be based  on gestational age, weight and in agreement with clinical  observations.    Premature Infant recommended reference ranges:  Up to 24 hours.............<8.0 mg/dL  Up to 48 hours............<12.0 mg/dL  3-5 days..................<15.0 mg/dL  6-29 days.................<15.0 mg/dL    For patients on Eltrombopag therapy, use of Dimension [place] TBIL is   not   recommended.         BUN 16       Calcium 7.9       Chloride 100       CO2 30       Creatinine 1.1       CRP 14.10       Differential Method Automated       eGFR if African American 59.2       eGFR if non  51.3  Comment: Calculation used to obtain the estimated glomerular filtration  rate (eGFR) is the CKD-EPI equation.          Eos # 0.2       Eosinophil % 2.2       Glucose 119       Gran # (ANC) 5.7       Gran % 62.9       Hematocrit 34.1       Hemoglobin 11.4        Immature Grans (Abs) 0.03  Comment: Mild elevation in immature granulocytes is non specific and   can be seen in a variety of conditions including stress response,   acute inflammation, trauma and pregnancy. Correlation with other   laboratory and clinical findings is essential.         Immature Granulocytes 0.3       Lymph # 1.9       Lymph % 20.9       Magnesium 2.1       MCH 31.5       MCHC 33.4       MCV 94       Mono # 1.2       Mono % 13.0       MPV 11.0       nRBC 0       Platelets 235       Potassium 3.3       PROTEIN TOTAL 7.2       RBC 3.62       RDW 14.1       Sed Rate 93       Sodium 135       WBC 9.09                   Telemetry:  AF      Physical Exam    Assessment:     Active Diagnoses:    Diagnosis Date Noted POA    PRINCIPAL PROBLEM:  Sepsis [A41.9] 06/02/2022 Yes    Acute pulmonary edema [J81.0] 06/02/2022 Yes    Lumbar disc disease with radiculopathy [M51.16] 06/02/2022 Yes    Longstanding persistent atrial fibrillation [I48.11] 06/02/2022 Yes    Abdominal pain [R10.9] 06/02/2022 Yes      Problems Resolved During this Admission:    Diagnosis Date Noted Date Resolved POA    Back pain [M54.9] 06/02/2022 06/02/2022 Yes       Home Medications:  No current facility-administered medications on file prior to encounter.     Current Outpatient Medications on File Prior to Encounter   Medication Sig Dispense Refill    ALPRAZolam (XANAX) 0.5 MG tablet Take 1-2 tablets by mouth every 6hrs as needed for anxiety 90 tablet 1    amiodarone (PACERONE) 100 MG Tab Take 1 tablet (100 mg total) by mouth once daily. 30 tablet 0    ascorbic acid, vitamin C, (VITAMIN C) 1000 MG tablet Take 1,000 mg by mouth once daily.      b complex vitamins tablet Take 1 tablet by mouth once daily.      cyclobenzaprine (FLEXERIL) 5 MG tablet Take 1 tablet (5 mg total) by mouth 3 (three) times daily as needed for Muscle spasms. 40 tablet 0    ferrous sulfate (FEOSOL) 325 mg (65 mg iron) Tab tablet Take 325 mg by mouth daily  with breakfast.      furosemide (LASIX) 20 MG tablet Take 1 tablet (20 mg total) by mouth once daily. 30 tablet 2    gabapentin (NEURONTIN) 300 MG capsule Take 300 mg by mouth every evening.      HYDROcodone-acetaminophen (NORCO) 7.5-325 mg per tablet Take 1 tablet by mouth every 6 (six) hours as needed for Pain. 100 tablet 0    levothyroxine (SYNTHROID) 150 MCG tablet Take 150 mcg by mouth before breakfast.      metoprolol tartrate (LOPRESSOR) 50 MG tablet Take 50 mg by mouth 2 (two) times daily.      multivitamin (THERAGRAN) per tablet Take 1 tablet by mouth once daily.      pantoprazole (PROTONIX) 40 MG tablet Take 1 tablet (40 mg total) by mouth 2 (two) times daily. 60 tablet 0    traMADoL (ULTRAM) 50 mg tablet Take 1 tablet (50 mg total) by mouth every 6 (six) hours as needed for Pain. 12 tablet 0    vitamin D (VITAMIN D3) 1000 units Tab Take 1,000 Units by mouth 2 (two) times a day.         Current Inpatient Medications:    Current Facility-Administered Medications:     acetaminophen tablet 650 mg, 650 mg, Oral, Q8H PRN, Steven Melo Jr., MD    acetaminophen tablet 650 mg, 650 mg, Oral, Q8H PRN, Steven Melo Jr., MD    ALPRAZolam tablet 0.5 mg, 0.5 mg, Oral, BID PRN, Steven Melo Jr., MD, 0.5 mg at 06/02/22 2019    amiodarone tablet 100 mg, 100 mg, Oral, Daily, Steven Melo Jr., MD, 100 mg at 06/03/22 0810    apixaban tablet 5 mg, 5 mg, Oral, BID, Marimar Kellogg MD, 5 mg at 06/03/22 0811    baclofen tablet 10 mg, 10 mg, Oral, TID PRN, Tutu Alvarado III, MD, 10 mg at 06/02/22 2019    cefTRIAXone (ROCEPHIN) 1 g/50 mL D5W IVPB, 1 g, Intravenous, Q12H, Tutu Alvarado III, MD, Stopped at 06/03/22 0527    gabapentin capsule 300 mg, 300 mg, Oral, QHS, Steven Melo Jr., MD, 300 mg at 06/02/22 2006    HYDROmorphone injection 1 mg, 1 mg, Intravenous, Q3H PRN, Tutu Alvarado III, MD, 1 mg at 06/03/22 0609    levothyroxine tablet 150 mcg, 150 mcg, Oral, Before breakfast, Steven  Lorie Tong MD, 150 mcg at 06/03/22 0500    melatonin tablet 6 mg, 6 mg, Oral, Nightly PRN, Steven Melo Jr., MD    metoprolol tartrate (LOPRESSOR) tablet 50 mg, 50 mg, Oral, BID, Steven Melo Jr., MD, 50 mg at 06/03/22 0810    ondansetron injection 4 mg, 4 mg, Intravenous, Q8H PRN, Steven Melo Jr., MD    pantoprazole EC tablet 40 mg, 40 mg, Oral, BID, Steven Melo Jr., MD, 40 mg at 06/03/22 0811    sodium chloride 0.9% flush 10 mL, 10 mL, Intravenous, PRN, Steven Melo Jr., MD, 10 mL at 06/02/22 0044    traMADoL tablet 50 mg, 50 mg, Oral, Q6H PRN, Steven Melo Jr., MD    vancomycin (VANCOCIN) 1,750 mg in dextrose 5 % 500 mL IVPB, 1,750 mg, Intravenous, Q12H, Marimar Kellogg MD, Stopped at 06/03/22 0800    Pharmacy to dose Vancomycin consult, , , Once **AND** vancomycin - pharmacy to dose, , Intravenous, pharmacy to manage frequency, Tutu Alvarado III, MD    VTE Risk Mitigation (From admission, onward)         Ordered     apixaban tablet 5 mg  2 times daily         06/02/22 1742     IP VTE HIGH RISK PATIENT  Once         06/01/22 1440     Place sequential compression device  Until discontinued         06/01/22 1440                    Plan:     Lumbar disc radiculopathy  -defer tx to PCP  Renal Cyst  Acute Pulmonary edema  -Pt has diuresed >3L since admit. Recommend de escalation to PO diuretic (Lasix 40mg PO daily) as we do not want to overdiurese in setting of moderate to severe AS.  -add aldactone  Atrial fibrillation  -Continue BB and amiodarone. Eliquis for anticoagulation  Moderate to Severe AS  -extreme caution with Diuresis   Hypokalemia  -Replete for goal K >4.0   Bacteremia  -ALEX            Alena Latif NP  Cardiology  [facility]  06/03/2022

## 2022-06-03 NOTE — NURSING
1720  NOTIFIED BY AMPARO IN LAB THAT PATIENT'S VANC. TROUGH WAS 31.5 HIGH.  THIS NURSE NOTIFIED [facility] PHARMACIST THAT PATIENT'S VANC. TROUGH WAS CRITICALLY HIGH.  SHE WILL ADJUST DOSAGE.   MEAGHAN HERRERA LPN

## 2022-06-03 NOTE — PROGRESS NOTES
[facility] Medicine  Progress Note    Patient Name: Aurelia Ruggiero  MRN: [id number]  Patient Class: IP- Inpatient   Admission Date: 6/1/2022  Length of Stay: 2 days  Attending Physician: Marimar Kellogg MD  Primary Care Provider: Marimar Kellogg MD        Subjective:     Principal Problem:Sepsis        HPI:  67-year-old female presents to the ED via EMS with reports of worsening lower back pain.  Patient denies any falls or trauma reports that pain starts and left lower back and radiates down to left leg.  Patient reports she has a history of arthritis but denies any surgeries or procedures on back.  EMS reports patient was noted to be O2 sat on room air of 88%.  Patient has a history of AFib but reports not taking medications this morning.  Patient's PCP is Dr. Kellogg       Overview/Hospital Course:  6/3 ND pt more peppy this am - still havign lower back and side pain.   Informed pt and son of her sepsis dx and need for iv abxs.  Breathing is better per pt      Past Medical History:   Diagnosis Date    Adult hypothyroidism     Anxiety     Atherosclerosis of both carotid arteries     Atrial fibrillation     Atrial fibrillation     CHF (congestive heart failure)     Moderate aortic valve stenosis        Past Surgical History:   Procedure Laterality Date    APPENDECTOMY      COLONOSCOPY N/A 1/28/2022    Procedure: COLONOSCOPY;  Surgeon: Jl Terrazas MD;  Location: [facility];  Service: General;  Laterality: N/A;    KNEE SURGERY      TONSILLECTOMY      TREATMENT OF CARDIAC ARRHYTHMIA N/A 10/27/2020    Procedure: Cardioversion or Defibrillation;  Surgeon: Gaivn Duran MD;  [facility];  Service: Cardiovascular;  Laterality: N/A;  APPROVED PER POORNIMA 10/6       Review of patient's allergies indicates:   Allergen Reactions    Fish containing products Itching    Shellfish containing products Itching    Latex, natural rubber Hives       No current facility-administered  medications on file prior to encounter.     Current Outpatient Medications on File Prior to Encounter   Medication Sig    ALPRAZolam (XANAX) 0.5 MG tablet Take 1-2 tablets by mouth every 6hrs as needed for anxiety    amiodarone (PACERONE) 100 MG Tab Take 1 tablet (100 mg total) by mouth once daily.    ascorbic acid, vitamin C, (VITAMIN C) 1000 MG tablet Take 1,000 mg by mouth once daily.    b complex vitamins tablet Take 1 tablet by mouth once daily.    cyclobenzaprine (FLEXERIL) 5 MG tablet Take 1 tablet (5 mg total) by mouth 3 (three) times daily as needed for Muscle spasms.    ferrous sulfate (FEOSOL) 325 mg (65 mg iron) Tab tablet Take 325 mg by mouth daily with breakfast.    furosemide (LASIX) 20 MG tablet Take 1 tablet (20 mg total) by mouth once daily.    gabapentin (NEURONTIN) 300 MG capsule Take 300 mg by mouth every evening.    HYDROcodone-acetaminophen (NORCO) 7.5-325 mg per tablet Take 1 tablet by mouth every 6 (six) hours as needed for Pain.    levothyroxine (SYNTHROID) 150 MCG tablet Take 150 mcg by mouth before breakfast.    metoprolol tartrate (LOPRESSOR) 50 MG tablet Take 50 mg by mouth 2 (two) times daily.    multivitamin (THERAGRAN) per tablet Take 1 tablet by mouth once daily.    pantoprazole (PROTONIX) 40 MG tablet Take 1 tablet (40 mg total) by mouth 2 (two) times daily.    traMADoL (ULTRAM) 50 mg tablet Take 1 tablet (50 mg total) by mouth every 6 (six) hours as needed for Pain.    vitamin D (VITAMIN D3) 1000 units Tab Take 1,000 Units by mouth 2 (two) times a day.     Family History       Problem Relation (Age of Onset)    Atrial fibrillation Sister    Cancer Brother    Heart failure Father    Hypertension Sister, Brother          Tobacco Use    Smoking status: Never Smoker    Smokeless tobacco: Never Used   Substance and Sexual Activity    Alcohol use: Never    Drug use: Never    Sexual activity: Not on file     Review of Systems   Constitutional:  Positive for activity  change, chills and fatigue. Negative for fever.   HENT:  Negative for sinus pressure and sinus pain.    Respiratory:  Positive for cough and shortness of breath. Negative for wheezing.    Cardiovascular:  Positive for palpitations and leg swelling. Negative for chest pain.   Gastrointestinal:  Positive for abdominal pain. Negative for blood in stool, constipation, diarrhea, nausea and vomiting.   Musculoskeletal:  Positive for arthralgias, back pain and gait problem.   Skin:  Negative for wound.   Neurological:  Positive for weakness. Negative for seizures and syncope.   Objective:     Vital Signs (Most Recent):  Temp: 99.4 °F (37.4 °C) (06/03/22 1135)  Pulse: 81 (06/03/22 1135)  Resp: 20 (06/03/22 1205)  BP: 106/64 (06/03/22 1228)  SpO2: (!) 93 % (06/03/22 1135) Vital Signs (24h Range):  Temp:  [96.2 °F (35.7 °C)-99.4 °F (37.4 °C)] 99.4 °F (37.4 °C)  Pulse:  [73-84] 81  Resp:  [17-20] 20  SpO2:  [92 %-96 %] 93 %  BP: (106-118)/(63-81) 106/64     Weight: 136.1 kg (300 lb)  Body mass index is 51.49 kg/m².    Physical Exam  Constitutional:       Appearance: She is obese. She is ill-appearing.   HENT:      Head: Normocephalic.      Nose: Nose normal.      Mouth/Throat:      Mouth: Mucous membranes are moist.   Eyes:      Extraocular Movements: Extraocular movements intact.      Pupils: Pupils are equal, round, and reactive to light.   Cardiovascular:      Rate and Rhythm: Normal rate and regular rhythm.   Pulmonary:      Effort: Pulmonary effort is normal.      Breath sounds: Normal breath sounds.   Abdominal:      General: Bowel sounds are normal. There is no distension.      Palpations: Abdomen is soft.      Tenderness: There is abdominal tenderness.   Musculoskeletal:      Cervical back: Normal range of motion.   Skin:     General: Skin is warm and dry.      Comments: 1+ pedal pulse on left; 2 + pedal pulse on right   Neurological:      Mental Status: She is alert and oriented to person, place, and time.          CRANIAL NERVES     CN III, IV, VI   Pupils are equal, round, and reactive to light.     Significant Labs: All pertinent labs within the past 24 hours have been reviewed.  Recent Lab Results         06/03/22  0837        Albumin 2.3       Alkaline Phosphatase 100       ALT 18       Anion Gap 5       AST 27       Baso # 0.06       Basophil % 0.7       BILIRUBIN TOTAL 1.1  Comment: For infants and newborns, interpretation of results should be based  on gestational age, weight and in agreement with clinical  observations.    Premature Infant recommended reference ranges:  Up to 24 hours.............<8.0 mg/dL  Up to 48 hours............<12.0 mg/dL  3-5 days..................<15.0 mg/dL  6-29 days.................<15.0 mg/dL    For patients on Eltrombopag therapy, use of Dimension [place] TBIL is   not   recommended.         BUN 16       Calcium 7.9       Chloride 100       CO2 30       Creatinine 1.1       CRP 14.10       Differential Method Automated       eGFR if African American 59.2       eGFR if non  51.3  Comment: Calculation used to obtain the estimated glomerular filtration  rate (eGFR) is the CKD-EPI equation.          Eos # 0.2       Eosinophil % 2.2       Glucose 119       Gran # (ANC) 5.7       Gran % 62.9       Hematocrit 34.1       Hemoglobin 11.4       Immature Grans (Abs) 0.03  Comment: Mild elevation in immature granulocytes is non specific and   can be seen in a variety of conditions including stress response,   acute inflammation, trauma and pregnancy. Correlation with other   laboratory and clinical findings is essential.         Immature Granulocytes 0.3       Lymph # 1.9       Lymph % 20.9       Magnesium 2.1       MCH 31.5       MCHC 33.4       MCV 94       Mono # 1.2       Mono % 13.0       MPV 11.0       nRBC 0       Platelets 235       Potassium 3.3       PROTEIN TOTAL 7.2       RBC 3.62       RDW 14.1       Sed Rate 93       Sodium 135       WBC 9.09          "      Significant Imaging: I have reviewed all pertinent imaging results/findings within the past 24 hours.      Assessment/Plan:      * Sepsis  This patient does have evidence of infective focus  My overall impression is sepsis. Vital signs were reviewed and noted in progress note.  Antibiotics given-   Antibiotics (From admission, onward)            Start     Stop Route Frequency Ordered    06/02/22 1800  vancomycin (VANCOCIN) 1,750 mg in dextrose 5 % 500 mL IVPB         -- IV Every 12 hours (non-standard times) 06/02/22 0447    06/02/22 0537  vancomycin - pharmacy to dose  (vancomycin IVPB)        "And" Linked Group Details    -- IV pharmacy to manage frequency 06/02/22 0437    06/02/22 0500  cefTRIAXone (ROCEPHIN) 1 g/50 mL D5W IVPB         06/09 0459 IV Every 12 hours (non-standard times) 06/02/22 0437        Cultures were taken-   Microbiology Results (last 7 days)     Procedure Component Value Units Date/Time    Blood culture x two cultures. Draw prior to antibiotics. [[id number]]  (Abnormal) Collected: 06/01/22 1019    Order Status: Completed Specimen: Blood Updated: 06/03/22 1133     Blood Culture, Routine Gram stain aer bottle: Gram positive cocci in chains resembling Strep       Gram stain nita bottle: Gram positive cocci in chains resembling Strep       Positive results previously called 06/02/2022  05:14      ENTEROCOCCUS FAECALIS  For susceptibility see order # [id number]      Narrative:      Aerobic and anaerobic    Blood culture x two cultures. Draw prior to antibiotics. [[id number]]  (Abnormal) Collected: 06/01/22 1002    Order Status: Completed Specimen: Blood Updated: 06/03/22 1132     Blood Culture, Routine Gram stain nita bottle: Gram positive cocci in chains resembling Strep       Results called to and read back by: Karen Davis RN 06/02/2022        03:36      Gram stain aer bottle: Gram positive cocci in chains resembling Strep       Positive results previously called 06/02/2022  05:15      " ENTEROCOCCUS FAECALIS  Susceptibility pending      Narrative:      Aerobic and anaerobic        Latest lactate reviewed, they are-  Recent Labs   Lab 06/01/22  1019   LACTATE 1.4       Organ dysfunction indicated by Acute respiratory failure  Source- uti, bacteremia    Source control Achieved by- iv abxs  6/3 cont iv abxs- follow sensitivites    Myopathy  Pt/ot consult      Hypokalemia  kdur qd - monitor levels      Abdominal pain  Ct of abd/pelvis - was negative      Longstanding persistent atrial fibrillation  Patient with Persistent (7 days or more) atrial fibrillation which is controlled currently with Beta Blocker and Amiodarone. Patient is currently in atrial fibrillation.[id number] Score: 2. Anticoagulation indicated. Anticoagulation done with eliquis.  Cards helpign due to recent rvr afib and recent pulm edema      Lumbar disc disease with radiculopathy  Pt with severe back pain - ct of back shows no fractures but severe degenerative dz      Acute pulmonary edema  Resolved =- cards chagned to aldactone now        VTE Risk Mitigation (From admission, onward)         Ordered     apixaban tablet 5 mg  2 times daily         06/02/22 1742     IP VTE HIGH RISK PATIENT  Once         06/01/22 1440     Place sequential compression device  Until discontinued         06/01/22 1440                Discharge Planning   PARISH:      Code Status: Full Code   Is the patient medically ready for discharge?:     Reason for patient still in hospital (select all that apply): Treatment  Discharge Plan A: Home, Home Health                  Marimar Kellogg MD  Department of Hospital Medicine   [facility] Surg

## 2022-06-03 NOTE — ASSESSMENT & PLAN NOTE
Patient with Persistent (7 days or more) atrial fibrillation which is controlled currently with Beta Blocker and Amiodarone. Patient is currently in atrial fibrillation.[id number] Score: 2. Anticoagulation indicated. Anticoagulation done with eliquis.  Cards helpign due to recent rvr afib and recent pulm edema

## 2022-06-04 LAB
ALBUMIN SERPL BCP-MCNC: 2.3 G/DL (ref 3.5–5.2)
ALP SERPL-CCNC: 109 U/L (ref 55–135)
ALT SERPL W/O P-5'-P-CCNC: 19 U/L (ref 10–44)
ANION GAP SERPL CALC-SCNC: 7 MMOL/L (ref 8–16)
AST SERPL-CCNC: 25 U/L (ref 10–40)
BACTERIA BLD CULT: ABNORMAL
BASOPHILS # BLD AUTO: 0.09 K/UL (ref 0–0.2)
BASOPHILS NFR BLD: 1 % (ref 0–1.9)
BILIRUB SERPL-MCNC: 1.1 MG/DL (ref 0.1–1)
BUN SERPL-MCNC: 16 MG/DL (ref 8–23)
CALCIUM SERPL-MCNC: 8.2 MG/DL (ref 8.7–10.5)
CHLORIDE SERPL-SCNC: 101 MMOL/L (ref 95–110)
CO2 SERPL-SCNC: 30 MMOL/L (ref 23–29)
CREAT SERPL-MCNC: 0.9 MG/DL (ref 0.5–1.4)
DIFFERENTIAL METHOD: ABNORMAL
EOSINOPHIL # BLD AUTO: 0.3 K/UL (ref 0–0.5)
EOSINOPHIL NFR BLD: 2.9 % (ref 0–8)
ERYTHROCYTE [DISTWIDTH] IN BLOOD BY AUTOMATED COUNT: 14.1 % (ref 11.5–14.5)
EST. GFR  (AFRICAN AMERICAN): >60 ML/MIN/1.73 M^2
EST. GFR  (NON AFRICAN AMERICAN): >60 ML/MIN/1.73 M^2
GLUCOSE SERPL-MCNC: 100 MG/DL (ref 70–110)
HCT VFR BLD AUTO: 33.4 % (ref 37–48.5)
HGB BLD-MCNC: 11.2 G/DL (ref 12–16)
IMM GRANULOCYTES # BLD AUTO: 0.03 K/UL (ref 0–0.04)
IMM GRANULOCYTES NFR BLD AUTO: 0.3 % (ref 0–0.5)
LYMPHOCYTES # BLD AUTO: 2.1 K/UL (ref 1–4.8)
LYMPHOCYTES NFR BLD: 22.4 % (ref 18–48)
MAGNESIUM SERPL-MCNC: 2.2 MG/DL (ref 1.6–2.6)
MCH RBC QN AUTO: 31.5 PG (ref 27–31)
MCHC RBC AUTO-ENTMCNC: 33.5 G/DL (ref 32–36)
MCV RBC AUTO: 94 FL (ref 82–98)
MONOCYTES # BLD AUTO: 1.3 K/UL (ref 0.3–1)
MONOCYTES NFR BLD: 13.3 % (ref 4–15)
NEUTROPHILS # BLD AUTO: 5.7 K/UL (ref 1.8–7.7)
NEUTROPHILS NFR BLD: 60.1 % (ref 38–73)
NRBC BLD-RTO: 0 /100 WBC
PLATELET # BLD AUTO: 228 K/UL (ref 150–450)
PMV BLD AUTO: 11.3 FL (ref 9.2–12.9)
POTASSIUM SERPL-SCNC: 4 MMOL/L (ref 3.5–5.1)
PROT SERPL-MCNC: 6.9 G/DL (ref 6–8.4)
RBC # BLD AUTO: 3.55 M/UL (ref 4–5.4)
SODIUM SERPL-SCNC: 138 MMOL/L (ref 136–145)
VANCOMYCIN SERPL-MCNC: 22.2 UG/ML
WBC # BLD AUTO: 9.46 K/UL (ref 3.9–12.7)

## 2022-06-04 PROCEDURE — 97530 THERAPEUTIC ACTIVITIES: CPT | Mod: CO

## 2022-06-04 PROCEDURE — 85025 COMPLETE CBC W/AUTO DIFF WBC: CPT | Performed by: INTERNAL MEDICINE

## 2022-06-04 PROCEDURE — 63600175 PHARM REV CODE 636 W HCPCS: Performed by: INTERNAL MEDICINE

## 2022-06-04 PROCEDURE — A4216 STERILE WATER/SALINE, 10 ML: HCPCS | Performed by: FAMILY MEDICINE

## 2022-06-04 PROCEDURE — 99900035 HC TECH TIME PER 15 MIN (STAT)

## 2022-06-04 PROCEDURE — 25000003 PHARM REV CODE 250: Performed by: NURSE PRACTITIONER

## 2022-06-04 PROCEDURE — 99900031 HC PATIENT EDUCATION (STAT)

## 2022-06-04 PROCEDURE — 94761 N-INVAS EAR/PLS OXIMETRY MLT: CPT

## 2022-06-04 PROCEDURE — 36415 COLL VENOUS BLD VENIPUNCTURE: CPT | Performed by: INTERNAL MEDICINE

## 2022-06-04 PROCEDURE — 11000001 HC ACUTE MED/SURG PRIVATE ROOM

## 2022-06-04 PROCEDURE — 97161 PT EVAL LOW COMPLEX 20 MIN: CPT

## 2022-06-04 PROCEDURE — 87040 BLOOD CULTURE FOR BACTERIA: CPT | Mod: 59 | Performed by: INTERNAL MEDICINE

## 2022-06-04 PROCEDURE — 83735 ASSAY OF MAGNESIUM: CPT | Performed by: INTERNAL MEDICINE

## 2022-06-04 PROCEDURE — 25000003 PHARM REV CODE 250: Performed by: INTERNAL MEDICINE

## 2022-06-04 PROCEDURE — 80202 ASSAY OF VANCOMYCIN: CPT | Performed by: INTERNAL MEDICINE

## 2022-06-04 PROCEDURE — 80053 COMPREHEN METABOLIC PANEL: CPT | Performed by: INTERNAL MEDICINE

## 2022-06-04 PROCEDURE — 25000003 PHARM REV CODE 250: Performed by: FAMILY MEDICINE

## 2022-06-04 RX ORDER — VANCOMYCIN HCL IN 5 % DEXTROSE 1G/250ML
1000 PLASTIC BAG, INJECTION (ML) INTRAVENOUS
Status: DISCONTINUED | OUTPATIENT
Start: 2022-06-04 | End: 2022-06-05

## 2022-06-04 RX ADMIN — VANCOMYCIN HYDROCHLORIDE 1000 MG: 1 INJECTION, POWDER, LYOPHILIZED, FOR SOLUTION INTRAVENOUS at 09:06

## 2022-06-04 RX ADMIN — Medication 10 ML: at 09:06

## 2022-06-04 RX ADMIN — METOPROLOL TARTRATE 50 MG: 50 TABLET, FILM COATED ORAL at 09:06

## 2022-06-04 RX ADMIN — POTASSIUM CHLORIDE 20 MEQ: 20 TABLET, EXTENDED RELEASE ORAL at 09:06

## 2022-06-04 RX ADMIN — GABAPENTIN 300 MG: 300 CAPSULE ORAL at 09:06

## 2022-06-04 RX ADMIN — CEFTRIAXONE SODIUM 1 G: 1 INJECTION, POWDER, FOR SOLUTION INTRAMUSCULAR; INTRAVENOUS at 05:06

## 2022-06-04 RX ADMIN — HYDROMORPHONE HYDROCHLORIDE 1 MG: 1 INJECTION, SOLUTION INTRAMUSCULAR; INTRAVENOUS; SUBCUTANEOUS at 09:06

## 2022-06-04 RX ADMIN — APIXABAN 5 MG: 5 TABLET, FILM COATED ORAL at 09:06

## 2022-06-04 RX ADMIN — LEVOTHYROXINE SODIUM 150 MCG: 150 TABLET ORAL at 06:06

## 2022-06-04 RX ADMIN — SPIRONOLACTONE 12.5 MG: 25 TABLET, FILM COATED ORAL at 09:06

## 2022-06-04 RX ADMIN — AMIODARONE HYDROCHLORIDE 100 MG: 100 TABLET ORAL at 09:06

## 2022-06-04 RX ADMIN — PANTOPRAZOLE SODIUM 40 MG: 40 TABLET, DELAYED RELEASE ORAL at 09:06

## 2022-06-04 RX ADMIN — ALPRAZOLAM 0.5 MG: 0.5 TABLET ORAL at 09:06

## 2022-06-04 RX ADMIN — CEFTRIAXONE SODIUM 1 G: 1 INJECTION, POWDER, FOR SOLUTION INTRAMUSCULAR; INTRAVENOUS at 06:06

## 2022-06-04 NOTE — SUBJECTIVE & OBJECTIVE
Past Medical History:   Diagnosis Date    Adult hypothyroidism     Anxiety     Atherosclerosis of both carotid arteries     Atrial fibrillation     Atrial fibrillation     CHF (congestive heart failure)     Moderate aortic valve stenosis        Past Surgical History:   Procedure Laterality Date    APPENDECTOMY      COLONOSCOPY N/A 1/28/2022    Procedure: COLONOSCOPY;  Surgeon: Jl Terrazas MD;  Location: [facility] ENDO;  Service: General;  Laterality: N/A;    KNEE SURGERY      TONSILLECTOMY      TREATMENT OF CARDIAC ARRHYTHMIA N/A 10/27/2020    Procedure: Cardioversion or Defibrillation;  Surgeon: Gavin Duran MD;  Location: [facility] CATH;  Service: Cardiovascular;  Laterality: N/A;  APPROVED PER POORNIMA 10/6       Review of patient's allergies indicates:   Allergen Reactions    Fish containing products Itching    Shellfish containing products Itching    Latex, natural rubber Hives       No current facility-administered medications on file prior to encounter.     Current Outpatient Medications on File Prior to Encounter   Medication Sig    ALPRAZolam (XANAX) 0.5 MG tablet Take 1-2 tablets by mouth every 6hrs as needed for anxiety    amiodarone (PACERONE) 100 MG Tab Take 1 tablet (100 mg total) by mouth once daily.    ascorbic acid, vitamin C, (VITAMIN C) 1000 MG tablet Take 1,000 mg by mouth once daily.    b complex vitamins tablet Take 1 tablet by mouth once daily.    cyclobenzaprine (FLEXERIL) 5 MG tablet Take 1 tablet (5 mg total) by mouth 3 (three) times daily as needed for Muscle spasms.    ferrous sulfate (FEOSOL) 325 mg (65 mg iron) Tab tablet Take 325 mg by mouth daily with breakfast.    furosemide (LASIX) 20 MG tablet Take 1 tablet (20 mg total) by mouth once daily.    gabapentin (NEURONTIN) 300 MG capsule Take 300 mg by mouth every evening.    HYDROcodone-acetaminophen (NORCO) 7.5-325 mg per tablet Take 1 tablet by mouth every 6 (six) hours as needed for Pain.    levothyroxine  (SYNTHROID) 150 MCG tablet Take 150 mcg by mouth before breakfast.    metoprolol tartrate (LOPRESSOR) 50 MG tablet Take 50 mg by mouth 2 (two) times daily.    multivitamin (THERAGRAN) per tablet Take 1 tablet by mouth once daily.    pantoprazole (PROTONIX) 40 MG tablet Take 1 tablet (40 mg total) by mouth 2 (two) times daily.    traMADoL (ULTRAM) 50 mg tablet Take 1 tablet (50 mg total) by mouth every 6 (six) hours as needed for Pain.    vitamin D (VITAMIN D3) 1000 units Tab Take 1,000 Units by mouth 2 (two) times a day.     Family History       Problem Relation (Age of Onset)    Atrial fibrillation Sister    Cancer Brother    Heart failure Father    Hypertension Sister, Brother          Tobacco Use    Smoking status: Never Smoker    Smokeless tobacco: Never Used   Substance and Sexual Activity    Alcohol use: Never    Drug use: Never    Sexual activity: Not on file     Review of Systems   Constitutional:  Positive for activity change, chills and fatigue. Negative for fever.   HENT:  Negative for sinus pressure and sinus pain.    Respiratory:  Positive for cough and shortness of breath. Negative for wheezing.    Cardiovascular:  Positive for palpitations and leg swelling. Negative for chest pain.   Gastrointestinal:  Positive for abdominal pain. Negative for blood in stool, constipation, diarrhea, nausea and vomiting.   Musculoskeletal:  Positive for arthralgias, back pain and gait problem.   Skin:  Negative for wound.   Neurological:  Positive for weakness. Negative for seizures and syncope.   Objective:     Vital Signs (Most Recent):  Temp: 98.2 °F (36.8 °C) (06/04/22 1126)  Pulse: 78 (06/04/22 1126)  Resp: 20 (06/04/22 1126)  BP: 105/60 (06/04/22 1126)  SpO2: (!) 90 % (06/04/22 1126) Vital Signs (24h Range):  Temp:  [98.2 °F (36.8 °C)-99.2 °F (37.3 °C)] 98.2 °F (36.8 °C)  Pulse:  [] 78  Resp:  [18-22] 20  SpO2:  [90 %-92 %] 90 %  BP: (101-133)/(60-87) 105/60     Weight: 136.1 kg (300 lb)  Body  mass index is 51.49 kg/m².    Physical Exam  Constitutional:       Appearance: She is obese. She is ill-appearing.   HENT:      Head: Normocephalic.      Nose: Nose normal.      Mouth/Throat:      Mouth: Mucous membranes are moist.   Eyes:      Extraocular Movements: Extraocular movements intact.      Pupils: Pupils are equal, round, and reactive to light.   Cardiovascular:      Rate and Rhythm: Normal rate and regular rhythm.   Pulmonary:      Effort: Pulmonary effort is normal.      Breath sounds: Normal breath sounds.   Abdominal:      General: Bowel sounds are normal. There is no distension.      Palpations: Abdomen is soft.      Tenderness: There is abdominal tenderness.   Musculoskeletal:      Cervical back: Normal range of motion.   Skin:     General: Skin is warm and dry.      Comments: 1+ pedal pulse on left; 2 + pedal pulse on right   Neurological:      Mental Status: She is alert and oriented to person, place, and time.         CRANIAL NERVES     CN III, IV, VI   Pupils are equal, round, and reactive to light.     Significant Labs: All pertinent labs within the past 24 hours have been reviewed.  Recent Lab Results         06/04/22  0355   06/03/22  1639        Albumin 2.3         Alkaline Phosphatase 109         ALT 19         Anion Gap 7         AST 25         Baso # 0.09         Basophil % 1.0         BILIRUBIN TOTAL 1.1  Comment: For infants and newborns, interpretation of results should be based  on gestational age, weight and in agreement with clinical  observations.    Premature Infant recommended reference ranges:  Up to 24 hours.............<8.0 mg/dL  Up to 48 hours............<12.0 mg/dL  3-5 days..................<15.0 mg/dL  6-29 days.................<15.0 mg/dL    For patients on Eltrombopag therapy, use of Dimension [place] TBIL is   not   recommended.           BUN 16         Calcium 8.2         Chloride 101         CO2 30         Creatinine 0.9         Differential Method Automated          eGFR if  >60.0         eGFR if non  >60.0  Comment: Calculation used to obtain the estimated glomerular filtration  rate (eGFR) is the CKD-EPI equation.            Eos # 0.3         Eosinophil % 2.9         Glucose 100         Gran # (ANC) 5.7         Gran % 60.1         Hematocrit 33.4         Hemoglobin 11.2         Immature Grans (Abs) 0.03  Comment: Mild elevation in immature granulocytes is non specific and   can be seen in a variety of conditions including stress response,   acute inflammation, trauma and pregnancy. Correlation with other   laboratory and clinical findings is essential.           Immature Granulocytes 0.3         Lymph # 2.1         Lymph % 22.4         Magnesium 2.2         MCH 31.5         MCHC 33.5         MCV 94         Mono # 1.3         Mono % 13.3         MPV 11.3         nRBC 0         Platelets 228         Potassium 4.0         PROTEIN TOTAL 6.9         RBC 3.55         RDW 14.1         Sodium 138         Vancomycin, Random 22.2         Vancomycin-Trough   31.5  Comment: Vancomycin critical result(s) called and verbal readback obtained   from Courtney on Med Surg  by HLL 06/03/2022 17:21         WBC 9.46                 Significant Imaging: I have reviewed all pertinent imaging results/findings within the past 24 hours.

## 2022-06-04 NOTE — PT/OT/SLP PROGRESS
Occupational Therapy   Treatment    Name: Aurelia Ruggiero  MRN: [id number]  Admitting Diagnosis:  Sepsis       Recommendations:     Discharge Recommendations: other (see comments) (TBD closer to dicharge)  Discharge Equipment Recommendations:  none  Barriers to discharge:   (Further medical care required)    Assessment:     Aurelia Ruggiero is a 67 y.o. female with a medical diagnosis of Sepsis. Performance deficits affecting function are weakness, impaired endurance, impaired self care skills, impaired functional mobilty, impaired balance, decreased coordination, decreased safety awareness, pain, impaired cardiopulmonary response to activity.     Rehab Prognosis:  Poor; patient would benefit from acute skilled OT services to address these deficits and reach maximum level of function.       Plan:     Patient to be seen 6 x/week to address the above listed problems via self-care/home management, therapeutic activities, therapeutic exercises, neuromuscular re-education  · Plan of Care Expires: 06/17/22  · Plan of Care Reviewed with: patient    Subjective     Pain/Comfort:  · Pain Rating 1:  (Did not rate pain)  · Location - Side 1: Bilateral  · Location - Orientation 1: lower  · Location 1: back    Objective:     Communicated with: Nurse, PT prior to session.  Patient found supine with telemetry, PureWick upon OT entry to room.    General Precautions: Standard, fall   Orthopedic Precautions:N/A   Braces: N/A  Respiratory Status: Room air     Occupational Performance:     Bed Mobility:    · Patient completed Rolling/Turning to Left with  maximal assistance and 2 persons  · Patient completed Rolling/Turning to Right with maximal assistance and 2 persons  · Patient completed Scooting/Bridging with maximal assistance and 2 persons     Treatment & Education:  Pt hesitant to participate in therapy this day due to consistent back pain. Pt educated on importance of movement in healing and assisting to decrease pain while also  preventing muscle atrophy. Pt agreeable to attempt. Co-treat with PT to maximize pt potential. Pt attempting to log roll to R side at max A x2 due to back pain. Pt able to get legs off side of bed with PT assistance. When attempting to transition from R sidelying to sitting EOB with ROBERT and PT assist pt screamed out and refused to progress further. Pt stating she need to lie back down immediately due to pain. Pt positioned for comfort in supine with pillow under L hip. Pt reporting comfort before exiting room.    Patient left supine with all lines intact, call button in reach and nurse notifiedEducation:      GOALS:   Multidisciplinary Problems     Occupational Therapy Goals        Problem: Occupational Therapy    Goal Priority Disciplines Outcome Interventions   Occupational Therapy Goal     OT, PT/OT     Description: Goals to be met by: discharge    Patient will increase functional independence with ADLs by performing:    UE Dressing with Minimal Assistance.  LE Dressing with Minimal Assistance.  Toileting from toilet with Minimal Assistance for hygiene and clothing management.   Sitting at edge of bed x10 minutes with Minimal Assistance.  Supine to sit with Minimal Assistance.                     Time Tracking:     OT Date of Treatment: 06/04/22  OT Start Time: 0915  OT Stop Time: 0932  OT Total Time (min): 17 min    Billable Minutes:Therapeutic Activity 17    OT/CARI: CARI          6/4/2022

## 2022-06-04 NOTE — ASSESSMENT & PLAN NOTE
Patient with Persistent (7 days or more) atrial fibrillation which is controlled currently with Beta Blocker and Amiodarone. Patient is currently in atrial fibrillation.[id number] Score: 2. Anticoagulation indicated. Anticoagulation done with eliquis.  Cards helpign due to recent rvr afib and recent pulm edema

## 2022-06-04 NOTE — PT/OT/SLP EVAL
Physical Therapy  Evaluation    Aurelia Ruggiero   MRN: [id number]   Admitting Diagnosis: Sepsis                          Billable Minutes:  Evaluation 17 minutes    Diagnosis: Sepsis; non-traumatic lower back pain  HPI: 67-year-old female presents to the ED via EMS with reports of worsening lower back pain.  Patient denies any falls or trauma reports that pain starts and left lower back and radiates down to left leg.  Patient reports she has a history of arthritis but denies any surgeries or procedures on back.  EMS reports patient was noted to be O2 sat on room air of 88%.         Past Medical History:   Diagnosis Date    Adult hypothyroidism     Anxiety     Atherosclerosis of both carotid arteries     Atrial fibrillation     Atrial fibrillation     CHF (congestive heart failure)     Moderate aortic valve stenosis       Past Surgical History:   Procedure Laterality Date    APPENDECTOMY      COLONOSCOPY N/A 1/28/2022    Procedure: COLONOSCOPY;  Surgeon: Jl Terrazas MD;  Location: [facility] ENDO;  Service: General;  Laterality: N/A;    KNEE SURGERY      TONSILLECTOMY      TREATMENT OF CARDIAC ARRHYTHMIA N/A 10/27/2020    Procedure: Cardioversion or Defibrillation;  Surgeon: Gavin Duran MD;  Location: [facility] CATH;  Service: Cardiovascular;  Laterality: N/A;  APPROVED PER POORNIMA 10/6       Referring physician: Bess  Date referred to PT: 6/3/22    General Precautions: Standard,  fall               Patient History:     DME owned (not currently used): rolling walker, bedside commode, shower chair, wheelchair and grab bar    Previous Level of Function:   Independent community mobility; works at [employer]; drives; Pt states she took a shower about Midnight and woke up about 4 am and could not get OOB due to pain in her back; denies any injury to back    Subjective:  Communicated with nurse prior to and after session.    Pain: 10/10 FACES scale    Chief Complaint: severe pain across lower back ; states she cannot  raise HOB or sit EOB  Patient goals: decrease pain           Objective:         Cognitive Exam:  Oriented to: Person, Place, Time and Situation    Follows Commands/attention: Follows multistep  commands  Communication: clear/fluent  Safety awareness/insight to disability: intact    Physical Exam:  Postural examination/scapula alignment:    -       Forward head    Skin integrity: Visible skin intact  Edema: None noted      Sensation:   Intact Light touch B LE's      Lower Extremity Range of Motion: moves them slow and cautiously due to back pain  Right Lower Extremity: WFL  Left Lower Extremity: WFL    Lower Extremity Strength:  Right Lower Extremity: due to severe back pain unable to resist at hips; ankles and knee appear WFLs  Left Lower Extremity: as R LE     Fine motor coordination:     -       Intact    Gross motor coordination: WFL    Functional Mobility:  Bed Mobility:   -rolling supine to R side max assist x 2; multiple attempts due to pt c/o severe pain; she is able to assist given time  -attempted sidelying to sit x 2 or 3 trials, pt screaming in pain with max-total assist x 2 and throwing her shoulders back down to bed. Unable to get pt into sitting position despite max attempt of 2.    Transfers:   unable due to pain    Gait:     unable due to pain    Stairs:  NA    Balance:   NA-pt unable to tolerate sitting due to pain    Patient left supine with all lines intact and nurse notified.    Assessment:   Aurelia Ruggiero is a 67 y.o. female with a medical diagnosis of Sepsis and presents with severe lower back pain isolated to across top of pelvis and around to side of pelvis B; severe limitation of mobility.    Rehab identified problem list/impairments:      Rehab potential is fair.    Activity tolerance: Poor    Discharge recommendations:   pending tolerance and progression with therapy    Barriers to discharge:      Equipment recommendations:       GOALS:   Multidisciplinary Problems     Physical Therapy  Goals        Problem: Physical Therapy    Goal Priority Disciplines Outcome Goal Variances Interventions   Physical Therapy Goal     PT, PT/OT      Description: Goals to be met by: 22    Patient will increase functional independence with mobility by performin. Supine to sit with Moderate Assistance  2. Sit to supine with Moderate Assistance  3. Rolling to Left and Right with Moderate Assistance.  4. Sitting at edge of bed x10 minutes with Minimal Assistance                     PLAN:    Patient to be seen 5-6 days/week   to address the above listed problems via  progressive mobilization as pt tolerates  Plan of Care expires:  22  Plan of Care reviewed with:  patient          2022

## 2022-06-04 NOTE — ASSESSMENT & PLAN NOTE
kdur qd - monitor levels    Patient has hypokalemia.  Last electrolytes reviewed- Recent Labs   Lab 06/03/22  0837 06/04/22  3881   K 3.3* 4.0   Will replace potassium as per sliding scale as needed and monitor electrolytes closely.

## 2022-06-04 NOTE — ASSESSMENT & PLAN NOTE
"This patient does have evidence of infective focus  My overall impression is sepsis. Vital signs were reviewed and noted in progress note.  Antibiotics given-   Antibiotics (From admission, onward)            Start     Stop Route Frequency Ordered    06/04/22 0900  vancomycin in dextrose 5 % 1 gram/250 mL IVPB 1,000 mg         -- IV Every 12 hours (non-standard times) 06/04/22 0714    06/02/22 0537  vancomycin - pharmacy to dose  (vancomycin IVPB)        "And" Linked Group Details    -- IV pharmacy to manage frequency 06/02/22 0437    06/02/22 0500  cefTRIAXone (ROCEPHIN) 1 g/50 mL D5W IVPB         06/09 0459 IV Every 12 hours (non-standard times) 06/02/22 0437        Cultures were taken-   Microbiology Results (last 7 days)     Procedure Component Value Units Date/Time    Blood culture x two cultures. Draw prior to antibiotics. [[id number]]  (Abnormal) Collected: 06/01/22 1019    Order Status: Completed Specimen: Blood Updated: 06/04/22 1055     Blood Culture, Routine Gram stain aer bottle: Gram positive cocci in chains resembling Strep       Gram stain nita bottle: Gram positive cocci in chains resembling Strep       Positive results previously called 06/02/2022  05:14      ENTEROCOCCUS FAECALIS  For susceptibility see order # [id number]      Narrative:      Aerobic and anaerobic    Blood culture x two cultures. Draw prior to antibiotics. [[id number]]  (Abnormal) Collected: 06/01/22 1002    Order Status: Completed Specimen: Blood Updated: 06/04/22 1054     Blood Culture, Routine Gram stain nita bottle: Gram positive cocci in chains resembling Strep       Results called to and read back by: Karen Davis RN 06/02/2022        03:36      Gram stain aer bottle: Gram positive cocci in chains resembling Strep       Positive results previously called 06/02/2022  05:15      ENTEROCOCCUS FAECALIS  Susceptibility pending      Narrative:      Aerobic and anaerobic        Latest lactate reviewed, they are-  No results for " input(s): LACTATE in the last 72 hours.    Organ dysfunction indicated by Acute respiratory failure  Source- uti, bacteremia    Source control Achieved by- iv abxs  6/3 cont iv abxs- follow sensitivites  6/4:  Awaiting sensitivities.  Draw surveillance cultures.

## 2022-06-04 NOTE — PROGRESS NOTES
[facility] Medicine  Progress Note    Patient Name: Aurelia Ruggiero  MRN: [id number]  Patient Class: IP- Inpatient   Admission Date: 6/1/2022  Length of Stay: 3 days  Attending Physician: Marimar Kellogg MD  Primary Care Provider: Marimar Kellogg MD        Subjective:     Principal Problem:Sepsis        HPI:  67-year-old female presents to the ED via EMS with reports of worsening lower back pain.  Patient denies any falls or trauma reports that pain starts and left lower back and radiates down to left leg.  Patient reports she has a history of arthritis but denies any surgeries or procedures on back.  EMS reports patient was noted to be O2 sat on room air of 88%.  Patient has a history of AFib but reports not taking medications this morning.  Patient's PCP is Dr. Kellogg       Overview/Hospital Course:  6/3 ND pt more peppy this am - still havign lower back and side pain.   Informed pt and son of her sepsis dx and need for iv abxs.  Breathing is better per pt.  6/4 WC:  Tolerating treatment well thus far.  Still very fatigued.       Past Medical History:   Diagnosis Date    Adult hypothyroidism     Anxiety     Atherosclerosis of both carotid arteries     Atrial fibrillation     Atrial fibrillation     CHF (congestive heart failure)     Moderate aortic valve stenosis        Past Surgical History:   Procedure Laterality Date    APPENDECTOMY      COLONOSCOPY N/A 1/28/2022    Procedure: COLONOSCOPY;  Surgeon: Jl Terrazas MD;  Location: [facility];  Service: General;  Laterality: N/A;    KNEE SURGERY      TONSILLECTOMY      TREATMENT OF CARDIAC ARRHYTHMIA N/A 10/27/2020    Procedure: Cardioversion or Defibrillation;  Surgeon: Gavin Duran MD;  Location: [facility] CATH;  Service: Cardiovascular;  Laterality: N/A;  APPROVED PER POORNIMA 10/6       Review of patient's allergies indicates:   Allergen Reactions    Fish containing products Itching    Shellfish containing products Itching     Latex, natural rubber Hives       No current facility-administered medications on file prior to encounter.     Current Outpatient Medications on File Prior to Encounter   Medication Sig    ALPRAZolam (XANAX) 0.5 MG tablet Take 1-2 tablets by mouth every 6hrs as needed for anxiety    amiodarone (PACERONE) 100 MG Tab Take 1 tablet (100 mg total) by mouth once daily.    ascorbic acid, vitamin C, (VITAMIN C) 1000 MG tablet Take 1,000 mg by mouth once daily.    b complex vitamins tablet Take 1 tablet by mouth once daily.    cyclobenzaprine (FLEXERIL) 5 MG tablet Take 1 tablet (5 mg total) by mouth 3 (three) times daily as needed for Muscle spasms.    ferrous sulfate (FEOSOL) 325 mg (65 mg iron) Tab tablet Take 325 mg by mouth daily with breakfast.    furosemide (LASIX) 20 MG tablet Take 1 tablet (20 mg total) by mouth once daily.    gabapentin (NEURONTIN) 300 MG capsule Take 300 mg by mouth every evening.    HYDROcodone-acetaminophen (NORCO) 7.5-325 mg per tablet Take 1 tablet by mouth every 6 (six) hours as needed for Pain.    levothyroxine (SYNTHROID) 150 MCG tablet Take 150 mcg by mouth before breakfast.    metoprolol tartrate (LOPRESSOR) 50 MG tablet Take 50 mg by mouth 2 (two) times daily.    multivitamin (THERAGRAN) per tablet Take 1 tablet by mouth once daily.    pantoprazole (PROTONIX) 40 MG tablet Take 1 tablet (40 mg total) by mouth 2 (two) times daily.    traMADoL (ULTRAM) 50 mg tablet Take 1 tablet (50 mg total) by mouth every 6 (six) hours as needed for Pain.    vitamin D (VITAMIN D3) 1000 units Tab Take 1,000 Units by mouth 2 (two) times a day.     Family History       Problem Relation (Age of Onset)    Atrial fibrillation Sister    Cancer Brother    Heart failure Father    Hypertension Sister, Brother          Tobacco Use    Smoking status: Never Smoker    Smokeless tobacco: Never Used   Substance and Sexual Activity    Alcohol use: Never    Drug use: Never    Sexual activity: Not on  file     Review of Systems   Constitutional:  Positive for activity change, chills and fatigue. Negative for fever.   HENT:  Negative for sinus pressure and sinus pain.    Respiratory:  Positive for cough and shortness of breath. Negative for wheezing.    Cardiovascular:  Positive for palpitations and leg swelling. Negative for chest pain.   Gastrointestinal:  Positive for abdominal pain. Negative for blood in stool, constipation, diarrhea, nausea and vomiting.   Musculoskeletal:  Positive for arthralgias, back pain and gait problem.   Skin:  Negative for wound.   Neurological:  Positive for weakness. Negative for seizures and syncope.   Objective:     Vital Signs (Most Recent):  Temp: 98.2 °F (36.8 °C) (06/04/22 1126)  Pulse: 78 (06/04/22 1126)  Resp: 20 (06/04/22 1126)  BP: 105/60 (06/04/22 1126)  SpO2: (!) 90 % (06/04/22 1126) Vital Signs (24h Range):  Temp:  [98.2 °F (36.8 °C)-99.2 °F (37.3 °C)] 98.2 °F (36.8 °C)  Pulse:  [] 78  Resp:  [18-22] 20  SpO2:  [90 %-92 %] 90 %  BP: (101-133)/(60-87) 105/60     Weight: 136.1 kg (300 lb)  Body mass index is 51.49 kg/m².    Physical Exam  Constitutional:       Appearance: She is obese. She is ill-appearing.   HENT:      Head: Normocephalic.      Nose: Nose normal.      Mouth/Throat:      Mouth: Mucous membranes are moist.   Eyes:      Extraocular Movements: Extraocular movements intact.      Pupils: Pupils are equal, round, and reactive to light.   Cardiovascular:      Rate and Rhythm: Normal rate and regular rhythm.   Pulmonary:      Effort: Pulmonary effort is normal.      Breath sounds: Normal breath sounds.   Abdominal:      General: Bowel sounds are normal. There is no distension.      Palpations: Abdomen is soft.      Tenderness: There is abdominal tenderness.   Musculoskeletal:      Cervical back: Normal range of motion.   Skin:     General: Skin is warm and dry.      Comments: 1+ pedal pulse on left; 2 + pedal pulse on right   Neurological:      Mental  Status: She is alert and oriented to person, place, and time.         CRANIAL NERVES     CN III, IV, VI   Pupils are equal, round, and reactive to light.     Significant Labs: All pertinent labs within the past 24 hours have been reviewed.  Recent Lab Results         06/04/22  0355   06/03/22  1639        Albumin 2.3         Alkaline Phosphatase 109         ALT 19         Anion Gap 7         AST 25         Baso # 0.09         Basophil % 1.0         BILIRUBIN TOTAL 1.1  Comment: For infants and newborns, interpretation of results should be based  on gestational age, weight and in agreement with clinical  observations.    Premature Infant recommended reference ranges:  Up to 24 hours.............<8.0 mg/dL  Up to 48 hours............<12.0 mg/dL  3-5 days..................<15.0 mg/dL  6-29 days.................<15.0 mg/dL    For patients on Eltrombopag therapy, use of Dimension Wilkinson TBIL is   not   recommended.           BUN 16         Calcium 8.2         Chloride 101         CO2 30         Creatinine 0.9         Differential Method Automated         eGFR if  >60.0         eGFR if non  >60.0  Comment: Calculation used to obtain the estimated glomerular filtration  rate (eGFR) is the CKD-EPI equation.            Eos # 0.3         Eosinophil % 2.9         Glucose 100         Gran # (ANC) 5.7         Gran % 60.1         Hematocrit 33.4         Hemoglobin 11.2         Immature Grans (Abs) 0.03  Comment: Mild elevation in immature granulocytes is non specific and   can be seen in a variety of conditions including stress response,   acute inflammation, trauma and pregnancy. Correlation with other   laboratory and clinical findings is essential.           Immature Granulocytes 0.3         Lymph # 2.1         Lymph % 22.4         Magnesium 2.2         MCH 31.5         MCHC 33.5         MCV 94         Mono # 1.3         Mono % 13.3         MPV 11.3         nRBC 0         Platelets 228          "Potassium 4.0         PROTEIN TOTAL 6.9         RBC 3.55         RDW 14.1         Sodium 138         Vancomycin, Random 22.2         Vancomycin-Trough   31.5  Comment: Vancomycin critical result(s) called and verbal readback obtained   from Courtney on Med Surg  by HLL 06/03/2022 17:21         WBC 9.46                 Significant Imaging: I have reviewed all pertinent imaging results/findings within the past 24 hours.      Assessment/Plan:      * Sepsis  This patient does have evidence of infective focus  My overall impression is sepsis. Vital signs were reviewed and noted in progress note.  Antibiotics given-   Antibiotics (From admission, onward)            Start     Stop Route Frequency Ordered    06/04/22 0900  vancomycin in dextrose 5 % 1 gram/250 mL IVPB 1,000 mg         -- IV Every 12 hours (non-standard times) 06/04/22 0714    06/02/22 0537  vancomycin - pharmacy to dose  (vancomycin IVPB)        "And" Linked Group Details    -- IV pharmacy to manage frequency 06/02/22 0437    06/02/22 0500  cefTRIAXone (ROCEPHIN) 1 g/50 mL D5W IVPB         06/09 0459 IV Every 12 hours (non-standard times) 06/02/22 0437        Cultures were taken-   Microbiology Results (last 7 days)     Procedure Component Value Units Date/Time    Blood culture x two cultures. Draw prior to antibiotics. [[id number]]  (Abnormal) Collected: 06/01/22 1019    Order Status: Completed Specimen: Blood Updated: 06/04/22 1055     Blood Culture, Routine Gram stain aer bottle: Gram positive cocci in chains resembling Strep       Gram stain nita bottle: Gram positive cocci in chains resembling Strep       Positive results previously called 06/02/2022  05:14      ENTEROCOCCUS FAECALIS  For susceptibility see order # [id number]      Narrative:      Aerobic and anaerobic    Blood culture x two cultures. Draw prior to antibiotics. [[id number]]  (Abnormal) Collected: 06/01/22 1002    Order Status: Completed Specimen: Blood Updated: 06/04/22 1054     Blood " Culture, Routine Gram stain nita bottle: Gram positive cocci in chains resembling Strep       Results called to and read back by: Karen Davis RN 06/02/2022        03:36      Gram stain aer bottle: Gram positive cocci in chains resembling Strep       Positive results previously called 06/02/2022  05:15      ENTEROCOCCUS FAECALIS  Susceptibility pending      Narrative:      Aerobic and anaerobic        Latest lactate reviewed, they are-  No results for input(s): LACTATE in the last 72 hours.    Organ dysfunction indicated by Acute respiratory failure  Source- uti, bacteremia    Source control Achieved by- iv abxs  6/3 cont iv abxs- follow sensitivites  6/4:  Awaiting sensitivities.  Draw surveillance cultures.     Myopathy  Pt/ot consult      Hypokalemia  kdur qd - monitor levels    Patient has hypokalemia.  Last electrolytes reviewed- Recent Labs   Lab 06/03/22  0837 06/04/22  0355   K 3.3* 4.0   Will replace potassium as per sliding scale as needed and monitor electrolytes closely.         Abdominal pain  Ct of abd/pelvis - was negative      Longstanding persistent atrial fibrillation  Patient with Persistent (7 days or more) atrial fibrillation which is controlled currently with Beta Blocker and Amiodarone. Patient is currently in atrial fibrillation.[id number] Score: 2. Anticoagulation indicated. Anticoagulation done with eliquis.  Cards helpign due to recent rvr afib and recent pulm edema      Lumbar disc disease with radiculopathy  Pt with severe back pain - ct of back shows no fractures but severe degenerative dz      Acute pulmonary edema  Resolved =- cards chagned to aldactone now        VTE Risk Mitigation (From admission, onward)         Ordered     apixaban tablet 5 mg  2 times daily         06/02/22 1742     IP VTE HIGH RISK PATIENT  Once         06/01/22 1440     Place sequential compression device  Until discontinued         06/01/22 1440                Discharge Planning   PARISH:      Code Status:  Full Code   Is the patient medically ready for discharge?:     Reason for patient still in hospital (select all that apply): Treatment  Discharge Plan A: Home, Home Health                  Rito Akers Jr, MD  Department of Hospital Medicine   [facility]

## 2022-06-04 NOTE — PLAN OF CARE
Plan of care reviewed with the patient. Patient had some severe back pain when PT and OT were working with her. Patient was given PRN hydromorphone with moderate relief.

## 2022-06-04 NOTE — PROGRESS NOTES
Pharmacokinetic Assessment Follow Up: IV Vancomycin    Vancomycin serum concentration assessment(s):    The trough level was drawn correctly and can be used to guide therapy at this time. The measurement is above the desired definitive target range of 15 to 20 mcg/mL.  Pt was on 1750 q 12 hrs which resulted in a trough of > 30. Dose held and level came down to 22 with no bumps in scr. Restarted at lower dose of 1 gram q 12 hrs.     Vancomycin Regimen Plan:    Change regimen to Vancomycin 1000 mg IV every 12 hours with next serum trough concentration measured at 2000 prior to 4th dose on 06/05    Drug levels (last 3 results):  Recent Labs   Lab Result Units 06/03/22  1639 06/04/22  0355   Vancomycin, Random ug/mL  --  22.2   Vancomycin-Trough ug/mL 31.5*  --        Pharmacy will continue to follow and monitor vancomycin.    Please contact pharmacy at extension [phone] for questions regarding this assessment.    Thank you for the consult,   AJAY GARCIA       Patient brief summary:  Aurelia Ruggiero is a 67 y.o. female initiated on antimicrobial therapy with IV Vancomycin for treatment of bacteremia    The patient's current regimen is 1 gram q 12 hr    Drug Allergies:   Review of patient's allergies indicates:   Allergen Reactions    Fish containing products Itching    Shellfish containing products Itching    Latex, natural rubber Hives       Actual Body Weight:   136.1 kg     Renal Function:   Estimated Creatinine Clearance: 81.3 mL/min (based on SCr of 0.9 mg/dL).,     Dialysis Method (if applicable):  N/A    CBC (last 72 hours):  Recent Labs   Lab Result Units 06/01/22  1002 06/02/22  0414 06/03/22  0837 06/04/22  0355   WBC K/uL 10.37 9.93 9.09 9.46   Hemoglobin g/dL 12.5 11.5* 11.4* 11.2*   Hematocrit % 36.8* 34.6* 34.1* 33.4*   Platelets K/uL 213 227 235 228   Gran % % 76.7*  --  62.9 60.1   Lymph % % 13.1*  --  20.9 22.4   Mono % % 8.8  --  13.0 13.3   Eosinophil % % 0.3  --  2.2 2.9   Basophil % % 0.7  --   0.7 1.0   Differential Method  Automated  --  Automated Automated       Metabolic Panel (last 72 hours):  Recent Labs   Lab Result Units 06/01/22  1002 06/01/22  1433 06/02/22  0414 06/03/22  0837 06/04/22  0355   Sodium mmol/L 139  --  138 135* 138   Potassium mmol/L 3.9  --  3.4* 3.3* 4.0   Chloride mmol/L 101  --  100 100 101   CO2 mmol/L 25  --  29 30* 30*   Glucose mg/dL 113*  --  110 119* 100   Glucose, UA   --  Negative  --   --   --    BUN mg/dL 17  --  18 16 16   Creatinine mg/dL 1.0  --  1.1 1.1 0.9   Albumin g/dL 2.6*  --  2.7* 2.3* 2.3*   Total Bilirubin mg/dL 1.6*  --  1.7* 1.1* 1.1*   Alkaline Phosphatase U/L 99  --  98 100 109   AST U/L 30  --  25 27 25   ALT U/L 23  --  19 18 19   Magnesium mg/dL  --   --   --  2.1 2.2       Vancomycin Administrations:  vancomycin given in the last 96 hours                     vancomycin (VANCOCIN) 1,750 mg in dextrose 5 % 500 mL IVPB (mg) 1,750 mg New Bag 06/03/22 0600     1,750 mg New Bag 06/02/22 1800    vancomycin (VANCOCIN) 2,500 mg in dextrose 5 % 500 mL IVPB (mg) 2,500 mg New Bag 06/02/22 0751                    Microbiologic Results:  Microbiology Results (last 7 days)       Procedure Component Value Units Date/Time    Blood culture x two cultures. Draw prior to antibiotics. [[id number]]  (Abnormal) Collected: 06/01/22 1019    Order Status: Completed Specimen: Blood Updated: 06/03/22 1133     Blood Culture, Routine Gram stain aer bottle: Gram positive cocci in chains resembling Strep       Gram stain nita bottle: Gram positive cocci in chains resembling Strep       Positive results previously called 06/02/2022  05:14      ENTEROCOCCUS FAECALIS  For susceptibility see order # [id number]      Narrative:      Aerobic and anaerobic    Blood culture x two cultures. Draw prior to antibiotics. [[id number]]  (Abnormal) Collected: 06/01/22 1002    Order Status: Completed Specimen: Blood Updated: 06/03/22 1132     Blood Culture, Routine Gram stain nita bottle: Gram  positive cocci in chains resembling Strep       Results called to and read back by: Karen Davis RN 06/02/2022        03:36      Gram stain aer bottle: Gram positive cocci in chains resembling Strep       Positive results previously called 06/02/2022  05:15      ENTEROCOCCUS FAECALIS  Susceptibility pending      Narrative:      Aerobic and anaerobic

## 2022-06-05 LAB
ALBUMIN SERPL BCP-MCNC: 2.2 G/DL (ref 3.5–5.2)
ALP SERPL-CCNC: 107 U/L (ref 55–135)
ALT SERPL W/O P-5'-P-CCNC: 18 U/L (ref 10–44)
ANION GAP SERPL CALC-SCNC: 5 MMOL/L (ref 8–16)
AST SERPL-CCNC: 26 U/L (ref 10–40)
BASOPHILS # BLD AUTO: 0.07 K/UL (ref 0–0.2)
BASOPHILS NFR BLD: 0.9 % (ref 0–1.9)
BILIRUB SERPL-MCNC: 1 MG/DL (ref 0.1–1)
BUN SERPL-MCNC: 17 MG/DL (ref 8–23)
CALCIUM SERPL-MCNC: 8.2 MG/DL (ref 8.7–10.5)
CHLORIDE SERPL-SCNC: 103 MMOL/L (ref 95–110)
CO2 SERPL-SCNC: 29 MMOL/L (ref 23–29)
CREAT SERPL-MCNC: 1 MG/DL (ref 0.5–1.4)
DIFFERENTIAL METHOD: ABNORMAL
EOSINOPHIL # BLD AUTO: 0.3 K/UL (ref 0–0.5)
EOSINOPHIL NFR BLD: 3.4 % (ref 0–8)
ERYTHROCYTE [DISTWIDTH] IN BLOOD BY AUTOMATED COUNT: 14.2 % (ref 11.5–14.5)
EST. GFR  (AFRICAN AMERICAN): >60 ML/MIN/1.73 M^2
EST. GFR  (NON AFRICAN AMERICAN): 57.6 ML/MIN/1.73 M^2
GLUCOSE SERPL-MCNC: 103 MG/DL (ref 70–110)
HCT VFR BLD AUTO: 33.8 % (ref 37–48.5)
HGB BLD-MCNC: 11.3 G/DL (ref 12–16)
IMM GRANULOCYTES # BLD AUTO: 0.03 K/UL (ref 0–0.04)
IMM GRANULOCYTES NFR BLD AUTO: 0.4 % (ref 0–0.5)
LYMPHOCYTES # BLD AUTO: 1.8 K/UL (ref 1–4.8)
LYMPHOCYTES NFR BLD: 24 % (ref 18–48)
MAGNESIUM SERPL-MCNC: 2.3 MG/DL (ref 1.6–2.6)
MCH RBC QN AUTO: 31.4 PG (ref 27–31)
MCHC RBC AUTO-ENTMCNC: 33.4 G/DL (ref 32–36)
MCV RBC AUTO: 94 FL (ref 82–98)
MONOCYTES # BLD AUTO: 1.1 K/UL (ref 0.3–1)
MONOCYTES NFR BLD: 15 % (ref 4–15)
NEUTROPHILS # BLD AUTO: 4.3 K/UL (ref 1.8–7.7)
NEUTROPHILS NFR BLD: 56.3 % (ref 38–73)
NRBC BLD-RTO: 0 /100 WBC
PLATELET # BLD AUTO: 242 K/UL (ref 150–450)
PMV BLD AUTO: 10.6 FL (ref 9.2–12.9)
POTASSIUM SERPL-SCNC: 4 MMOL/L (ref 3.5–5.1)
PROT SERPL-MCNC: 7 G/DL (ref 6–8.4)
RBC # BLD AUTO: 3.6 M/UL (ref 4–5.4)
SODIUM SERPL-SCNC: 137 MMOL/L (ref 136–145)
VANCOMYCIN TROUGH SERPL-MCNC: 23.6 UG/ML (ref 10–22)
WBC # BLD AUTO: 7.62 K/UL (ref 3.9–12.7)

## 2022-06-05 PROCEDURE — 27000221 HC OXYGEN, UP TO 24 HOURS

## 2022-06-05 PROCEDURE — 83735 ASSAY OF MAGNESIUM: CPT | Performed by: INTERNAL MEDICINE

## 2022-06-05 PROCEDURE — A4216 STERILE WATER/SALINE, 10 ML: HCPCS | Performed by: FAMILY MEDICINE

## 2022-06-05 PROCEDURE — 63600175 PHARM REV CODE 636 W HCPCS: Performed by: INTERNAL MEDICINE

## 2022-06-05 PROCEDURE — 25000003 PHARM REV CODE 250: Performed by: INTERNAL MEDICINE

## 2022-06-05 PROCEDURE — 85025 COMPLETE CBC W/AUTO DIFF WBC: CPT | Performed by: INTERNAL MEDICINE

## 2022-06-05 PROCEDURE — 11000001 HC ACUTE MED/SURG PRIVATE ROOM

## 2022-06-05 PROCEDURE — 36415 COLL VENOUS BLD VENIPUNCTURE: CPT | Performed by: INTERNAL MEDICINE

## 2022-06-05 PROCEDURE — 99900035 HC TECH TIME PER 15 MIN (STAT)

## 2022-06-05 PROCEDURE — 25000003 PHARM REV CODE 250: Performed by: FAMILY MEDICINE

## 2022-06-05 PROCEDURE — 99900031 HC PATIENT EDUCATION (STAT)

## 2022-06-05 PROCEDURE — 80053 COMPREHEN METABOLIC PANEL: CPT | Performed by: INTERNAL MEDICINE

## 2022-06-05 PROCEDURE — 25000003 PHARM REV CODE 250: Performed by: NURSE PRACTITIONER

## 2022-06-05 PROCEDURE — 80202 ASSAY OF VANCOMYCIN: CPT | Performed by: INTERNAL MEDICINE

## 2022-06-05 RX ADMIN — Medication 10 ML: at 10:06

## 2022-06-05 RX ADMIN — SPIRONOLACTONE 12.5 MG: 25 TABLET, FILM COATED ORAL at 09:06

## 2022-06-05 RX ADMIN — VANCOMYCIN HYDROCHLORIDE 1000 MG: 1 INJECTION, POWDER, LYOPHILIZED, FOR SOLUTION INTRAVENOUS at 09:06

## 2022-06-05 RX ADMIN — POTASSIUM CHLORIDE 20 MEQ: 20 TABLET, EXTENDED RELEASE ORAL at 09:06

## 2022-06-05 RX ADMIN — CEFTRIAXONE SODIUM 1 G: 1 INJECTION, POWDER, FOR SOLUTION INTRAMUSCULAR; INTRAVENOUS at 05:06

## 2022-06-05 RX ADMIN — ALPRAZOLAM 0.5 MG: 0.5 TABLET ORAL at 09:06

## 2022-06-05 RX ADMIN — HYDROMORPHONE HYDROCHLORIDE 1 MG: 1 INJECTION, SOLUTION INTRAMUSCULAR; INTRAVENOUS; SUBCUTANEOUS at 10:06

## 2022-06-05 RX ADMIN — VANCOMYCIN HYDROCHLORIDE 750 MG: 750 INJECTION, POWDER, LYOPHILIZED, FOR SOLUTION INTRAVENOUS at 09:06

## 2022-06-05 RX ADMIN — GABAPENTIN 300 MG: 300 CAPSULE ORAL at 09:06

## 2022-06-05 RX ADMIN — APIXABAN 5 MG: 5 TABLET, FILM COATED ORAL at 09:06

## 2022-06-05 RX ADMIN — AMIODARONE HYDROCHLORIDE 100 MG: 100 TABLET ORAL at 09:06

## 2022-06-05 RX ADMIN — METOPROLOL TARTRATE 50 MG: 50 TABLET, FILM COATED ORAL at 09:06

## 2022-06-05 RX ADMIN — PANTOPRAZOLE SODIUM 40 MG: 40 TABLET, DELAYED RELEASE ORAL at 09:06

## 2022-06-05 RX ADMIN — LEVOTHYROXINE SODIUM 150 MCG: 150 TABLET ORAL at 05:06

## 2022-06-05 NOTE — PLAN OF CARE
Plan of care reviewed with the patient. Patient is still experiencing intermittent back pain is refusing to sit up in the bed due to back pain.

## 2022-06-05 NOTE — ASSESSMENT & PLAN NOTE
kdur qd - monitor levels    Patient has hypokalemia.  Last electrolytes reviewed-   Recent Labs   Lab 06/04/22  0355 06/05/22  0337   K 4.0 4.0   Will replace potassium as per sliding scale as needed and monitor electrolytes closely.

## 2022-06-05 NOTE — ASSESSMENT & PLAN NOTE
"This patient does have evidence of infective focus  My overall impression is sepsis. Vital signs were reviewed and noted in progress note.  Antibiotics given-   Antibiotics (From admission, onward)            Start     Stop Route Frequency Ordered    06/04/22 0900  vancomycin in dextrose 5 % 1 gram/250 mL IVPB 1,000 mg         -- IV Every 12 hours (non-standard times) 06/04/22 0714    06/02/22 0537  vancomycin - pharmacy to dose  (vancomycin IVPB)        "And" Linked Group Details    -- IV pharmacy to manage frequency 06/02/22 0437    06/02/22 0500  cefTRIAXone (ROCEPHIN) 1 g/50 mL D5W IVPB         06/09 0459 IV Every 12 hours (non-standard times) 06/02/22 0437        Cultures were taken-   Microbiology Results (last 7 days)     Procedure Component Value Units Date/Time    Blood culture [[id number]] Collected: 06/04/22 1301    Order Status: Completed Specimen: Blood Updated: 06/05/22 0515     Blood Culture, Routine No Growth to date    Blood culture [[id number]] Collected: 06/04/22 1255    Order Status: Completed Specimen: Blood Updated: 06/05/22 0515     Blood Culture, Routine No Growth to date    Blood culture x two cultures. Draw prior to antibiotics. [[id number]]  (Abnormal) Collected: 06/01/22 1019    Order Status: Completed Specimen: Blood Updated: 06/04/22 1251     Blood Culture, Routine Gram stain aer bottle: Gram positive cocci in chains resembling Strep       Gram stain nita bottle: Gram positive cocci in chains resembling Strep       Positive results previously called 06/02/2022  05:14      ENTEROCOCCUS FAECALIS  For susceptibility see order # [id number]      Narrative:      Aerobic and anaerobic    Blood culture x two cultures. Draw prior to antibiotics. [[id number]]  (Abnormal)  (Susceptibility) Collected: 06/01/22 1002    Order Status: Completed Specimen: Blood Updated: 06/04/22 1250     Blood Culture, Routine Gram stain nita bottle: Gram positive cocci in chains resembling Strep       Results called to and " read back by: Karen Davis RN 06/02/2022        03:36      Gram stain aer bottle: Gram positive cocci in chains resembling Strep       Positive results previously called 06/02/2022  05:15      ENTEROCOCCUS FAECALIS    Narrative:      Aerobic and anaerobic        Latest lactate reviewed, they are-  No results for input(s): LACTATE in the last 72 hours.    Organ dysfunction indicated by Acute respiratory failure  Source- uti, bacteremia    Source control Achieved by- iv abxs  6/3 cont iv abxs- follow sensitivites  6/4:  Awaiting sensitivities.  Draw surveillance cultures.   6/5:  Sensitive to vancomycin.  Surveillance cultures negative thus far.

## 2022-06-05 NOTE — PROGRESS NOTES
[facility] Medicine  Progress Note    Patient Name: Aurelia Ruggiero  MRN: [id number]  Patient Class: IP- Inpatient   Admission Date: 6/1/2022  Length of Stay: 4 days  Attending Physician: Marimar Kellogg MD  Primary Care Provider: Marimar Kellogg MD        Subjective:     Principal Problem:Sepsis        HPI:  67-year-old female presents to the ED via EMS with reports of worsening lower back pain.  Patient denies any falls or trauma reports that pain starts and left lower back and radiates down to left leg.  Patient reports she has a history of arthritis but denies any surgeries or procedures on back.  EMS reports patient was noted to be O2 sat on room air of 88%.  Patient has a history of AFib but reports not taking medications this morning.  Patient's PCP is Dr. Kellogg       Overview/Hospital Course:  6/3 ND pt more peppy this am - still havign lower back and side pain.   Informed pt and son of her sepsis dx and need for iv abxs.  Breathing is better per pt.  6/4 WC:  Tolerating treatment well thus far.  Still very fatigued.   6/5 WC:  Surveillance cultures negative thus far.  Patient remains fatigued with diffuse pain.      Past Medical History:   Diagnosis Date    Adult hypothyroidism     Anxiety     Atherosclerosis of both carotid arteries     Atrial fibrillation     Atrial fibrillation     CHF (congestive heart failure)     Moderate aortic valve stenosis        Past Surgical History:   Procedure Laterality Date    APPENDECTOMY      COLONOSCOPY N/A 1/28/2022    Procedure: COLONOSCOPY;  Surgeon: Jl Terrazsa MD;  Location: [facility] ENDO;  Service: General;  Laterality: N/A;    KNEE SURGERY      TONSILLECTOMY      TREATMENT OF CARDIAC ARRHYTHMIA N/A 10/27/2020    Procedure: Cardioversion or Defibrillation;  Surgeon: Gavin Duran MD;  Location: [facility] CATH;  Service: Cardiovascular;  Laterality: N/A;  APPROVED PER POORN[facility] 10/6       Review of patient's allergies indicates:    Allergen Reactions    Fish containing products Itching    Shellfish containing products Itching    Latex, natural rubber Hives       No current facility-administered medications on file prior to encounter.     Current Outpatient Medications on File Prior to Encounter   Medication Sig    ALPRAZolam (XANAX) 0.5 MG tablet Take 1-2 tablets by mouth every 6hrs as needed for anxiety    amiodarone (PACERONE) 100 MG Tab Take 1 tablet (100 mg total) by mouth once daily.    ascorbic acid, vitamin C, (VITAMIN C) 1000 MG tablet Take 1,000 mg by mouth once daily.    b complex vitamins tablet Take 1 tablet by mouth once daily.    cyclobenzaprine (FLEXERIL) 5 MG tablet Take 1 tablet (5 mg total) by mouth 3 (three) times daily as needed for Muscle spasms.    ferrous sulfate (FEOSOL) 325 mg (65 mg iron) Tab tablet Take 325 mg by mouth daily with breakfast.    furosemide (LASIX) 20 MG tablet Take 1 tablet (20 mg total) by mouth once daily.    gabapentin (NEURONTIN) 300 MG capsule Take 300 mg by mouth every evening.    HYDROcodone-acetaminophen (NORCO) 7.5-325 mg per tablet Take 1 tablet by mouth every 6 (six) hours as needed for Pain.    levothyroxine (SYNTHROID) 150 MCG tablet Take 150 mcg by mouth before breakfast.    metoprolol tartrate (LOPRESSOR) 50 MG tablet Take 50 mg by mouth 2 (two) times daily.    multivitamin (THERAGRAN) per tablet Take 1 tablet by mouth once daily.    pantoprazole (PROTONIX) 40 MG tablet Take 1 tablet (40 mg total) by mouth 2 (two) times daily.    traMADoL (ULTRAM) 50 mg tablet Take 1 tablet (50 mg total) by mouth every 6 (six) hours as needed for Pain.    vitamin D (VITAMIN D3) 1000 units Tab Take 1,000 Units by mouth 2 (two) times a day.     Family History       Problem Relation (Age of Onset)    Atrial fibrillation Sister    Cancer Brother    Heart failure Father    Hypertension Sister, Brother          Tobacco Use    Smoking status: Never Smoker    Smokeless tobacco: Never Used    Substance and Sexual Activity    Alcohol use: Never    Drug use: Never    Sexual activity: Not on file     Review of Systems   Constitutional:  Positive for activity change, chills and fatigue. Negative for fever.   HENT:  Negative for sinus pressure and sinus pain.    Respiratory:  Positive for cough and shortness of breath. Negative for wheezing.    Cardiovascular:  Positive for palpitations and leg swelling. Negative for chest pain.   Gastrointestinal:  Positive for abdominal pain. Negative for blood in stool, constipation, diarrhea, nausea and vomiting.   Musculoskeletal:  Positive for arthralgias, back pain and gait problem.   Skin:  Negative for wound.   Neurological:  Positive for weakness. Negative for seizures and syncope.   Objective:     Vital Signs (Most Recent):  Temp: 98.6 °F (37 °C) (06/05/22 0429)  Pulse: 84 (06/05/22 0735)  Resp: 18 (06/05/22 0735)  BP: 115/70 (06/05/22 0920)  SpO2: (!) 90 % (06/05/22 0735) Vital Signs (24h Range):  Temp:  [98.2 °F (36.8 °C)-100.1 °F (37.8 °C)] 98.6 °F (37 °C)  Pulse:  [] 84  Resp:  [17-20] 18  SpO2:  [90 %-96 %] 90 %  BP: ()/(51-77) 115/70     Weight: 136.1 kg (300 lb)  Body mass index is 51.49 kg/m².    Physical Exam  Constitutional:       Appearance: She is obese. She is ill-appearing.   HENT:      Head: Normocephalic.      Nose: Nose normal.      Mouth/Throat:      Mouth: Mucous membranes are moist.   Eyes:      Extraocular Movements: Extraocular movements intact.      Pupils: Pupils are equal, round, and reactive to light.   Cardiovascular:      Rate and Rhythm: Normal rate and regular rhythm.   Pulmonary:      Effort: Pulmonary effort is normal.      Breath sounds: Normal breath sounds.   Abdominal:      General: Bowel sounds are normal. There is no distension.      Palpations: Abdomen is soft.      Tenderness: There is abdominal tenderness.   Musculoskeletal:      Cervical back: Normal range of motion.   Skin:     General: Skin is warm and  dry.      Comments: 1+ pedal pulse on left; 2 + pedal pulse on right   Neurological:      Mental Status: She is alert and oriented to person, place, and time.         CRANIAL NERVES     CN III, IV, VI   Pupils are equal, round, and reactive to light.     Significant Labs: All pertinent labs within the past 24 hours have been reviewed.  Recent Lab Results         06/05/22  0337   06/04/22  1301   06/04/22  1255        Albumin 2.2           Alkaline Phosphatase 107           ALT 18           Anion Gap 5           AST 26           Baso # 0.07           Basophil % 0.9           BILIRUBIN TOTAL 1.0  Comment: For infants and newborns, interpretation of results should be based  on gestational age, weight and in agreement with clinical  observations.    Premature Infant recommended reference ranges:  Up to 24 hours.............<8.0 mg/dL  Up to 48 hours............<12.0 mg/dL  3-5 days..................<15.0 mg/dL  6-29 days.................<15.0 mg/dL    For patients on Eltrombopag therapy, use of Dimension [place] TBIL is   not   recommended.             Blood Culture, Routine   No Growth to date  [P]   No Growth to date  [P]       BUN 17           Calcium 8.2           Chloride 103           CO2 29           Creatinine 1.0           Differential Method Automated           eGFR if  >60.0           eGFR if non  57.6  Comment: Calculation used to obtain the estimated glomerular filtration  rate (eGFR) is the CKD-EPI equation.              Eos # 0.3           Eosinophil % 3.4           Glucose 103           Gran # (ANC) 4.3           Gran % 56.3           Hematocrit 33.8           Hemoglobin 11.3           Immature Grans (Abs) 0.03  Comment: Mild elevation in immature granulocytes is non specific and   can be seen in a variety of conditions including stress response,   acute inflammation, trauma and pregnancy. Correlation with other   laboratory and clinical findings is essential.              "Immature Granulocytes 0.4           Lymph # 1.8           Lymph % 24.0           Magnesium 2.3           MCH 31.4           MCHC 33.4           MCV 94           Mono # 1.1           Mono % 15.0           MPV 10.6           nRBC 0           Platelets 242           Potassium 4.0           PROTEIN TOTAL 7.0           RBC 3.60           RDW 14.2           Sodium 137           WBC 7.62                    [P] - Preliminary Result               Significant Imaging: I have reviewed all pertinent imaging results/findings within the past 24 hours.      Assessment/Plan:      * Sepsis  This patient does have evidence of infective focus  My overall impression is sepsis. Vital signs were reviewed and noted in progress note.  Antibiotics given-   Antibiotics (From admission, onward)            Start     Stop Route Frequency Ordered    06/04/22 0900  vancomycin in dextrose 5 % 1 gram/250 mL IVPB 1,000 mg         -- IV Every 12 hours (non-standard times) 06/04/22 0714    06/02/22 0537  vancomycin - pharmacy to dose  (vancomycin IVPB)        "And" Linked Group Details    -- IV pharmacy to manage frequency 06/02/22 0437    06/02/22 0500  cefTRIAXone (ROCEPHIN) 1 g/50 mL D5W IVPB         06/09 0459 IV Every 12 hours (non-standard times) 06/02/22 0437        Cultures were taken-   Microbiology Results (last 7 days)     Procedure Component Value Units Date/Time    Blood culture [[id number]] Collected: 06/04/22 1301    Order Status: Completed Specimen: Blood Updated: 06/05/22 0515     Blood Culture, Routine No Growth to date    Blood culture [[id number]] Collected: 06/04/22 1255    Order Status: Completed Specimen: Blood Updated: 06/05/22 0515     Blood Culture, Routine No Growth to date    Blood culture x two cultures. Draw prior to antibiotics. [[id number]]  (Abnormal) Collected: 06/01/22 1019    Order Status: Completed Specimen: Blood Updated: 06/04/22 1251     Blood Culture, Routine Gram stain aer bottle: Gram positive cocci in chains " resembling Strep       Gram stain nita bottle: Gram positive cocci in chains resembling Strep       Positive results previously called 06/02/2022  05:14      ENTEROCOCCUS FAECALIS  For susceptibility see order # [id number]      Narrative:      Aerobic and anaerobic    Blood culture x two cultures. Draw prior to antibiotics. [[id number]]  (Abnormal)  (Susceptibility) Collected: 06/01/22 1002    Order Status: Completed Specimen: Blood Updated: 06/04/22 1250     Blood Culture, Routine Gram stain nita bottle: Gram positive cocci in chains resembling Strep       Results called to and read back by: Karen Davis RN 06/02/2022        03:36      Gram stain aer bottle: Gram positive cocci in chains resembling Strep       Positive results previously called 06/02/2022  05:15      ENTEROCOCCUS FAECALIS    Narrative:      Aerobic and anaerobic        Latest lactate reviewed, they are-  No results for input(s): LACTATE in the last 72 hours.    Organ dysfunction indicated by Acute respiratory failure  Source- uti, bacteremia    Source control Achieved by- iv abxs  6/3 cont iv abxs- follow sensitivites  6/4:  Awaiting sensitivities.  Draw surveillance cultures.   6/5:  Sensitive to vancomycin.  Surveillance cultures negative thus far.    Myopathy  Pt/ot consult      Hypokalemia  kdur qd - monitor levels    Patient has hypokalemia.  Last electrolytes reviewed-   Recent Labs   Lab 06/04/22  0355 06/05/22  0337   K 4.0 4.0   Will replace potassium as per sliding scale as needed and monitor electrolytes closely.         Abdominal pain  Ct of abd/pelvis - was negative      Longstanding persistent atrial fibrillation  Patient with Persistent (7 days or more) atrial fibrillation which is controlled currently with Beta Blocker and Amiodarone. Patient is currently in atrial fibrillation.[id number] Score: 2. Anticoagulation indicated. Anticoagulation done with eliquis.  Cards helpign due to recent rvr afib and recent pulm edema      Lumbar  disc disease with radiculopathy  Pt with severe back pain - ct of back shows no fractures but severe degenerative dz      Acute pulmonary edema  Resolved =- cards chagned to aldactone now        VTE Risk Mitigation (From admission, onward)         Ordered     apixaban tablet 5 mg  2 times daily         06/02/22 1742     IP VTE HIGH RISK PATIENT  Once         06/01/22 1440     Place sequential compression device  Until discontinued         06/01/22 1440                Discharge Planning   PARISH:      Code Status: Full Code   Is the patient medically ready for discharge?:     Reason for patient still in hospital (select all that apply): Treatment  Discharge Plan A: Home, Home Health                  Rito Akers Jr, MD  Department of Hospital Medicine   [facility]

## 2022-06-05 NOTE — ASSESSMENT & PLAN NOTE
Patient with Persistent (7 days or more) atrial fibrillation which is controlled currently with Beta Blocker and Amiodarone. Patient is currently in atrial fibrillation.[id number] Score: 2. Anticoagulation indicated. Anticoagulation done with eliquis.  Cards helpign due to recent rvr afib and recent pulm edema

## 2022-06-05 NOTE — SUBJECTIVE & OBJECTIVE
Past Medical History:   Diagnosis Date    Adult hypothyroidism     Anxiety     Atherosclerosis of both carotid arteries     Atrial fibrillation     Atrial fibrillation     CHF (congestive heart failure)     Moderate aortic valve stenosis        Past Surgical History:   Procedure Laterality Date    APPENDECTOMY      COLONOSCOPY N/A 1/28/2022    Procedure: COLONOSCOPY;  Surgeon: Jl Terrazas MD;  Location: [facility] ENDO;  Service: General;  Laterality: N/A;    KNEE SURGERY      TONSILLECTOMY      TREATMENT OF CARDIAC ARRHYTHMIA N/A 10/27/2020    Procedure: Cardioversion or Defibrillation;  Surgeon: Gavin Duran MD;  [facility]  Service: Cardiovascular;  Laterality: N/A;  APPROVED PER POORN[facility] 10/6       Review of patient's allergies indicates:   Allergen Reactions    Fish containing products Itching    Shellfish containing products Itching    Latex, natural rubber Hives       No current facility-administered medications on file prior to encounter.     Current Outpatient Medications on File Prior to Encounter   Medication Sig    ALPRAZolam (XANAX) 0.5 MG tablet Take 1-2 tablets by mouth every 6hrs as needed for anxiety    amiodarone (PACERONE) 100 MG Tab Take 1 tablet (100 mg total) by mouth once daily.    ascorbic acid, vitamin C, (VITAMIN C) 1000 MG tablet Take 1,000 mg by mouth once daily.    b complex vitamins tablet Take 1 tablet by mouth once daily.    cyclobenzaprine (FLEXERIL) 5 MG tablet Take 1 tablet (5 mg total) by mouth 3 (three) times daily as needed for Muscle spasms.    ferrous sulfate (FEOSOL) 325 mg (65 mg iron) Tab tablet Take 325 mg by mouth daily with breakfast.    furosemide (LASIX) 20 MG tablet Take 1 tablet (20 mg total) by mouth once daily.    gabapentin (NEURONTIN) 300 MG capsule Take 300 mg by mouth every evening.    HYDROcodone-acetaminophen (NORCO) 7.5-325 mg per tablet Take 1 tablet by mouth every 6 (six) hours as needed for Pain.    levothyroxine  (SYNTHROID) 150 MCG tablet Take 150 mcg by mouth before breakfast.    metoprolol tartrate (LOPRESSOR) 50 MG tablet Take 50 mg by mouth 2 (two) times daily.    multivitamin (THERAGRAN) per tablet Take 1 tablet by mouth once daily.    pantoprazole (PROTONIX) 40 MG tablet Take 1 tablet (40 mg total) by mouth 2 (two) times daily.    traMADoL (ULTRAM) 50 mg tablet Take 1 tablet (50 mg total) by mouth every 6 (six) hours as needed for Pain.    vitamin D (VITAMIN D3) 1000 units Tab Take 1,000 Units by mouth 2 (two) times a day.     Family History       Problem Relation (Age of Onset)    Atrial fibrillation Sister    Cancer Brother    Heart failure Father    Hypertension Sister, Brother          Tobacco Use    Smoking status: Never Smoker    Smokeless tobacco: Never Used   Substance and Sexual Activity    Alcohol use: Never    Drug use: Never    Sexual activity: Not on file     Review of Systems   Constitutional:  Positive for activity change, chills and fatigue. Negative for fever.   HENT:  Negative for sinus pressure and sinus pain.    Respiratory:  Positive for cough and shortness of breath. Negative for wheezing.    Cardiovascular:  Positive for palpitations and leg swelling. Negative for chest pain.   Gastrointestinal:  Positive for abdominal pain. Negative for blood in stool, constipation, diarrhea, nausea and vomiting.   Musculoskeletal:  Positive for arthralgias, back pain and gait problem.   Skin:  Negative for wound.   Neurological:  Positive for weakness. Negative for seizures and syncope.   Objective:     Vital Signs (Most Recent):  Temp: 98.6 °F (37 °C) (06/05/22 0429)  Pulse: 84 (06/05/22 0735)  Resp: 18 (06/05/22 0735)  BP: 115/70 (06/05/22 0920)  SpO2: (!) 90 % (06/05/22 0735) Vital Signs (24h Range):  Temp:  [98.2 °F (36.8 °C)-100.1 °F (37.8 °C)] 98.6 °F (37 °C)  Pulse:  [] 84  Resp:  [17-20] 18  SpO2:  [90 %-96 %] 90 %  BP: ()/(51-77) 115/70     Weight: 136.1 kg (300 lb)  Body mass  index is 51.49 kg/m².    Physical Exam  Constitutional:       Appearance: She is obese. She is ill-appearing.   HENT:      Head: Normocephalic.      Nose: Nose normal.      Mouth/Throat:      Mouth: Mucous membranes are moist.   Eyes:      Extraocular Movements: Extraocular movements intact.      Pupils: Pupils are equal, round, and reactive to light.   Cardiovascular:      Rate and Rhythm: Normal rate and regular rhythm.   Pulmonary:      Effort: Pulmonary effort is normal.      Breath sounds: Normal breath sounds.   Abdominal:      General: Bowel sounds are normal. There is no distension.      Palpations: Abdomen is soft.      Tenderness: There is abdominal tenderness.   Musculoskeletal:      Cervical back: Normal range of motion.   Skin:     General: Skin is warm and dry.      Comments: 1+ pedal pulse on left; 2 + pedal pulse on right   Neurological:      Mental Status: She is alert and oriented to person, place, and time.         CRANIAL NERVES     CN III, IV, VI   Pupils are equal, round, and reactive to light.     Significant Labs: All pertinent labs within the past 24 hours have been reviewed.  Recent Lab Results         06/05/22  0337   06/04/22  1301   06/04/22  1255        Albumin 2.2           Alkaline Phosphatase 107           ALT 18           Anion Gap 5           AST 26           Baso # 0.07           Basophil % 0.9           BILIRUBIN TOTAL 1.0  Comment: For infants and newborns, interpretation of results should be based  on gestational age, weight and in agreement with clinical  observations.    Premature Infant recommended reference ranges:  Up to 24 hours.............<8.0 mg/dL  Up to 48 hours............<12.0 mg/dL  3-5 days..................<15.0 mg/dL  6-29 days.................<15.0 mg/dL    For patients on Eltrombopag therapy, use of Dimension [place] TBIL is   not   recommended.             Blood Culture, Routine   No Growth to date  [P]   No Growth to date  [P]       BUN 17           Calcium  8.2           Chloride 103           CO2 29           Creatinine 1.0           Differential Method Automated           eGFR if  >60.0           eGFR if non  57.6  Comment: Calculation used to obtain the estimated glomerular filtration  rate (eGFR) is the CKD-EPI equation.              Eos # 0.3           Eosinophil % 3.4           Glucose 103           Gran # (ANC) 4.3           Gran % 56.3           Hematocrit 33.8           Hemoglobin 11.3           Immature Grans (Abs) 0.03  Comment: Mild elevation in immature granulocytes is non specific and   can be seen in a variety of conditions including stress response,   acute inflammation, trauma and pregnancy. Correlation with other   laboratory and clinical findings is essential.             Immature Granulocytes 0.4           Lymph # 1.8           Lymph % 24.0           Magnesium 2.3           MCH 31.4           MCHC 33.4           MCV 94           Mono # 1.1           Mono % 15.0           MPV 10.6           nRBC 0           Platelets 242           Potassium 4.0           PROTEIN TOTAL 7.0           RBC 3.60           RDW 14.2           Sodium 137           WBC 7.62                    [P] - Preliminary Result               Significant Imaging: I have reviewed all pertinent imaging results/findings within the past 24 hours.

## 2022-06-06 LAB
ALBUMIN SERPL BCP-MCNC: 2.2 G/DL (ref 3.5–5.2)
ALP SERPL-CCNC: 107 U/L (ref 55–135)
ALT SERPL W/O P-5'-P-CCNC: 21 U/L (ref 10–44)
ANION GAP SERPL CALC-SCNC: 5 MMOL/L (ref 8–16)
AORTIC ROOT ANNULUS: 3.05 CM
AORTIC VALVE CUSP SEPERATION: 0.62 CM
AST SERPL-CCNC: 26 U/L (ref 10–40)
AV INDEX (PROSTH): 0.28
AV MEAN GRADIENT: 55 MMHG
AV PEAK GRADIENT: 81 MMHG
AV VALVE AREA: 0.89 CM2
AV VELOCITY RATIO: 0.26
BASOPHILS # BLD AUTO: 0.1 K/UL (ref 0–0.2)
BASOPHILS NFR BLD: 1.4 % (ref 0–1.9)
BILIRUB SERPL-MCNC: 0.8 MG/DL (ref 0.1–1)
BSA FOR ECHO PROCEDURE: 2.48 M2
BUN SERPL-MCNC: 18 MG/DL (ref 8–23)
CALCIUM SERPL-MCNC: 8.1 MG/DL (ref 8.7–10.5)
CHLORIDE SERPL-SCNC: 104 MMOL/L (ref 95–110)
CO2 SERPL-SCNC: 28 MMOL/L (ref 23–29)
CREAT SERPL-MCNC: 1 MG/DL (ref 0.5–1.4)
CV ECHO LV RWT: 0.73 CM
DIFFERENTIAL METHOD: ABNORMAL
DOP CALC AO PEAK VEL: 4.5 M/S
DOP CALC AO VTI: 97.5 CM
DOP CALC LVOT AREA: 3.2 CM2
DOP CALC LVOT DIAMETER: 2.01 CM
DOP CALC LVOT PEAK VEL: 1.18 M/S
DOP CALC LVOT STROKE VOLUME: 86.33 CM3
DOP CALCLVOT PEAK VEL VTI: 27.22 CM
E/E' RATIO: 11.55 M/S
ECHO LV POSTERIOR WALL: 1.45 CM (ref 0.6–1.1)
EJECTION FRACTION: 70 %
EOSINOPHIL # BLD AUTO: 0.3 K/UL (ref 0–0.5)
EOSINOPHIL NFR BLD: 4.3 % (ref 0–8)
ERYTHROCYTE [DISTWIDTH] IN BLOOD BY AUTOMATED COUNT: 14.1 % (ref 11.5–14.5)
EST. GFR  (AFRICAN AMERICAN): >60 ML/MIN/1.73 M^2
EST. GFR  (NON AFRICAN AMERICAN): 57.6 ML/MIN/1.73 M^2
FRACTIONAL SHORTENING: 39 % (ref 28–44)
GLUCOSE SERPL-MCNC: 100 MG/DL (ref 70–110)
HCT VFR BLD AUTO: 34.1 % (ref 37–48.5)
HGB BLD-MCNC: 11.5 G/DL (ref 12–16)
IMM GRANULOCYTES # BLD AUTO: 0.07 K/UL (ref 0–0.04)
IMM GRANULOCYTES NFR BLD AUTO: 1 % (ref 0–0.5)
INTERVENTRICULAR SEPTUM: 1.74 CM (ref 0.6–1.1)
LA MAJOR: 6.94 CM
LA WIDTH: 5.34 CM
LEFT ATRIUM SIZE: 5.51 CM
LEFT INTERNAL DIMENSION IN SYSTOLE: 2.41 CM (ref 2.1–4)
LEFT VENTRICLE DIASTOLIC VOLUME INDEX: 29.63 ML/M2
LEFT VENTRICLE DIASTOLIC VOLUME: 68.74 ML
LEFT VENTRICLE MASS INDEX: 109 G/M2
LEFT VENTRICLE SYSTOLIC VOLUME INDEX: 8.8 ML/M2
LEFT VENTRICLE SYSTOLIC VOLUME: 20.45 ML
LEFT VENTRICULAR INTERNAL DIMENSION IN DIASTOLE: 3.97 CM (ref 3.5–6)
LEFT VENTRICULAR MASS: 253.94 G
LV LATERAL E/E' RATIO: 9.07 M/S
LV SEPTAL E/E' RATIO: 15.88 M/S
LYMPHOCYTES # BLD AUTO: 1.8 K/UL (ref 1–4.8)
LYMPHOCYTES NFR BLD: 25.2 % (ref 18–48)
MAGNESIUM SERPL-MCNC: 2.2 MG/DL (ref 1.6–2.6)
MCH RBC QN AUTO: 31.6 PG (ref 27–31)
MCHC RBC AUTO-ENTMCNC: 33.7 G/DL (ref 32–36)
MCV RBC AUTO: 94 FL (ref 82–98)
MONOCYTES # BLD AUTO: 1.1 K/UL (ref 0.3–1)
MONOCYTES NFR BLD: 14.8 % (ref 4–15)
MV PEAK E VEL: 1.27 M/S
MV STENOSIS PRESSURE HALF TIME: 73.65 MS
MV VALVE AREA P 1/2 METHOD: 2.99 CM2
NEUTROPHILS # BLD AUTO: 3.8 K/UL (ref 1.8–7.7)
NEUTROPHILS NFR BLD: 53.3 % (ref 38–73)
NRBC BLD-RTO: 0 /100 WBC
PISA TR MAX VEL: 3.44 M/S
PLATELET # BLD AUTO: 256 K/UL (ref 150–450)
PMV BLD AUTO: 10.8 FL (ref 9.2–12.9)
POTASSIUM SERPL-SCNC: 4.2 MMOL/L (ref 3.5–5.1)
PROT SERPL-MCNC: 7.1 G/DL (ref 6–8.4)
PV PEAK VELOCITY: 0.76 CM/S
RA MAJOR: 4.57 CM
RA PRESSURE: 3 MMHG
RA WIDTH: 4.04 CM
RBC # BLD AUTO: 3.64 M/UL (ref 4–5.4)
RIGHT VENTRICULAR END-DIASTOLIC DIMENSION: 2.68 CM
SODIUM SERPL-SCNC: 137 MMOL/L (ref 136–145)
TDI LATERAL: 0.14 M/S
TDI SEPTAL: 0.08 M/S
TDI: 0.11 M/S
TR MAX PG: 47 MMHG
TRICUSPID ANNULAR PLANE SYSTOLIC EXCURSION: 1.87 CM
TV REST PULMONARY ARTERY PRESSURE: 50 MMHG
WBC # BLD AUTO: 7.18 K/UL (ref 3.9–12.7)

## 2022-06-06 PROCEDURE — 63600175 PHARM REV CODE 636 W HCPCS: Performed by: INTERNAL MEDICINE

## 2022-06-06 PROCEDURE — A4216 STERILE WATER/SALINE, 10 ML: HCPCS | Performed by: FAMILY MEDICINE

## 2022-06-06 PROCEDURE — 85025 COMPLETE CBC W/AUTO DIFF WBC: CPT | Performed by: INTERNAL MEDICINE

## 2022-06-06 PROCEDURE — 97535 SELF CARE MNGMENT TRAINING: CPT | Mod: CO

## 2022-06-06 PROCEDURE — 97530 THERAPEUTIC ACTIVITIES: CPT | Mod: CO

## 2022-06-06 PROCEDURE — 99900031 HC PATIENT EDUCATION (STAT)

## 2022-06-06 PROCEDURE — 25000003 PHARM REV CODE 250: Performed by: NURSE PRACTITIONER

## 2022-06-06 PROCEDURE — 83735 ASSAY OF MAGNESIUM: CPT | Performed by: INTERNAL MEDICINE

## 2022-06-06 PROCEDURE — 36415 COLL VENOUS BLD VENIPUNCTURE: CPT | Performed by: INTERNAL MEDICINE

## 2022-06-06 PROCEDURE — 99900035 HC TECH TIME PER 15 MIN (STAT)

## 2022-06-06 PROCEDURE — 25000003 PHARM REV CODE 250: Performed by: INTERNAL MEDICINE

## 2022-06-06 PROCEDURE — 94761 N-INVAS EAR/PLS OXIMETRY MLT: CPT

## 2022-06-06 PROCEDURE — 25000003 PHARM REV CODE 250: Performed by: FAMILY MEDICINE

## 2022-06-06 PROCEDURE — 63700000 PHARM REV CODE 250 ALT 637 W/O HCPCS: Performed by: INTERNAL MEDICINE

## 2022-06-06 PROCEDURE — 97530 THERAPEUTIC ACTIVITIES: CPT

## 2022-06-06 PROCEDURE — 80053 COMPREHEN METABOLIC PANEL: CPT | Performed by: INTERNAL MEDICINE

## 2022-06-06 PROCEDURE — 11000001 HC ACUTE MED/SURG PRIVATE ROOM

## 2022-06-06 RX ORDER — DIAPER,BRIEF,INFANT-TODD,DISP
EACH MISCELLANEOUS 3 TIMES DAILY PRN
Status: DISCONTINUED | OUTPATIENT
Start: 2022-06-06 | End: 2022-06-07

## 2022-06-06 RX ORDER — FLUCONAZOLE 100 MG/1
200 TABLET ORAL DAILY
Status: DISCONTINUED | OUTPATIENT
Start: 2022-06-06 | End: 2022-06-14 | Stop reason: HOSPADM

## 2022-06-06 RX ADMIN — PANTOPRAZOLE SODIUM 40 MG: 40 TABLET, DELAYED RELEASE ORAL at 08:06

## 2022-06-06 RX ADMIN — CEFTRIAXONE SODIUM 1 G: 1 INJECTION, POWDER, FOR SOLUTION INTRAMUSCULAR; INTRAVENOUS at 05:06

## 2022-06-06 RX ADMIN — APIXABAN 5 MG: 5 TABLET, FILM COATED ORAL at 08:06

## 2022-06-06 RX ADMIN — VANCOMYCIN HYDROCHLORIDE 750 MG: 750 INJECTION, POWDER, LYOPHILIZED, FOR SOLUTION INTRAVENOUS at 08:06

## 2022-06-06 RX ADMIN — AMIODARONE HYDROCHLORIDE 100 MG: 100 TABLET ORAL at 08:06

## 2022-06-06 RX ADMIN — METOPROLOL TARTRATE 50 MG: 50 TABLET, FILM COATED ORAL at 08:06

## 2022-06-06 RX ADMIN — HYDROMORPHONE HYDROCHLORIDE 1 MG: 1 INJECTION, SOLUTION INTRAMUSCULAR; INTRAVENOUS; SUBCUTANEOUS at 08:06

## 2022-06-06 RX ADMIN — Medication 10 ML: at 05:06

## 2022-06-06 RX ADMIN — POTASSIUM CHLORIDE 20 MEQ: 20 TABLET, EXTENDED RELEASE ORAL at 08:06

## 2022-06-06 RX ADMIN — VANCOMYCIN HYDROCHLORIDE 750 MG: 750 INJECTION, POWDER, LYOPHILIZED, FOR SOLUTION INTRAVENOUS at 09:06

## 2022-06-06 RX ADMIN — LEVOTHYROXINE SODIUM 150 MCG: 150 TABLET ORAL at 05:06

## 2022-06-06 RX ADMIN — FLUCONAZOLE 200 MG: 100 TABLET ORAL at 05:06

## 2022-06-06 RX ADMIN — SPIRONOLACTONE 12.5 MG: 25 TABLET, FILM COATED ORAL at 08:06

## 2022-06-06 RX ADMIN — ALPRAZOLAM 0.5 MG: 0.5 TABLET ORAL at 08:06

## 2022-06-06 RX ADMIN — GABAPENTIN 300 MG: 300 CAPSULE ORAL at 08:06

## 2022-06-06 NOTE — PROGRESS NOTES
Pharmacokinetic Assessment Follow Up: IV Vancomycin    Vancomycin serum concentration assessment(s):    The trough level was drawn correctly and can be used to guide therapy at this time. The measurement is above the desired definitive target range of 15 to 20 mcg/mL.   - The trough level was drawn ~11 hours after previous dose and resulted at 23.6.    Vancomycin Regimen Plan:    Change regimen to Vancomycin 750 mg IV every 12 hours with next serum trough concentration measured at 0800 prior to 4th dose on 6/7.   - Will decrease regimen. I expect this will put Ms. Ruggiero within range, but may need to consider decreasing frequency to Q24H if she's still supra-therapeutic.   - Ms. Ruggiero's renal function has remained stable and at baseline.  - Please draw random level sooner than scheduled trough if renal function changes significantly.    Drug levels (last 3 results):  Recent Labs   Lab Result Units 06/03/22  1639 06/04/22  0355 06/05/22 2001   Vancomycin, Random ug/mL  --  22.2  --    Vancomycin-Trough ug/mL 31.5*  --  23.6*       Pharmacy will continue to follow and monitor vancomycin.    Please contact pharmacy for questions regarding this assessment.    Thank you for the consult,   Bebe Rosas       Patient brief summary:  Aurelia Ruggiero is a 67 y.o. female initiated on antimicrobial therapy with IV Vancomycin for treatment of bacteremia    Actual Body Weight:   136.1 kg    Renal Function:   Estimated Creatinine Clearance: 73.2 mL/min (based on SCr of 1 mg/dL).    Dialysis Method (if applicable):  N/A

## 2022-06-06 NOTE — ASSESSMENT & PLAN NOTE
"This patient does have evidence of infective focus  My overall impression is sepsis. Vital signs were reviewed and noted in progress note.  Antibiotics given-   Antibiotics (From admission, onward)            Start     Stop Route Frequency Ordered    06/05/22 2100  vancomycin 750 mg in dextrose 5 % 250 mL IVPB (ready to mix system)         -- IV Every 12 hours (non-standard times) 06/05/22 2029 06/02/22 0537  vancomycin - pharmacy to dose  (vancomycin IVPB)        "And" Linked Group Details    -- IV pharmacy to manage frequency 06/02/22 0437    06/02/22 0500  cefTRIAXone (ROCEPHIN) 1 g/50 mL D5W IVPB         06/09 0459 IV Every 12 hours (non-standard times) 06/02/22 0437        Cultures were taken-   Microbiology Results (last 7 days)     Procedure Component Value Units Date/Time    Blood culture [[id number]] Collected: 06/04/22 1255    Order Status: Completed Specimen: Blood Updated: 06/05/22 2212     Blood Culture, Routine No Growth to date      No Growth to date    Blood culture [[id number]] Collected: 06/04/22 1301    Order Status: Completed Specimen: Blood Updated: 06/05/22 2212     Blood Culture, Routine No Growth to date      No Growth to date    Blood culture x two cultures. Draw prior to antibiotics. [[id number]]  (Abnormal) Collected: 06/01/22 1019    Order Status: Completed Specimen: Blood Updated: 06/04/22 1251     Blood Culture, Routine Gram stain aer bottle: Gram positive cocci in chains resembling Strep       Gram stain nita bottle: Gram positive cocci in chains resembling Strep       Positive results previously called 06/02/2022  05:14      ENTEROCOCCUS FAECALIS  For susceptibility see order # [id number]      Narrative:      Aerobic and anaerobic    Blood culture x two cultures. Draw prior to antibiotics. [[id number]]  (Abnormal)  (Susceptibility) Collected: 06/01/22 1002    Order Status: Completed Specimen: Blood Updated: 06/04/22 1250     Blood Culture, Routine Gram stain nita bottle: Gram positive " cocci in chains resembling Strep       Results called to and read back by: Karen Davis RN 06/02/2022        03:36      Gram stain aer bottle: Gram positive cocci in chains resembling Strep       Positive results previously called 06/02/2022  05:15      ENTEROCOCCUS FAECALIS    Narrative:      Aerobic and anaerobic        Latest lactate reviewed, they are-  No results for input(s): LACTATE in the last 72 hours.    Organ dysfunction indicated by Acute respiratory failure  Source- uti, bacteremia    Source control Achieved by- iv abxs  6/3 cont iv abxs- follow sensitivites  6/4:  Awaiting sensitivities.  Draw surveillance cultures.   6/5:  Sensitive to vancomycin.  Surveillance cultures negative thus far.  6/6: Continue abx therapy with Rocephin and Vancomycin (day 5). Monitoring repeat blood cultures

## 2022-06-06 NOTE — ASSESSMENT & PLAN NOTE
Patient with Persistent (7 days or more) atrial fibrillation which is controlled currently with Beta Blocker and Amiodarone. Patient is currently in atrial fibrillation.[id number] Score: 2. Anticoagulation indicated. Anticoagulation done with eliquis.  Cards helpign due to recent rvr afib and recent pulm edema

## 2022-06-06 NOTE — PT/OT/SLP PROGRESS
"Occupational Therapy   Treatment    Name: Aurelia Ruggiero  MRN: [id number]  Admitting Diagnosis:  Sepsis       Recommendations:     Discharge Recommendations: other (see comments) (TBD closer to [facility])  Discharge Equipment Recommendations:  none  Barriers to discharge:   (Further medical care required)    Assessment:     Aurelia Ruggiero is a 67 y.o. female with a medical diagnosis of Sepsis. Performance deficits affecting function are weakness, impaired endurance, impaired self care skills, impaired functional mobilty, impaired balance, decreased coordination, pain, decreased ROM.     Rehab Prognosis:  Poor; patient would benefit from acute skilled OT services to address these deficits and reach maximum level of function.       Plan:     Patient to be seen 6 x/week to address the above listed problems via self-care/home management, therapeutic activities, therapeutic exercises, neuromuscular re-education  · Plan of Care Expires: 06/17/22  · Plan of Care Reviewed with: patient    Subjective     Pain/Comfort:  Pain Rating 1:  (Pt did not rate pain)  Location - Side 1: Bilateral  Location - Orientation 1: generalized  Location 1: back    Objective:     Communicated with: Nurse, PT, OT prior to session.  Patient found supine with telemetry, PureWick upon OT entry to room.    General Precautions: Standard, fall   Orthopedic Precautions:N/A   Braces: N/A  Respiratory Status: Room air     Occupational Performance:     Bed Mobility:    · Patient completed Rolling/Turning to Left with  moderate assistance and maximal assistance  · Patient completed Rolling/Turning to Right with moderate assistance  · Patient completed Scooting/Bridging with dependent     Activities of Daily Living:  · Toileting: dependence      Treatment & Education:  Pt alert and eager to participate in treatment this day stating, "It is feeling better so I'm ready to see what more I can do." Pt participating in bed mobility of rolling side to side with " "continuous repetitive verbal cueing for positioning of feet to assist with rolling as well as hand placement on railings. Pt reporting no pain during bed mobility with ROBERT. PT entering session to assist with maximizing pt potential to sit EOB. Pt transitioned to R sidelying with tactile assistance required to maintain. Pt with BLEs off side of bed and R sidelying holding onto bed rail. ROBERT on L side of bed noting dark spot on back with drainage. Draw sheet under pt wet but not pad under pt. Nurse notified and attending to room. Pt refusing to attempt to transition to sitting EOB this day and remained in R sidelying position. Nurse, Eulalia, taking picture of spot and nurse tech entering with new bed linen. Pt requiring assistance of nurse, ROBERT and PT to roll side to side and change bed linens. Pt newly complaining of leg pain when transitioning side to side with nurse and nurse tech present. Pt rolled side to side and did not assist when changing bed linens. Pt c/o positioning of purewick reporting it was not in the right position. Purewick was functioning at the time and nurse reporting it was in the right place. Pt positioned higher in bed at total dependence today as pt did not attempt to assist at all; nurse, nurse tech, PT, and ROBERT assisting pt. Pt requesting nurse tech to tuck sheets, and PT to untuck hair. Pt requesting ROBERT and PT to position pillow under head and required total assistance as pt did not attempt to help self position. Pt lunch positioned and pt stating, "I might pick at it". Pt educated on importance of nutrition for rehabilitation potential. Pt educated to utilize call light when assistance is needed.     Patient left supine with all lines intact, call button in reach and nurse notifiedEducation:      GOALS:   Multidisciplinary Problems     Occupational Therapy Goals        Problem: Occupational Therapy    Goal Priority Disciplines Outcome Interventions   Occupational Therapy Goal     OT, " PT/OT Ongoing, Progressing    Description: Goals to be met by: discharge    Patient will increase functional independence with ADLs by performing:    UE Dressing with Minimal Assistance.  LE Dressing with Minimal Assistance.  Toileting from toilet with Minimal Assistance for hygiene and clothing management.   Sitting at edge of bed x10 minutes with Minimal Assistance.  Supine to sit with Minimal Assistance.                     Time Tracking:     OT Date of Treatment: 06/06/22  OT Start Time: 1120  OT Stop Time: 1200  OT Total Time (min): 40 min    Billable Minutes:Self Care/Home Management 25  Therapeutic Activity 15    OT/CARI: CARI ALCALA Visit Number: 2    6/6/2022

## 2022-06-06 NOTE — PROGRESS NOTES
[facility] Medicine  Progress Note    Patient Name: Aurelia Ruggiero  MRN: [id number]  Patient Class: IP- Inpatient   Admission Date: 6/1/2022  Length of Stay: 5 days  Attending Physician: Marimar Kellogg MD  Primary Care Provider: Marimar Kellogg MD        Subjective:     Principal Problem:Sepsis        HPI:  67-year-old female presents to the ED via EMS with reports of worsening lower back pain.  Patient denies any falls or trauma reports that pain starts and left lower back and radiates down to left leg.  Patient reports she has a history of arthritis but denies any surgeries or procedures on back.  EMS reports patient was noted to be O2 sat on room air of 88%.  Patient has a history of AFib but reports not taking medications this morning.  Patient's PCP is Dr. Kellogg       Overview/Hospital Course:  6/3 ND pt more peppy this am - still havign lower back and side pain.   Informed pt and son of her sepsis dx and need for iv abxs.  Breathing is better per pt.  6/4 WC:  Tolerating treatment well thus far.  Still very fatigued.   6/5 WC:  Surveillance cultures negative thus far.  Patient remains fatigued with diffuse pain.  6/6 SD: Back pain improved, moving more with less pain - continue therapy efforts. Repeat blood cultures in process. Continue abx therapy (day 5).      Interval History: Pt seen and evaluated    Review of Systems   Constitutional:  Positive for activity change, chills and fatigue. Negative for fever.   HENT:  Negative for sinus pressure and sinus pain.    Respiratory:  Negative for cough, shortness of breath and wheezing.    Cardiovascular:  Negative for chest pain, palpitations and leg swelling.   Gastrointestinal:  Negative for abdominal pain, blood in stool, constipation, diarrhea, nausea and vomiting.   Musculoskeletal:  Positive for arthralgias, back pain and gait problem.   Skin:  Negative for wound.   Neurological:  Positive for weakness. Negative for seizures and  syncope.   Objective:     Vital Signs (Most Recent):  Temp: 97.9 °F (36.6 °C) (06/06/22 0718)  Pulse: 93 (06/06/22 0854)  Resp: 18 (06/06/22 0859)  BP: 122/66 (06/06/22 0854)  SpO2: (!) 93 % (06/06/22 0733)   Vital Signs (24h Range):  Temp:  [97.9 °F (36.6 °C)-98.5 °F (36.9 °C)] 97.9 °F (36.6 °C)  Pulse:  [75-98] 93  Resp:  [17-21] 18  SpO2:  [91 %-94 %] 93 %  BP: (102-122)/(59-78) 122/66     Weight: 136.1 kg (300 lb)  Body mass index is 51.49 kg/m².    Intake/Output Summary (Last 24 hours) at 6/6/2022 0920  Last data filed at 6/5/2022 1800  Gross per 24 hour   Intake 2900.1 ml   Output 1250 ml   Net 1650.1 ml      Physical Exam  Constitutional:       Appearance: She is obese. She is ill-appearing.   HENT:      Head: Normocephalic.      Nose: Nose normal.      Mouth/Throat:      Mouth: Mucous membranes are moist.   Eyes:      Extraocular Movements: Extraocular movements intact.      Pupils: Pupils are equal, round, and reactive to light.   Cardiovascular:      Rate and Rhythm: Normal rate and regular rhythm.   Pulmonary:      Effort: Pulmonary effort is normal.      Breath sounds: Normal breath sounds.   Abdominal:      General: Bowel sounds are normal. There is no distension.      Palpations: Abdomen is soft.   Musculoskeletal:      Cervical back: Normal range of motion.   Skin:     General: Skin is warm and dry.      Comments: 1+ pedal pulse on left; 2 + pedal pulse on right   Neurological:      Mental Status: She is alert and oriented to person, place, and time.       Significant Labs: All pertinent labs within the past 24 hours have been reviewed.    Blood Culture:   Recent Labs   Lab 06/04/22  1255 06/04/22  1301   LABBLOO No Growth to date  No Growth to date No Growth to date  No Growth to date     CBC:   Recent Labs   Lab 06/05/22  0337 06/06/22  0338   WBC 7.62 7.18   HGB 11.3* 11.5*   HCT 33.8* 34.1*    256     CMP:   Recent Labs   Lab 06/05/22  0337 06/06/22  0338    137   K 4.0 4.2     "104   CO2 29 28    100   BUN 17 18   CREATININE 1.0 1.0   CALCIUM 8.2* 8.1*   PROT 7.0 7.1   ALBUMIN 2.2* 2.2*   BILITOT 1.0 0.8   ALKPHOS 107 107   AST 26 26   ALT 18 21   ANIONGAP 5* 5*   EGFRNONAA 57.6* 57.6*     Magnesium:   Recent Labs   Lab 06/05/22  0337 06/06/22  0338   MG 2.3 2.2       Significant Imaging: I have reviewed all pertinent imaging results/findings within the past 24 hours.      Assessment/Plan:      * Sepsis  This patient does have evidence of infective focus  My overall impression is sepsis. Vital signs were reviewed and noted in progress note.  Antibiotics given-   Antibiotics (From admission, onward)            Start     Stop Route Frequency Ordered    06/05/22 2100  vancomycin 750 mg in dextrose 5 % 250 mL IVPB (ready to mix system)         -- IV Every 12 hours (non-standard times) 06/05/22 2029 06/02/22 0537  vancomycin - pharmacy to dose  (vancomycin IVPB)        "And" Linked Group Details    -- IV pharmacy to manage frequency 06/02/22 0437    06/02/22 0500  cefTRIAXone (ROCEPHIN) 1 g/50 mL D5W IVPB         06/09 0459 IV Every 12 hours (non-standard times) 06/02/22 0437        Cultures were taken-   Microbiology Results (last 7 days)     Procedure Component Value Units Date/Time    Blood culture [[id number]] Collected: 06/04/22 1255    Order Status: Completed Specimen: Blood Updated: 06/05/22 2212     Blood Culture, Routine No Growth to date      No Growth to date    Blood culture [[id number]] Collected: 06/04/22 1301    Order Status: Completed Specimen: Blood Updated: 06/05/22 2212     Blood Culture, Routine No Growth to date      No Growth to date    Blood culture x two cultures. Draw prior to antibiotics. [[id number]]  (Abnormal) Collected: 06/01/22 1019    Order Status: Completed Specimen: Blood Updated: 06/04/22 1251     Blood Culture, Routine Gram stain aer bottle: Gram positive cocci in chains resembling Strep       Gram stain nita bottle: Gram positive cocci in chains " resembling Strep       Positive results previously called 06/02/2022  05:14      ENTEROCOCCUS FAECALIS  For susceptibility see order # [id number]      Narrative:      Aerobic and anaerobic    Blood culture x two cultures. Draw prior to antibiotics. [[id number]]  (Abnormal)  (Susceptibility) Collected: 06/01/22 1002    Order Status: Completed Specimen: Blood Updated: 06/04/22 1250     Blood Culture, Routine Gram stain nita bottle: Gram positive cocci in chains resembling Strep       Results called to and read back by: Karen Davis RN 06/02/2022        03:36      Gram stain aer bottle: Gram positive cocci in chains resembling Strep       Positive results previously called 06/02/2022  05:15      ENTEROCOCCUS FAECALIS    Narrative:      Aerobic and anaerobic        Latest lactate reviewed, they are-  No results for input(s): LACTATE in the last 72 hours.    Organ dysfunction indicated by Acute respiratory failure  Source- uti, bacteremia    Source control Achieved by- iv abxs  6/3 cont iv abxs- follow sensitivites  6/4:  Awaiting sensitivities.  Draw surveillance cultures.   6/5:  Sensitive to vancomycin.  Surveillance cultures negative thus far.  6/6: Continue abx therapy with Rocephin and Vancomycin (day 5). Monitoring repeat blood cultures    Myopathy  Continue therapy efforts.      Hypokalemia  Improved. Monitor daily labs and adjust repletion as needed.    Recent Labs   Lab 06/05/22  0337 06/06/22  0338   K 4.0 4.2         Abdominal pain  Ct of abd/pelvis - was negative      Longstanding persistent atrial fibrillation  Patient with Persistent (7 days or more) atrial fibrillation which is controlled currently with Beta Blocker and Amiodarone. Patient is currently in atrial fibrillation.[id number] Score: 2. Anticoagulation indicated. Anticoagulation done with eliquis.  Cards helpign due to recent rvr afib and recent pulm edema      Lumbar disc disease with radiculopathy  Pt with severe back pain - ct of back shows  no fractures but severe degenerative dz  Continue therapy efforts      Acute pulmonary edema  Resolved =- cards chagned to aldactone         VTE Risk Mitigation (From admission, onward)         Ordered     apixaban tablet 5 mg  2 times daily         06/02/22 1742     IP VTE HIGH RISK PATIENT  Once         06/01/22 1440     Place sequential compression device  Until discontinued         06/01/22 1440                Discharge Planning   PARISH:      Code Status: Full Code   Is the patient medically ready for discharge?:     Reason for patient still in hospital (select all that apply): Patient trending condition, Laboratory test and Treatment  Discharge Plan A: Home, Home Health                  Marizol Hernández NP  Department of Hospital Medicine   [facility] Surg

## 2022-06-06 NOTE — NURSING
Patient to side of bed with physical therapy, redness noted to right side of upper back and area noted between folds Dr notified

## 2022-06-06 NOTE — ASSESSMENT & PLAN NOTE
Improved. Monitor daily labs and adjust repletion as needed.    Recent Labs   Lab 06/05/22  0337 06/06/22  0338   K 4.0 4.2

## 2022-06-06 NOTE — ASSESSMENT & PLAN NOTE
Pt with severe back pain - ct of back shows no fractures but severe degenerative dz  Continue therapy efforts

## 2022-06-06 NOTE — SUBJECTIVE & OBJECTIVE
Interval History: Pt seen and evaluated    Review of Systems   Constitutional:  Positive for activity change, chills and fatigue. Negative for fever.   HENT:  Negative for sinus pressure and sinus pain.    Respiratory:  Negative for cough, shortness of breath and wheezing.    Cardiovascular:  Negative for chest pain, palpitations and leg swelling.   Gastrointestinal:  Negative for abdominal pain, blood in stool, constipation, diarrhea, nausea and vomiting.   Musculoskeletal:  Positive for arthralgias, back pain and gait problem.   Skin:  Negative for wound.   Neurological:  Positive for weakness. Negative for seizures and syncope.   Objective:     Vital Signs (Most Recent):  Temp: 97.9 °F (36.6 °C) (06/06/22 0718)  Pulse: 93 (06/06/22 0854)  Resp: 18 (06/06/22 0859)  BP: 122/66 (06/06/22 0854)  SpO2: (!) 93 % (06/06/22 0733)   Vital Signs (24h Range):  Temp:  [97.9 °F (36.6 °C)-98.5 °F (36.9 °C)] 97.9 °F (36.6 °C)  Pulse:  [75-98] 93  Resp:  [17-21] 18  SpO2:  [91 %-94 %] 93 %  BP: (102-122)/(59-78) 122/66     Weight: 136.1 kg (300 lb)  Body mass index is 51.49 kg/m².    Intake/Output Summary (Last 24 hours) at 6/6/2022 0920  Last data filed at 6/5/2022 1800  Gross per 24 hour   Intake 2900.1 ml   Output 1250 ml   Net 1650.1 ml      Physical Exam  Constitutional:       Appearance: She is obese. She is ill-appearing.   HENT:      Head: Normocephalic.      Nose: Nose normal.      Mouth/Throat:      Mouth: Mucous membranes are moist.   Eyes:      Extraocular Movements: Extraocular movements intact.      Pupils: Pupils are equal, round, and reactive to light.   Cardiovascular:      Rate and Rhythm: Normal rate and regular rhythm.   Pulmonary:      Effort: Pulmonary effort is normal.      Breath sounds: Normal breath sounds.   Abdominal:      General: Bowel sounds are normal. There is no distension.      Palpations: Abdomen is soft.   Musculoskeletal:      Cervical back: Normal range of motion.   Skin:     General: Skin  is warm and dry.      Comments: 1+ pedal pulse on left; 2 + pedal pulse on right   Neurological:      Mental Status: She is alert and oriented to person, place, and time.       Significant Labs: All pertinent labs within the past 24 hours have been reviewed.    Blood Culture:   Recent Labs   Lab 06/04/22  1255 06/04/22  1301   LABBLOO No Growth to date  No Growth to date No Growth to date  No Growth to date     CBC:   Recent Labs   Lab 06/05/22  0337 06/06/22  0338   WBC 7.62 7.18   HGB 11.3* 11.5*   HCT 33.8* 34.1*    256     CMP:   Recent Labs   Lab 06/05/22  0337 06/06/22  0338    137   K 4.0 4.2    104   CO2 29 28    100   BUN 17 18   CREATININE 1.0 1.0   CALCIUM 8.2* 8.1*   PROT 7.0 7.1   ALBUMIN 2.2* 2.2*   BILITOT 1.0 0.8   ALKPHOS 107 107   AST 26 26   ALT 18 21   ANIONGAP 5* 5*   EGFRNONAA 57.6* 57.6*     Magnesium:   Recent Labs   Lab 06/05/22  0337 06/06/22  0338   MG 2.3 2.2       Significant Imaging: I have reviewed all pertinent imaging results/findings within the past 24 hours.

## 2022-06-06 NOTE — PT/OT/SLP PROGRESS
"Physical Therapy  Treatment    Aurelia Ruggiero   MRN: [id number]   Admitting Diagnosis: Sepsis                          Billable Minutes:  Therapeutic Activity 25 minutes                     General Precautions: Standard,  fall  Respiratory Status: Room air         Subjective:  Communicated with nurse prior to and during session.    Pain: FACES scale 0/10 at rest pt states "That Dilaudid works!" ; 6-8/10 with movement         Objective:    pt is alert and states she feels like she can do more today; entered room after ROBERT had been working with pt on bed mobility.    Functional Mobility:     -pt requested using buttons on bed to attempt to come to long sitting then scoot to edge of bed to avoid excessive pain. She was able to elevated HOB higher today and then scoot her legs very slowly off the bed and then required max assist to elevate shoulders off bed but was not able to sit fully upright; she made it about 60-70% to upright sitting with legs off bed then reported increased pain and requested not to sit up more. ROBERT standing in posterior noted dark area with drainage on pt's Lower back; nurse to room and took pictures. Noted drainage and/or urine on bedsheets; assisted CNA in changing bed; max assist rolling L/R slowly to changed linens.    Patient left HOB elevated as lang with call button in reach and lunch tray set up.    Assessment:  Aurelia Ruggiero is a 67 y.o. female with a medical diagnosis of Sepsis and presents with continues to have reported lower back  Pain limiting mobility; pt reports feeling better and feels she is improving with being able to raise HOB however active mobility is still severely limited    Rehab identified problem list/impairments:      Rehab potential is poor.    Activity tolerance: Poor    Discharge recommendations:   due to severity of pain and pending progress; may require SNF as she is unable currently to lang 3 hours therapy    Barriers to discharge:  pain    Equipment " recommendations:       GOALS:   Multidisciplinary Problems     Physical Therapy Goals        Problem: Physical Therapy    Goal Priority Disciplines Outcome Goal Variances Interventions   Physical Therapy Goal     PT, PT/OT      Description: Goals to be met by: 22    Patient will increase functional independence with mobility by performin. Supine to sit with Moderate Assistance  2. Sit to supine with Moderate Assistance  3. Rolling to Left and Right with Moderate Assistance.  4. Sitting at edge of bed x10 minutes with Minimal Assistance                     PLAN:    Cont efforts to improve mobility       2022

## 2022-06-07 PROBLEM — R21 RASH: Status: ACTIVE | Noted: 2022-06-07

## 2022-06-07 LAB
ALBUMIN SERPL BCP-MCNC: 2.1 G/DL (ref 3.5–5.2)
ALP SERPL-CCNC: 100 U/L (ref 55–135)
ALT SERPL W/O P-5'-P-CCNC: 19 U/L (ref 10–44)
ANION GAP SERPL CALC-SCNC: 3 MMOL/L (ref 8–16)
AST SERPL-CCNC: 22 U/L (ref 10–40)
BASOPHILS # BLD AUTO: 0.1 K/UL (ref 0–0.2)
BASOPHILS NFR BLD: 1.3 % (ref 0–1.9)
BILIRUB SERPL-MCNC: 0.6 MG/DL (ref 0.1–1)
BUN SERPL-MCNC: 16 MG/DL (ref 8–23)
CALCIUM SERPL-MCNC: 8.5 MG/DL (ref 8.7–10.5)
CHLORIDE SERPL-SCNC: 105 MMOL/L (ref 95–110)
CO2 SERPL-SCNC: 29 MMOL/L (ref 23–29)
CREAT SERPL-MCNC: 0.8 MG/DL (ref 0.5–1.4)
CRP SERPL-MCNC: 9.8 MG/DL (ref 0–0.75)
DIFFERENTIAL METHOD: ABNORMAL
EOSINOPHIL # BLD AUTO: 0.3 K/UL (ref 0–0.5)
EOSINOPHIL NFR BLD: 4.1 % (ref 0–8)
ERYTHROCYTE [DISTWIDTH] IN BLOOD BY AUTOMATED COUNT: 14.3 % (ref 11.5–14.5)
ERYTHROCYTE [SEDIMENTATION RATE] IN BLOOD: 85 MM/HR (ref 0–20)
EST. GFR  (AFRICAN AMERICAN): >60 ML/MIN/1.73 M^2
EST. GFR  (NON AFRICAN AMERICAN): >60 ML/MIN/1.73 M^2
GLUCOSE SERPL-MCNC: 96 MG/DL (ref 70–110)
HCT VFR BLD AUTO: 35.6 % (ref 37–48.5)
HGB BLD-MCNC: 11.7 G/DL (ref 12–16)
IMM GRANULOCYTES # BLD AUTO: 0.09 K/UL (ref 0–0.04)
IMM GRANULOCYTES NFR BLD AUTO: 1.1 % (ref 0–0.5)
LYMPHOCYTES # BLD AUTO: 1.8 K/UL (ref 1–4.8)
LYMPHOCYTES NFR BLD: 23.4 % (ref 18–48)
MAGNESIUM SERPL-MCNC: 2.4 MG/DL (ref 1.6–2.6)
MCH RBC QN AUTO: 30.9 PG (ref 27–31)
MCHC RBC AUTO-ENTMCNC: 32.9 G/DL (ref 32–36)
MCV RBC AUTO: 94 FL (ref 82–98)
MONOCYTES # BLD AUTO: 1.3 K/UL (ref 0.3–1)
MONOCYTES NFR BLD: 16.9 % (ref 4–15)
NEUTROPHILS # BLD AUTO: 4.2 K/UL (ref 1.8–7.7)
NEUTROPHILS NFR BLD: 53.2 % (ref 38–73)
NRBC BLD-RTO: 0 /100 WBC
PLATELET # BLD AUTO: 284 K/UL (ref 150–450)
PMV BLD AUTO: 10.5 FL (ref 9.2–12.9)
POTASSIUM SERPL-SCNC: 4 MMOL/L (ref 3.5–5.1)
PROT SERPL-MCNC: 6.9 G/DL (ref 6–8.4)
RBC # BLD AUTO: 3.79 M/UL (ref 4–5.4)
SODIUM SERPL-SCNC: 137 MMOL/L (ref 136–145)
VANCOMYCIN TROUGH SERPL-MCNC: 22.9 UG/ML (ref 10–22)
WBC # BLD AUTO: 7.87 K/UL (ref 3.9–12.7)

## 2022-06-07 PROCEDURE — 94761 N-INVAS EAR/PLS OXIMETRY MLT: CPT

## 2022-06-07 PROCEDURE — 63600175 PHARM REV CODE 636 W HCPCS: Performed by: INTERNAL MEDICINE

## 2022-06-07 PROCEDURE — 25000003 PHARM REV CODE 250: Performed by: NURSE PRACTITIONER

## 2022-06-07 PROCEDURE — 63700000 PHARM REV CODE 250 ALT 637 W/O HCPCS: Performed by: INTERNAL MEDICINE

## 2022-06-07 PROCEDURE — 86140 C-REACTIVE PROTEIN: CPT | Performed by: INTERNAL MEDICINE

## 2022-06-07 PROCEDURE — 25000003 PHARM REV CODE 250: Performed by: INTERNAL MEDICINE

## 2022-06-07 PROCEDURE — 85025 COMPLETE CBC W/AUTO DIFF WBC: CPT | Performed by: INTERNAL MEDICINE

## 2022-06-07 PROCEDURE — 83735 ASSAY OF MAGNESIUM: CPT | Performed by: INTERNAL MEDICINE

## 2022-06-07 PROCEDURE — 25000003 PHARM REV CODE 250: Performed by: FAMILY MEDICINE

## 2022-06-07 PROCEDURE — 80053 COMPREHEN METABOLIC PANEL: CPT | Performed by: INTERNAL MEDICINE

## 2022-06-07 PROCEDURE — 85651 RBC SED RATE NONAUTOMATED: CPT | Performed by: INTERNAL MEDICINE

## 2022-06-07 PROCEDURE — 80202 ASSAY OF VANCOMYCIN: CPT | Performed by: INTERNAL MEDICINE

## 2022-06-07 PROCEDURE — 97530 THERAPEUTIC ACTIVITIES: CPT

## 2022-06-07 PROCEDURE — 36415 COLL VENOUS BLD VENIPUNCTURE: CPT | Performed by: INTERNAL MEDICINE

## 2022-06-07 PROCEDURE — 99900031 HC PATIENT EDUCATION (STAT)

## 2022-06-07 PROCEDURE — 11000001 HC ACUTE MED/SURG PRIVATE ROOM

## 2022-06-07 PROCEDURE — 63600175 PHARM REV CODE 636 W HCPCS: Performed by: NURSE PRACTITIONER

## 2022-06-07 PROCEDURE — 99900035 HC TECH TIME PER 15 MIN (STAT)

## 2022-06-07 RX ORDER — DIPHENHYDRAMINE HCL 25 MG
25 CAPSULE ORAL EVERY 6 HOURS PRN
Status: DISCONTINUED | OUTPATIENT
Start: 2022-06-07 | End: 2022-06-14 | Stop reason: HOSPADM

## 2022-06-07 RX ORDER — ELECTROLYTES/DEXTROSE
SOLUTION, ORAL ORAL 3 TIMES DAILY PRN
Status: DISCONTINUED | OUTPATIENT
Start: 2022-06-07 | End: 2022-06-14 | Stop reason: HOSPADM

## 2022-06-07 RX ADMIN — HYDROMORPHONE HYDROCHLORIDE 1 MG: 1 INJECTION, SOLUTION INTRAMUSCULAR; INTRAVENOUS; SUBCUTANEOUS at 03:06

## 2022-06-07 RX ADMIN — LEVOTHYROXINE SODIUM 150 MCG: 150 TABLET ORAL at 05:06

## 2022-06-07 RX ADMIN — CEFTRIAXONE SODIUM 1 G: 1 INJECTION, POWDER, FOR SOLUTION INTRAMUSCULAR; INTRAVENOUS at 05:06

## 2022-06-07 RX ADMIN — DIPHENHYDRAMINE HYDROCHLORIDE 25 MG: 25 CAPSULE ORAL at 10:06

## 2022-06-07 RX ADMIN — AMPICILLIN 2 G: 2 INJECTION, POWDER, FOR SOLUTION INTRAMUSCULAR; INTRAVENOUS at 10:06

## 2022-06-07 RX ADMIN — ALPRAZOLAM 0.5 MG: 0.5 TABLET ORAL at 08:06

## 2022-06-07 RX ADMIN — METOPROLOL TARTRATE 50 MG: 50 TABLET, FILM COATED ORAL at 08:06

## 2022-06-07 RX ADMIN — APIXABAN 5 MG: 5 TABLET, FILM COATED ORAL at 08:06

## 2022-06-07 RX ADMIN — HYDROCORTISONE: 0.01 CREAM TOPICAL at 10:06

## 2022-06-07 RX ADMIN — HYDROMORPHONE HYDROCHLORIDE 1 MG: 1 INJECTION, SOLUTION INTRAMUSCULAR; INTRAVENOUS; SUBCUTANEOUS at 08:06

## 2022-06-07 RX ADMIN — PANTOPRAZOLE SODIUM 40 MG: 40 TABLET, DELAYED RELEASE ORAL at 08:06

## 2022-06-07 RX ADMIN — AMIODARONE HYDROCHLORIDE 100 MG: 100 TABLET ORAL at 08:06

## 2022-06-07 RX ADMIN — AMPICILLIN 2 G: 2 INJECTION, POWDER, FOR SOLUTION INTRAMUSCULAR; INTRAVENOUS at 05:06

## 2022-06-07 RX ADMIN — AMPICILLIN 2 G: 2 INJECTION, POWDER, FOR SOLUTION INTRAMUSCULAR; INTRAVENOUS at 01:06

## 2022-06-07 RX ADMIN — HYDROMORPHONE HYDROCHLORIDE 1 MG: 1 INJECTION, SOLUTION INTRAMUSCULAR; INTRAVENOUS; SUBCUTANEOUS at 09:06

## 2022-06-07 RX ADMIN — SPIRONOLACTONE 12.5 MG: 25 TABLET, FILM COATED ORAL at 08:06

## 2022-06-07 RX ADMIN — GABAPENTIN 300 MG: 300 CAPSULE ORAL at 08:06

## 2022-06-07 RX ADMIN — FLUCONAZOLE 200 MG: 100 TABLET ORAL at 08:06

## 2022-06-07 RX ADMIN — AMPICILLIN 2 G: 2 INJECTION, POWDER, FOR SOLUTION INTRAMUSCULAR; INTRAVENOUS at 08:06

## 2022-06-07 RX ADMIN — HYDROCORTISONE: 0.01 CREAM TOPICAL at 08:06

## 2022-06-07 NOTE — PT/OT/SLP PROGRESS
Physical Therapy  Treatment    Aurelia Ruggiero   MRN: [id number]   Admitting Diagnosis: Sepsis                          Billable Minutes:  Therapeutic Activity 23 minutes                     General Precautions: Standard,  fall  Respiratory Status: Room air         Subjective:  Communicated with nurse prior to session who states pt participated better with bed mobility for bed bath    Pain: 8/10 FACES scale with attempt to sit; 0/10 at rest    Pt feels she is making progress as she can roll and remain on her side.       Objective:        Functional Mobility:  Bed Mobility:    -pt attempts to roll very slowly and requires mod assist  -pt elevated HOB using buttons and slowly slides her feet off bed but then requires max assist x 2 to partially elevate trunk from bed before she throws trunk posterior across bed in very awkward position that appears would be more painful than sitting up but she states she cannot sit up due to pain across low back. Total assist x 2-3 to place her back in supine to avoid pt sliding off bed and/or staff injury to back trying to hold pt up.  - in supine and in trendelenburg, pt able to bend her knees use her UE's and pull herself up in bed with very minimal assist.    Balance: unable to get pt upright in sitting      Patient left HOB elevated with all lines intact.    Assessment:  Aurelia Ruggiero is a 67 y.o. female with a medical diagnosis of Sepsis and presents with continues to have pain that she reports limits her from sitting; unable to get pt upright despite max-total assist effort of 3 people risking pt and staff injury due to pt throwing trunk back with attempt to sit..    Rehab identified problem list/impairments:      Rehab potential is poor.    Activity tolerance: Poor    Discharge recommendations:   recommend SNF due to inability to lang sitting; lives alone and is currently max-total assist of 2-3 people to attempt unsuccessful mobilization due to back pain.    Barriers to discharge:   pt lives alone and cannot take care of herself; she also cannot tolerate 3 hour requirement for inpt rehab due to severe pain in back/inability to sit upright for any amount of time    Equipment recommendations:       GOALS:   Multidisciplinary Problems     Physical Therapy Goals        Problem: Physical Therapy    Goal Priority Disciplines Outcome Goal Variances Interventions   Physical Therapy Goal     PT, PT/OT      Description: Goals to be met by: 22    Patient will increase functional independence with mobility by performin. Supine to sit with Moderate Assistance  2. Sit to supine with Moderate Assistance  3. Rolling to Left and Right with Moderate Assistance.  4. Sitting at edge of bed x10 minutes with Minimal Assistance                     PLAN:    Cont efforts to mobilize pt.       2022

## 2022-06-07 NOTE — PROGRESS NOTES
[facility]  Progress Note    Patient Name: Aurelia Ruggiero  MRN: [id number]  Patient Class: IP- Inpatient   Admission Date: 6/1/2022  Length of Stay: 6 days  Attending Physician: Marimar Kellogg MD  Primary Care Provider: Marimar Kellogg MD        Subjective:     Principal Problem:Sepsis        HPI:  67-year-old female presents to the ED via EMS with reports of worsening lower back pain.  Patient denies any falls or trauma reports that pain starts and left lower back and radiates down to left leg.  Patient reports she has a history of arthritis but denies any surgeries or procedures on back.  EMS reports patient was noted to be O2 sat on room air of 88%.  Patient has a history of AFib but reports not taking medications this morning.  Patient's PCP is Dr. Kellogg       Overview/Hospital Course:  6/3 ND pt more peppy this am - still havign lower back and side pain.   Informed pt and son of her sepsis dx and need for iv abxs.  Breathing is better per pt.  6/4 WC:  Tolerating treatment well thus far.  Still very fatigued.   6/5 WC:  Surveillance cultures negative thus far.  Patient remains fatigued with diffuse pain.  6/6 SD: Back pain improved, moving more with less pain - continue therapy efforts. Repeat blood cultures in process. Continue abx therapy (day 5).  6/7 SD: Downgrade abx therapy to ampicillin. Rash to back - pt feels related to laundry detergent - Benadryl PRN.      Interval History: Pt seen and evaluated    Review of Systems   Constitutional:  Positive for activity change, chills and fatigue. Negative for fever.   HENT:  Negative for sinus pressure and sinus pain.    Respiratory:  Negative for cough, shortness of breath and wheezing.    Cardiovascular:  Negative for chest pain, palpitations and leg swelling.   Gastrointestinal:  Negative for abdominal pain, blood in stool, constipation, diarrhea, nausea and vomiting.   Musculoskeletal:  Positive for arthralgias, back pain  and gait problem.   Skin:  Positive for rash. Negative for wound.   Allergic/Immunologic: Positive for environmental allergies.   Neurological:  Positive for weakness. Negative for seizures and syncope.   Objective:     Vital Signs (Most Recent):  Temp: 98.6 °F (37 °C) (06/07/22 0714)  Pulse: 88 (06/07/22 0816)  Resp: 18 (06/07/22 0831)  BP: 129/66 (06/07/22 0816)  SpO2: (!) 92 % (06/07/22 0719)   Vital Signs (24h Range):  Temp:  [97.6 °F (36.4 °C)-98.6 °F (37 °C)] 98.6 °F (37 °C)  Pulse:  [] 88  Resp:  [18-20] 18  SpO2:  [89 %-92 %] 92 %  BP: (100-129)/(58-74) 129/66     Weight: 136.1 kg (300 lb)  Body mass index is 51.49 kg/m².    Intake/Output Summary (Last 24 hours) at 6/7/2022 0845  Last data filed at 6/7/2022 0600  Gross per 24 hour   Intake 3450 ml   Output 2300 ml   Net 1150 ml        Physical Exam  Constitutional:       Appearance: She is obese. She is ill-appearing.   HENT:      Head: Normocephalic.      Nose: Nose normal.      Mouth/Throat:      Mouth: Mucous membranes are moist.   Eyes:      Extraocular Movements: Extraocular movements intact.      Pupils: Pupils are equal, round, and reactive to light.   Cardiovascular:      Rate and Rhythm: Normal rate and regular rhythm.   Pulmonary:      Effort: Pulmonary effort is normal.      Breath sounds: Normal breath sounds.   Abdominal:      General: Bowel sounds are normal. There is no distension.      Palpations: Abdomen is soft.   Musculoskeletal:      Cervical back: Normal range of motion.   Skin:     General: Skin is warm and dry.      Findings: Lesion and rash present.   Neurological:      Mental Status: She is alert and oriented to person, place, and time.       Significant Labs: All pertinent labs within the past 24 hours have been reviewed.    CBC:   Recent Labs   Lab 06/06/22 0338 06/07/22  0333   WBC 7.18 7.87   HGB 11.5* 11.7*   HCT 34.1* 35.6*    284       CMP:   Recent Labs   Lab 06/06/22 0338 06/07/22  0333    137   K 4.2  4.0    105   CO2 28 29    96   BUN 18 16   CREATININE 1.0 0.8   CALCIUM 8.1* 8.5*   PROT 7.1 6.9   ALBUMIN 2.2* 2.1*   BILITOT 0.8 0.6   ALKPHOS 107 100   AST 26 22   ALT 21 19   ANIONGAP 5* 3*   EGFRNONAA 57.6* >60.0       Magnesium:   Recent Labs   Lab 06/06/22  0338 06/07/22  0333   MG 2.2 2.4         Significant Imaging: I have reviewed all pertinent imaging results/findings within the past 24 hours.      Assessment/Plan:      * Sepsis  This patient does have evidence of infective focus  My overall impression is sepsis. Vital signs were reviewed and noted in progress note.  Antibiotics given-   Antibiotics (From admission, onward)            Start     Stop Route Frequency Ordered    06/07/22 0945  ampicillin 2 g in sodium chloride 0.9 % 100 mL IVPB (ready to mix system)         -- IV Every 4 hours (non-standard times) 06/07/22 0838        Cultures were taken-   Microbiology Results (last 7 days)     Procedure Component Value Units Date/Time    Blood culture [[id number]] Collected: 06/04/22 1255    Order Status: Completed Specimen: Blood Updated: 06/06/22 2212     Blood Culture, Routine No Growth to date      No Growth to date      No Growth to date    Blood culture [[id number]] Collected: 06/04/22 1301    Order Status: Completed Specimen: Blood Updated: 06/06/22 2212     Blood Culture, Routine No Growth to date      No Growth to date      No Growth to date    Blood culture x two cultures. Draw prior to antibiotics. [[id number]]  (Abnormal) Collected: 06/01/22 1019    Order Status: Completed Specimen: Blood Updated: 06/04/22 1251     Blood Culture, Routine Gram stain aer bottle: Gram positive cocci in chains resembling Strep       Gram stain nita bottle: Gram positive cocci in chains resembling Strep       Positive results previously called 06/02/2022  05:14      ENTEROCOCCUS FAECALIS  For susceptibility see order # [id number]      Narrative:      Aerobic and anaerobic    Blood culture x two cultures.  Draw prior to antibiotics. [[id number]]  (Abnormal)  (Susceptibility) Collected: 06/01/22 1002    Order Status: Completed Specimen: Blood Updated: 06/04/22 1250     Blood Culture, Routine Gram stain nita bottle: Gram positive cocci in chains resembling Strep       Results called to and read back by: Karen Davis RN 06/02/2022        03:36      Gram stain aer bottle: Gram positive cocci in chains resembling Strep       Positive results previously called 06/02/2022  05:15      ENTEROCOCCUS FAECALIS    Narrative:      Aerobic and anaerobic        Latest lactate reviewed, they are-  No results for input(s): LACTATE in the last 72 hours.    Organ dysfunction indicated by Acute respiratory failure  Source- uti, bacteremia    Source control Achieved by- iv abxs  6/3 cont iv abxs- follow sensitivites  6/4:  Awaiting sensitivities.  Draw surveillance cultures.   6/5:  Sensitive to vancomycin.  Surveillance cultures negative thus far.  6/6: Continue abx therapy with Rocephin and Vancomycin (day 5). Monitoring repeat blood cultures  6/7: Downgrade abx therpy to Ampicillin - IV abx therapy day 6    Rash  Pt suspects R/T laundry detergent. Bendaryl PRN.      Myopathy  Continue therapy efforts.      Hypokalemia  Improved. Monitor daily labs and adjust repletion as needed.    Recent Labs   Lab 06/06/22  0338 06/07/22  0333   K 4.2 4.0         Abdominal pain  CT of abd/pelvis - was negative  Resolved      Longstanding persistent atrial fibrillation  Patient with Persistent (7 days or more) atrial fibrillation which is controlled currently with Beta Blocker and Amiodarone. Patient is currently in atrial fibrillation.[id number] Score: 2. Anticoagulation indicated. Anticoagulation done with eliquis.  Cards helpign due to recent rvr afib and recent pulm edema      Lumbar disc disease with radiculopathy  Pt with severe back pain - CT of back shows no fractures but severe degenerative dz  Continue therapy efforts      Acute pulmonary  edema  Resolved        VTE Risk Mitigation (From admission, onward)         Ordered     apixaban tablet 5 mg  2 times daily         06/02/22 1742     IP VTE HIGH RISK PATIENT  Once         06/01/22 1440     Place sequential compression device  Until discontinued         06/01/22 1440                Discharge Planning   PARISH:      Code Status: Full Code   Is the patient medically ready for discharge?:     Reason for patient still in hospital (select all that apply): Patient new problem, Patient trending condition, Laboratory test and Treatment  Discharge Plan A: Home, Home Health                  Marizol Hernández NP  Department of Hospital Medicine   [facility]

## 2022-06-07 NOTE — PT/OT/SLP PROGRESS
Occupational Therapy   Treatment    Name: Aurelia Ruggiero  MRN: [id number]  Admitting Diagnosis:  Sepsis       Recommendations:     Discharge Recommendations: nursing facility, skilled  Discharge Equipment Recommendations:  none  Barriers to discharge:  Other (Comment) (current medical status)    Assessment:     Aurelia Ruggiero is a 67 y.o. female with a medical diagnosis of Sepsis.  She presents with Weakness. Performance deficits affecting function are weakness, impaired endurance, impaired self care skills, impaired functional mobilty.     Rehab Prognosis:  Poor; patient would benefit from acute skilled OT services to address these deficits and reach maximum level of function.       Plan:     Patient to be seen 6 x/week to address the above listed problems via self-care/home management, therapeutic activities, therapeutic exercises, neuromuscular re-education  · Plan of Care Expires: 06/17/22  · Plan of Care Reviewed with: patient    Subjective     Pain/Comfort:  ·   Pain at a 8/10 while attempting to sit at EOB. Pt returned to supine to decrease pain.    Objective:     Communicated with: Nurse prior to session.  Patient found supine with PureWick, telemetry upon OT entry to room.    General Precautions: Standard, fall   Orthopedic Precautions:N/A   Braces: N/A  Respiratory Status: Room air     Occupational Performance:     Bed Mobility:    · Patient completed Rolling/Turning to Right with total assistance  · Patient completed Scooting/Bridging with total assistance  · Patient completed Supine to Sit with total assistance       [facility] 6 Click ADL: 14    Treatment & Education:  Pt was agreeable to therapy today with OT/PT. Pt attempted to preform bed mobility and completing supine-sit with total assistance. Pt was not able to sit at EOB as pt reporting pain to be too much to handle while throwing self back towards supine.    Patient left supine with all lines intact and call button in reachEducation:      GOALS:    Multidisciplinary Problems     Occupational Therapy Goals        Problem: Occupational Therapy    Goal Priority Disciplines Outcome Interventions   Occupational Therapy Goal     OT, PT/OT Ongoing, Not Progressing    Description: Goals to be met by: discharge    Patient will increase functional independence with ADLs by performing:    UE Dressing with Minimal Assistance.  LE Dressing with Minimal Assistance.  Toileting from toilet with Minimal Assistance for hygiene and clothing management.   Sitting at edge of bed x10 minutes with Minimal Assistance.  Supine to sit with Minimal Assistance.                     Time Tracking:     OT Date of Treatment: 06/07/22  OT Start Time: 1125  OT Stop Time: 1148  OT Total Time (min): 23 min    Billable Minutes:Therapeutic Activity 23    OT/CARI: OT     CARI Visit Number: 2    6/7/2022

## 2022-06-07 NOTE — ASSESSMENT & PLAN NOTE
Improved. Monitor daily labs and adjust repletion as needed.    Recent Labs   Lab 06/06/22  0338 06/07/22  0333   K 4.2 4.0

## 2022-06-07 NOTE — SUBJECTIVE & OBJECTIVE
Interval History: Pt seen and evaluated    Review of Systems   Constitutional:  Positive for activity change, chills and fatigue. Negative for fever.   HENT:  Negative for sinus pressure and sinus pain.    Respiratory:  Negative for cough, shortness of breath and wheezing.    Cardiovascular:  Negative for chest pain, palpitations and leg swelling.   Gastrointestinal:  Negative for abdominal pain, blood in stool, constipation, diarrhea, nausea and vomiting.   Musculoskeletal:  Positive for arthralgias, back pain and gait problem.   Skin:  Positive for rash. Negative for wound.   Allergic/Immunologic: Positive for environmental allergies.   Neurological:  Positive for weakness. Negative for seizures and syncope.   Objective:     Vital Signs (Most Recent):  Temp: 98.6 °F (37 °C) (06/07/22 0714)  Pulse: 88 (06/07/22 0816)  Resp: 18 (06/07/22 0831)  BP: 129/66 (06/07/22 0816)  SpO2: (!) 92 % (06/07/22 0719)   Vital Signs (24h Range):  Temp:  [97.6 °F (36.4 °C)-98.6 °F (37 °C)] 98.6 °F (37 °C)  Pulse:  [] 88  Resp:  [18-20] 18  SpO2:  [89 %-92 %] 92 %  BP: (100-129)/(58-74) 129/66     Weight: 136.1 kg (300 lb)  Body mass index is 51.49 kg/m².    Intake/Output Summary (Last 24 hours) at 6/7/2022 0845  Last data filed at 6/7/2022 0600  Gross per 24 hour   Intake 3450 ml   Output 2300 ml   Net 1150 ml        Physical Exam  Constitutional:       Appearance: She is obese. She is ill-appearing.   HENT:      Head: Normocephalic.      Nose: Nose normal.      Mouth/Throat:      Mouth: Mucous membranes are moist.   Eyes:      Extraocular Movements: Extraocular movements intact.      Pupils: Pupils are equal, round, and reactive to light.   Cardiovascular:      Rate and Rhythm: Normal rate and regular rhythm.   Pulmonary:      Effort: Pulmonary effort is normal.      Breath sounds: Normal breath sounds.   Abdominal:      General: Bowel sounds are normal. There is no distension.      Palpations: Abdomen is soft.    Musculoskeletal:      Cervical back: Normal range of motion.   Skin:     General: Skin is warm and dry.      Findings: Lesion and rash present.   Neurological:      Mental Status: She is alert and oriented to person, place, and time.       Significant Labs: All pertinent labs within the past 24 hours have been reviewed.    CBC:   Recent Labs   Lab 06/06/22 0338 06/07/22  0333   WBC 7.18 7.87   HGB 11.5* 11.7*   HCT 34.1* 35.6*    284       CMP:   Recent Labs   Lab 06/06/22 0338 06/07/22  0333    137   K 4.2 4.0    105   CO2 28 29    96   BUN 18 16   CREATININE 1.0 0.8   CALCIUM 8.1* 8.5*   PROT 7.1 6.9   ALBUMIN 2.2* 2.1*   BILITOT 0.8 0.6   ALKPHOS 107 100   AST 26 22   ALT 21 19   ANIONGAP 5* 3*   EGFRNONAA 57.6* >60.0       Magnesium:   Recent Labs   Lab 06/06/22 0338 06/07/22  0333   MG 2.2 2.4         Significant Imaging: I have reviewed all pertinent imaging results/findings within the past 24 hours.

## 2022-06-07 NOTE — ASSESSMENT & PLAN NOTE
This patient does have evidence of infective focus  My overall impression is sepsis. Vital signs were reviewed and noted in progress note.  Antibiotics given-   Antibiotics (From admission, onward)            Start     Stop Route Frequency Ordered    06/07/22 0945  ampicillin 2 g in sodium chloride 0.9 % 100 mL IVPB (ready to mix system)         -- IV Every 4 hours (non-standard times) 06/07/22 0838        Cultures were taken-   Microbiology Results (last 7 days)     Procedure Component Value Units Date/Time    Blood culture [[id number]] Collected: 06/04/22 1255    Order Status: Completed Specimen: Blood Updated: 06/06/22 2212     Blood Culture, Routine No Growth to date      No Growth to date      No Growth to date    Blood culture [[id number]] Collected: 06/04/22 1301    Order Status: Completed Specimen: Blood Updated: 06/06/22 2212     Blood Culture, Routine No Growth to date      No Growth to date      No Growth to date    Blood culture x two cultures. Draw prior to antibiotics. [[id number]]  (Abnormal) Collected: 06/01/22 1019    Order Status: Completed Specimen: Blood Updated: 06/04/22 1251     Blood Culture, Routine Gram stain aer bottle: Gram positive cocci in chains resembling Strep       Gram stain nita bottle: Gram positive cocci in chains resembling Strep       Positive results previously called 06/02/2022  05:14      ENTEROCOCCUS FAECALIS  For susceptibility see order # [id number]      Narrative:      Aerobic and anaerobic    Blood culture x two cultures. Draw prior to antibiotics. [[id number]]  (Abnormal)  (Susceptibility) Collected: 06/01/22 1002    Order Status: Completed Specimen: Blood Updated: 06/04/22 1250     Blood Culture, Routine Gram stain nita bottle: Gram positive cocci in chains resembling Strep       Results called to and read back by: Karen Davis RN 06/02/2022        03:36      Gram stain aer bottle: Gram positive cocci in chains resembling Strep       Positive results previously called  06/02/2022  05:15      ENTEROCOCCUS FAECALIS    Narrative:      Aerobic and anaerobic        Latest lactate reviewed, they are-  No results for input(s): LACTATE in the last 72 hours.    Organ dysfunction indicated by Acute respiratory failure  Source- uti, bacteremia    Source control Achieved by- iv abxs  6/3 cont iv abxs- follow sensitivites  6/4:  Awaiting sensitivities.  Draw surveillance cultures.   6/5:  Sensitive to vancomycin.  Surveillance cultures negative thus far.  6/6: Continue abx therapy with Rocephin and Vancomycin (day 5). Monitoring repeat blood cultures  6/7: Downgrade abx therpy to Ampicillin - IV abx therapy day 6

## 2022-06-07 NOTE — PLAN OF CARE
Pt. Complains of back pain this shift which is relieved by PRN medication as ordered, no respiratory distress noted, on room air, able to verbalize needs, plan of care reviewed with pt., pt. Complaining of itching to rash to back and right side this shift, MD aware and ordered hydrocortisone cream to start in am, this nurse provided ice pack to patient to help with itching, pt. Says it feels much better, IV antibiotics as ordered, takes meds whole, purewick in use, call bell in reach, instructed to call with needs/assistance, verbalizes understanding, will continue to monitor.

## 2022-06-07 NOTE — NURSING
Patient requesting Hydrocortisone cream for her rash to her back, Spoke with Dr. Alvarado, new order given for Hydrocortisone cream TID readback will continue to monitor.

## 2022-06-07 NOTE — ASSESSMENT & PLAN NOTE
Patient with Persistent (7 days or more) atrial fibrillation which is controlled currently with Beta Blocker and Amiodarone. Patient is currently in atrial fibrillation.[id number] Score: 2. Anticoagulation indicated. Anticoagulation done with eliquis.  Cards helpign due to recent rvr afib and recent pulm edema

## 2022-06-08 LAB
ALBUMIN SERPL BCP-MCNC: 2.2 G/DL (ref 3.5–5.2)
ALP SERPL-CCNC: 104 U/L (ref 55–135)
ALT SERPL W/O P-5'-P-CCNC: 19 U/L (ref 10–44)
ANION GAP SERPL CALC-SCNC: 7 MMOL/L (ref 8–16)
AST SERPL-CCNC: 20 U/L (ref 10–40)
BASOPHILS # BLD AUTO: 0.08 K/UL (ref 0–0.2)
BASOPHILS NFR BLD: 1 % (ref 0–1.9)
BILIRUB SERPL-MCNC: 0.5 MG/DL (ref 0.1–1)
BUN SERPL-MCNC: 16 MG/DL (ref 8–23)
CALCIUM SERPL-MCNC: 8.6 MG/DL (ref 8.7–10.5)
CHLORIDE SERPL-SCNC: 105 MMOL/L (ref 95–110)
CO2 SERPL-SCNC: 28 MMOL/L (ref 23–29)
CREAT SERPL-MCNC: 1 MG/DL (ref 0.5–1.4)
DIFFERENTIAL METHOD: ABNORMAL
EOSINOPHIL # BLD AUTO: 0.3 K/UL (ref 0–0.5)
EOSINOPHIL NFR BLD: 3.9 % (ref 0–8)
ERYTHROCYTE [DISTWIDTH] IN BLOOD BY AUTOMATED COUNT: 14.6 % (ref 11.5–14.5)
EST. GFR  (AFRICAN AMERICAN): >60 ML/MIN/1.73 M^2
EST. GFR  (NON AFRICAN AMERICAN): 57.6 ML/MIN/1.73 M^2
GLUCOSE SERPL-MCNC: 97 MG/DL (ref 70–110)
HCT VFR BLD AUTO: 36.2 % (ref 37–48.5)
HGB BLD-MCNC: 11.7 G/DL (ref 12–16)
IMM GRANULOCYTES # BLD AUTO: 0.1 K/UL (ref 0–0.04)
IMM GRANULOCYTES NFR BLD AUTO: 1.2 % (ref 0–0.5)
LYMPHOCYTES # BLD AUTO: 2.1 K/UL (ref 1–4.8)
LYMPHOCYTES NFR BLD: 25.2 % (ref 18–48)
MAGNESIUM SERPL-MCNC: 2.5 MG/DL (ref 1.6–2.6)
MCH RBC QN AUTO: 31 PG (ref 27–31)
MCHC RBC AUTO-ENTMCNC: 32.3 G/DL (ref 32–36)
MCV RBC AUTO: 96 FL (ref 82–98)
MONOCYTES # BLD AUTO: 1.2 K/UL (ref 0.3–1)
MONOCYTES NFR BLD: 14.7 % (ref 4–15)
NEUTROPHILS # BLD AUTO: 4.5 K/UL (ref 1.8–7.7)
NEUTROPHILS NFR BLD: 54 % (ref 38–73)
NRBC BLD-RTO: 0 /100 WBC
PLATELET # BLD AUTO: 311 K/UL (ref 150–450)
PMV BLD AUTO: 10.6 FL (ref 9.2–12.9)
POTASSIUM SERPL-SCNC: 4.2 MMOL/L (ref 3.5–5.1)
PROT SERPL-MCNC: 7.1 G/DL (ref 6–8.4)
RBC # BLD AUTO: 3.78 M/UL (ref 4–5.4)
SODIUM SERPL-SCNC: 140 MMOL/L (ref 136–145)
WBC # BLD AUTO: 8.3 K/UL (ref 3.9–12.7)

## 2022-06-08 PROCEDURE — 97530 THERAPEUTIC ACTIVITIES: CPT

## 2022-06-08 PROCEDURE — 99900035 HC TECH TIME PER 15 MIN (STAT)

## 2022-06-08 PROCEDURE — 11000001 HC ACUTE MED/SURG PRIVATE ROOM

## 2022-06-08 PROCEDURE — 25000003 PHARM REV CODE 250: Performed by: FAMILY MEDICINE

## 2022-06-08 PROCEDURE — 25000003 PHARM REV CODE 250: Performed by: NURSE PRACTITIONER

## 2022-06-08 PROCEDURE — 80053 COMPREHEN METABOLIC PANEL: CPT | Performed by: INTERNAL MEDICINE

## 2022-06-08 PROCEDURE — 27000221 HC OXYGEN, UP TO 24 HOURS

## 2022-06-08 PROCEDURE — 25000003 PHARM REV CODE 250: Performed by: INTERNAL MEDICINE

## 2022-06-08 PROCEDURE — 36415 COLL VENOUS BLD VENIPUNCTURE: CPT | Performed by: INTERNAL MEDICINE

## 2022-06-08 PROCEDURE — 99900031 HC PATIENT EDUCATION (STAT)

## 2022-06-08 PROCEDURE — 63600175 PHARM REV CODE 636 W HCPCS: Performed by: INTERNAL MEDICINE

## 2022-06-08 PROCEDURE — 63700000 PHARM REV CODE 250 ALT 637 W/O HCPCS: Performed by: INTERNAL MEDICINE

## 2022-06-08 PROCEDURE — 94761 N-INVAS EAR/PLS OXIMETRY MLT: CPT

## 2022-06-08 PROCEDURE — 85025 COMPLETE CBC W/AUTO DIFF WBC: CPT | Performed by: INTERNAL MEDICINE

## 2022-06-08 PROCEDURE — 83735 ASSAY OF MAGNESIUM: CPT | Performed by: INTERNAL MEDICINE

## 2022-06-08 PROCEDURE — 63600175 PHARM REV CODE 636 W HCPCS: Performed by: NURSE PRACTITIONER

## 2022-06-08 RX ORDER — HYDROMORPHONE HYDROCHLORIDE 2 MG/ML
2 INJECTION, SOLUTION INTRAMUSCULAR; INTRAVENOUS; SUBCUTANEOUS
Status: DISCONTINUED | OUTPATIENT
Start: 2022-06-08 | End: 2022-06-14 | Stop reason: HOSPADM

## 2022-06-08 RX ADMIN — PANTOPRAZOLE SODIUM 40 MG: 40 TABLET, DELAYED RELEASE ORAL at 09:06

## 2022-06-08 RX ADMIN — AMPICILLIN 2 G: 2 INJECTION, POWDER, FOR SOLUTION INTRAMUSCULAR; INTRAVENOUS at 01:06

## 2022-06-08 RX ADMIN — FLUCONAZOLE 200 MG: 100 TABLET ORAL at 09:06

## 2022-06-08 RX ADMIN — DIPHENHYDRAMINE HYDROCHLORIDE 25 MG: 25 CAPSULE ORAL at 01:06

## 2022-06-08 RX ADMIN — APIXABAN 5 MG: 5 TABLET, FILM COATED ORAL at 09:06

## 2022-06-08 RX ADMIN — HYDROMORPHONE HYDROCHLORIDE 2 MG: 2 INJECTION, SOLUTION INTRAMUSCULAR; INTRAVENOUS; SUBCUTANEOUS at 11:06

## 2022-06-08 RX ADMIN — HYDROMORPHONE HYDROCHLORIDE 2 MG: 2 INJECTION, SOLUTION INTRAMUSCULAR; INTRAVENOUS; SUBCUTANEOUS at 05:06

## 2022-06-08 RX ADMIN — SPIRONOLACTONE 12.5 MG: 25 TABLET, FILM COATED ORAL at 09:06

## 2022-06-08 RX ADMIN — ALPRAZOLAM 0.5 MG: 0.5 TABLET ORAL at 09:06

## 2022-06-08 RX ADMIN — METOPROLOL TARTRATE 50 MG: 50 TABLET, FILM COATED ORAL at 09:06

## 2022-06-08 RX ADMIN — AMPICILLIN 2 G: 2 INJECTION, POWDER, FOR SOLUTION INTRAMUSCULAR; INTRAVENOUS at 05:06

## 2022-06-08 RX ADMIN — LEVOTHYROXINE SODIUM 150 MCG: 150 TABLET ORAL at 05:06

## 2022-06-08 RX ADMIN — AMPICILLIN 2 G: 2 INJECTION, POWDER, FOR SOLUTION INTRAMUSCULAR; INTRAVENOUS at 09:06

## 2022-06-08 RX ADMIN — AMPICILLIN 2 G: 2 INJECTION, POWDER, FOR SOLUTION INTRAMUSCULAR; INTRAVENOUS at 12:06

## 2022-06-08 RX ADMIN — GABAPENTIN 300 MG: 300 CAPSULE ORAL at 09:06

## 2022-06-08 RX ADMIN — AMIODARONE HYDROCHLORIDE 100 MG: 100 TABLET ORAL at 09:06

## 2022-06-08 RX ADMIN — HYDROMORPHONE HYDROCHLORIDE 2 MG: 2 INJECTION, SOLUTION INTRAMUSCULAR; INTRAVENOUS; SUBCUTANEOUS at 12:06

## 2022-06-08 RX ADMIN — HYDROMORPHONE HYDROCHLORIDE 2 MG: 2 INJECTION, SOLUTION INTRAMUSCULAR; INTRAVENOUS; SUBCUTANEOUS at 09:06

## 2022-06-08 NOTE — ASSESSMENT & PLAN NOTE
This patient does have evidence of infective focus  My overall impression is sepsis. Vital signs were reviewed and noted in progress note.  Antibiotics given-   Antibiotics (From admission, onward)            Start     Stop Route Frequency Ordered    06/07/22 0945  ampicillin 2 g in sodium chloride 0.9 % 100 mL IVPB (ready to mix system)         -- IV Every 4 hours (non-standard times) 06/07/22 0838        Cultures were taken-   Microbiology Results (last 7 days)     Procedure Component Value Units Date/Time    Blood culture [[id number]] Collected: 06/04/22 1301    Order Status: Completed Specimen: Blood Updated: 06/07/22 2212     Blood Culture, Routine No Growth to date      No Growth to date      No Growth to date      No Growth to date    Blood culture [[id number]] Collected: 06/04/22 1255    Order Status: Completed Specimen: Blood Updated: 06/07/22 2212     Blood Culture, Routine No Growth to date      No Growth to date      No Growth to date      No Growth to date    Blood culture x two cultures. Draw prior to antibiotics. [[id number]]  (Abnormal) Collected: 06/01/22 1019    Order Status: Completed Specimen: Blood Updated: 06/04/22 1251     Blood Culture, Routine Gram stain aer bottle: Gram positive cocci in chains resembling Strep       Gram stain nita bottle: Gram positive cocci in chains resembling Strep       Positive results previously called 06/02/2022  05:14      ENTEROCOCCUS FAECALIS  For susceptibility see order # [id number]      Narrative:      Aerobic and anaerobic    Blood culture x two cultures. Draw prior to antibiotics. [[id number]]  (Abnormal)  (Susceptibility) Collected: 06/01/22 1002    Order Status: Completed Specimen: Blood Updated: 06/04/22 1250     Blood Culture, Routine Gram stain nita bottle: Gram positive cocci in chains resembling Strep       Results called to and read back by: Karen Davsi RN 06/02/2022        03:36      Gram stain aer bottle: Gram positive cocci in chains resembling  Strep       Positive results previously called 06/02/2022  05:15      ENTEROCOCCUS FAECALIS    Narrative:      Aerobic and anaerobic        Latest lactate reviewed, they are-  No results for input(s): LACTATE in the last 72 hours.    Organ dysfunction indicated by Acute respiratory failure  Source- uti, bacteremia    Source control Achieved by- iv abxs  6/3 cont iv abxs- follow sensitivites  6/4:  Awaiting sensitivities.  Draw surveillance cultures.   6/5:  Sensitive to vancomycin.  Surveillance cultures negative thus far.  6/6: Continue abx therapy with Rocephin and Vancomycin (day 5). Monitoring repeat blood cultures  6/7: Downgrade abx therpy to Ampicillin - IV abx therapy day 6  6/8: IV abx therapy day 7

## 2022-06-08 NOTE — PROGRESS NOTES
[facility] Medicine  Progress Note    Patient Name: Aurelia Ruggiero  MRN: [id number]  Patient Class: IP- Inpatient   Admission Date: 6/1/2022  Length of Stay: 7 days  Attending Physician: Marimar Kellogg MD  Primary Care Provider: Marimar Kellogg MD        Subjective:     Principal Problem:Sepsis        HPI:  67-year-old female presents to the ED via EMS with reports of worsening lower back pain.  Patient denies any falls or trauma reports that pain starts and left lower back and radiates down to left leg.  Patient reports she has a history of arthritis but denies any surgeries or procedures on back.  EMS reports patient was noted to be O2 sat on room air of 88%.  Patient has a history of AFib but reports not taking medications this morning.  Patient's PCP is Dr. Kellogg       Overview/Hospital Course:  6/3 ND pt more peppy this am - still havign lower back and side pain.   Informed pt and son of her sepsis dx and need for iv abxs.  Breathing is better per pt.  6/4 WC:  Tolerating treatment well thus far.  Still very fatigued.   6/5 WC:  Surveillance cultures negative thus far.  Patient remains fatigued with diffuse pain.  6/6 SD: Back pain improved, moving more with less pain - continue therapy efforts. Repeat blood cultures in process. Continue abx therapy (day 5).  6/7 SD: Downgrade abx therapy to ampicillin. Rash to back - pt feels related to laundry detergent - Benadryl PRN.  6/8 SD: Continue abx therapy. Rash improved. Continue therapy efforts.      Interval History: Pt seen and evaluated    Review of Systems   Constitutional:  Positive for activity change and fatigue. Negative for fever.   HENT:  Negative for sinus pressure and sinus pain.    Respiratory:  Negative for cough, shortness of breath and wheezing.    Cardiovascular:  Negative for chest pain, palpitations and leg swelling.   Gastrointestinal:  Negative for abdominal pain, blood in stool, constipation, diarrhea, nausea and  vomiting.   Musculoskeletal:  Positive for arthralgias, back pain and gait problem.   Skin:  Positive for rash. Negative for wound.   Allergic/Immunologic: Positive for environmental allergies.   Neurological:  Positive for weakness. Negative for seizures and syncope.   Objective:     Vital Signs (Most Recent):  Temp: 97.7 °F (36.5 °C) (06/08/22 0743)  Pulse: 79 (06/08/22 0743)  Resp: 18 (06/08/22 0932)  BP: 120/70 (06/08/22 0928)  SpO2: (!) 94 % (06/08/22 0743)   Vital Signs (24h Range):  Temp:  [97.3 °F (36.3 °C)-98.2 °F (36.8 °C)] 97.7 °F (36.5 °C)  Pulse:  [68-85] 79  Resp:  [16-20] 18  SpO2:  [90 %-94 %] 94 %  BP: ()/(52-70) 120/70     Weight: 136.1 kg (300 lb)  Body mass index is 51.49 kg/m².    Intake/Output Summary (Last 24 hours) at 6/8/2022 0942  Last data filed at 6/8/2022 0600  Gross per 24 hour   Intake 2180 ml   Output 1700 ml   Net 480 ml        Physical Exam  Constitutional:       Appearance: She is obese.   HENT:      Head: Normocephalic.      Nose: Nose normal.      Mouth/Throat:      Mouth: Mucous membranes are moist.   Eyes:      Extraocular Movements: Extraocular movements intact.      Pupils: Pupils are equal, round, and reactive to light.   Cardiovascular:      Rate and Rhythm: Normal rate and regular rhythm.   Pulmonary:      Effort: Pulmonary effort is normal.      Breath sounds: Normal breath sounds.   Abdominal:      General: Bowel sounds are normal. There is no distension.      Palpations: Abdomen is soft.   Musculoskeletal:      Cervical back: Normal range of motion.   Skin:     General: Skin is warm and dry.      Findings: Lesion and rash present.   Neurological:      Mental Status: She is alert and oriented to person, place, and time.       Significant Labs: All pertinent labs within the past 24 hours have been reviewed.    CBC:   Recent Labs   Lab 06/07/22  0333 06/08/22  0344   WBC 7.87 8.30   HGB 11.7* 11.7*   HCT 35.6* 36.2*    311       CMP:   Recent Labs   Lab  06/07/22  0333 06/08/22  0344    140   K 4.0 4.2    105   CO2 29 28   GLU 96 97   BUN 16 16   CREATININE 0.8 1.0   CALCIUM 8.5* 8.6*   PROT 6.9 7.1   ALBUMIN 2.1* 2.2*   BILITOT 0.6 0.5   ALKPHOS 100 104   AST 22 20   ALT 19 19   ANIONGAP 3* 7*   EGFRNONAA >60.0 57.6*       Magnesium:   Recent Labs   Lab 06/07/22  0333 06/08/22  0344   MG 2.4 2.5         Significant Imaging: I have reviewed all pertinent imaging results/findings within the past 24 hours.      Assessment/Plan:      * Sepsis  This patient does have evidence of infective focus  My overall impression is sepsis. Vital signs were reviewed and noted in progress note.  Antibiotics given-   Antibiotics (From admission, onward)            Start     Stop Route Frequency Ordered    06/07/22 0945  ampicillin 2 g in sodium chloride 0.9 % 100 mL IVPB (ready to mix system)         -- IV Every 4 hours (non-standard times) 06/07/22 0838        Cultures were taken-   Microbiology Results (last 7 days)     Procedure Component Value Units Date/Time    Blood culture [[id number]] Collected: 06/04/22 1301    Order Status: Completed Specimen: Blood Updated: 06/07/22 2212     Blood Culture, Routine No Growth to date      No Growth to date      No Growth to date      No Growth to date    Blood culture [[id number]] Collected: 06/04/22 1255    Order Status: Completed Specimen: Blood Updated: 06/07/22 2212     Blood Culture, Routine No Growth to date      No Growth to date      No Growth to date      No Growth to date    Blood culture x two cultures. Draw prior to antibiotics. [[id number]]  (Abnormal) Collected: 06/01/22 1019    Order Status: Completed Specimen: Blood Updated: 06/04/22 1251     Blood Culture, Routine Gram stain aer bottle: Gram positive cocci in chains resembling Strep       Gram stain nita bottle: Gram positive cocci in chains resembling Strep       Positive results previously called 06/02/2022  05:14      ENTEROCOCCUS FAECALIS  For susceptibility see  order # [id number]      Narrative:      Aerobic and anaerobic    Blood culture x two cultures. Draw prior to antibiotics. [[id number]]  (Abnormal)  (Susceptibility) Collected: 06/01/22 1002    Order Status: Completed Specimen: Blood Updated: 06/04/22 1250     Blood Culture, Routine Gram stain nita bottle: Gram positive cocci in chains resembling Strep       Results called to and read back by: Karen Davis RN 06/02/2022        03:36      Gram stain aer bottle: Gram positive cocci in chains resembling Strep       Positive results previously called 06/02/2022  05:15      ENTEROCOCCUS FAECALIS    Narrative:      Aerobic and anaerobic        Latest lactate reviewed, they are-  No results for input(s): LACTATE in the last 72 hours.    Organ dysfunction indicated by Acute respiratory failure  Source- uti, bacteremia    Source control Achieved by- iv abxs  6/3 cont iv abxs- follow sensitivites  6/4:  Awaiting sensitivities.  Draw surveillance cultures.   6/5:  Sensitive to vancomycin.  Surveillance cultures negative thus far.  6/6: Continue abx therapy with Rocephin and Vancomycin (day 5). Monitoring repeat blood cultures  6/7: Downgrade abx therpy to Ampicillin - IV abx therapy day 6  6/8: IV abx therapy day 7    Rash  Pt suspects R/T laundry detergent. Bendaryl PRN.      Myopathy  Continue therapy efforts.      Hypokalemia  Improved. Monitor daily labs and adjust repletion as needed.    Recent Labs   Lab 06/07/22  0333 06/08/22  0344   K 4.0 4.2         Abdominal pain  CT of abd/pelvis - was negative  Resolved      Longstanding persistent atrial fibrillation  Patient with Persistent (7 days or more) atrial fibrillation which is controlled currently with Beta Blocker and Amiodarone. Patient is currently in atrial fibrillation.[id number] Score: 2. Anticoagulation indicated. Anticoagulation done with eliquis.  Cards helpign due to recent rvr afib and recent pulm edema      Lumbar disc disease with radiculopathy  Pt with  severe back pain - CT of back shows no fractures but severe degenerative dz  Continue therapy efforts      Acute pulmonary edema  Resolved        VTE Risk Mitigation (From admission, onward)         Ordered     apixaban tablet 5 mg  2 times daily         06/02/22 1742     IP VTE HIGH RISK PATIENT  Once         06/01/22 1440     Place sequential compression device  Until discontinued         06/01/22 1440                Discharge Planning   PARISH:      Code Status: Full Code   Is the patient medically ready for discharge?:     Reason for patient still in hospital (select all that apply): Patient trending condition, Laboratory test and Treatment  Discharge Plan A: Home, Home Health                  Marizol Hernández NP  Department of Hospital Medicine   [facility]

## 2022-06-08 NOTE — PLAN OF CARE
Pt. Resting in bed, no complaints of pain or respiratory distress noted at current, makes needs known, takes meds whole, rash to back and right side, complaints of itching at times through shift, PRN hydrocortisone cream and benadryl given as ordered, see MAR, able to assist with turning in bed, therapy on case, purewick in use, urine le, IV antibiotics as ordered, call bell in reach, instructed to call with needs/assistance, will continue to monitor.

## 2022-06-08 NOTE — SUBJECTIVE & OBJECTIVE
Interval History: Pt seen and evaluated    Review of Systems   Constitutional:  Positive for activity change and fatigue. Negative for fever.   HENT:  Negative for sinus pressure and sinus pain.    Respiratory:  Negative for cough, shortness of breath and wheezing.    Cardiovascular:  Negative for chest pain, palpitations and leg swelling.   Gastrointestinal:  Negative for abdominal pain, blood in stool, constipation, diarrhea, nausea and vomiting.   Musculoskeletal:  Positive for arthralgias, back pain and gait problem.   Skin:  Positive for rash. Negative for wound.   Allergic/Immunologic: Positive for environmental allergies.   Neurological:  Positive for weakness. Negative for seizures and syncope.   Objective:     Vital Signs (Most Recent):  Temp: 97.7 °F (36.5 °C) (06/08/22 0743)  Pulse: 79 (06/08/22 0743)  Resp: 18 (06/08/22 0932)  BP: 120/70 (06/08/22 0928)  SpO2: (!) 94 % (06/08/22 0743)   Vital Signs (24h Range):  Temp:  [97.3 °F (36.3 °C)-98.2 °F (36.8 °C)] 97.7 °F (36.5 °C)  Pulse:  [68-85] 79  Resp:  [16-20] 18  SpO2:  [90 %-94 %] 94 %  BP: ()/(52-70) 120/70     Weight: 136.1 kg (300 lb)  Body mass index is 51.49 kg/m².    Intake/Output Summary (Last 24 hours) at 6/8/2022 0942  Last data filed at 6/8/2022 0600  Gross per 24 hour   Intake 2180 ml   Output 1700 ml   Net 480 ml        Physical Exam  Constitutional:       Appearance: She is obese.   HENT:      Head: Normocephalic.      Nose: Nose normal.      Mouth/Throat:      Mouth: Mucous membranes are moist.   Eyes:      Extraocular Movements: Extraocular movements intact.      Pupils: Pupils are equal, round, and reactive to light.   Cardiovascular:      Rate and Rhythm: Normal rate and regular rhythm.   Pulmonary:      Effort: Pulmonary effort is normal.      Breath sounds: Normal breath sounds.   Abdominal:      General: Bowel sounds are normal. There is no distension.      Palpations: Abdomen is soft.   Musculoskeletal:      Cervical back:  Normal range of motion.   Skin:     General: Skin is warm and dry.      Findings: Lesion and rash present.   Neurological:      Mental Status: She is alert and oriented to person, place, and time.       Significant Labs: All pertinent labs within the past 24 hours have been reviewed.    CBC:   Recent Labs   Lab 06/07/22  0333 06/08/22  0344   WBC 7.87 8.30   HGB 11.7* 11.7*   HCT 35.6* 36.2*    311       CMP:   Recent Labs   Lab 06/07/22  0333 06/08/22  0344    140   K 4.0 4.2    105   CO2 29 28   GLU 96 97   BUN 16 16   CREATININE 0.8 1.0   CALCIUM 8.5* 8.6*   PROT 6.9 7.1   ALBUMIN 2.1* 2.2*   BILITOT 0.6 0.5   ALKPHOS 100 104   AST 22 20   ALT 19 19   ANIONGAP 3* 7*   EGFRNONAA >60.0 57.6*       Magnesium:   Recent Labs   Lab 06/07/22 0333 06/08/22  0344   MG 2.4 2.5         Significant Imaging: I have reviewed all pertinent imaging results/findings within the past 24 hours.

## 2022-06-08 NOTE — PT/OT/SLP PROGRESS
Occupational Therapy   Treatment    Name: Aurelia Ruggiero  MRN: [id number]  Admitting Diagnosis:  Sepsis       Recommendations:     Discharge Recommendations: rehabilitation facility  Discharge Equipment Recommendations:  bedside commode  Barriers to discharge:  Other (Comment) (Medical and functional status)    Assessment:     Aurelia Ruggiero is a 67 y.o. female with a medical diagnosis of Sepsis. Performance deficits affecting function are weakness, impaired endurance, impaired self care skills, impaired functional mobilty, impaired balance, decreased lower extremity function, decreased safety awareness, pain, decreased ROM, decreased coordination, impaired skin, edema, impaired cardiopulmonary response to activity.     Rehab Prognosis:  Good; patient would benefit from acute skilled OT services to address these deficits and reach maximum level of function.       Plan:     Patient to be seen 6 x/week to address the above listed problems via self-care/home management, therapeutic activities, therapeutic exercises  · Plan of Care Expires: 06/17/22  · Plan of Care Reviewed with: patient    Subjective     Pain/Comfort:  · Pain Rating 1: 7/10  · Location - Side 1: Left  · Location - Orientation 1: lower  · Location 1: back  · Pain Addressed 1: Pre-medicate for activity, Reposition, Cessation of Activity, Nurse notified  · Pain Rating Post-Intervention 1: 4/10    Objective:     Communicated with: nurse prior to session.  Patient found supine with telemetry, peripheral IV, PureWick upon OT entry to room.    General Precautions: Standard, fall   Orthopedic Precautions:N/A   Braces:    Respiratory Status: Room air     Occupational Performance:     Bed Mobility:    · Patient completed Rolling/Turning to Left with  moderate assistance  · Patient completed Rolling/Turning to Right with moderate assistance  · Patient completed Scooting/Bridging with maximal assistance  · Patient completed Supine to Sit with maximal  assistance  · Patient completed Sit to Supine with maximal assistance     Functional Mobility/Transfers:  · Pt unable to transfer on this date, but progressing towards tolerating standing activity.  · Functional Mobility: Pt unable to ambulate on this date.     Treatment & Education:  Pt was cooperative and motivated with verbal encouragement while exhibiting positive affect, but increased apprehension with activity due to pain. She participated in extensive bed mobility retraining emphasizing technique in order to reduce pain providing much extra time with repetition requiring mod to max assist secondary to much lifting assist and stabilization of trunk with additional assist with bilateral LE's off EOB during supine to sit transition, much scooting assist at bilateral hips to EOB, and lowering assist with lifting assist of bilateral LE's during sit to supine transition with verbal and tactile cueing for technique utilizing bedrails. Pt then participated in therapeutic activity addressing trunk control, trunk strength, static / dynamic sitting balance, functional reaching, and energy for task / endurance challenging her to sustain upright seated position at EOB unsupported while intermittently reaching in multiple planes utilizing bilateral UE's requiring min to mod steadying assist with minimal excursions of trunk with additional cueing for proper weight shifting due to increased posterior lean and to facilitate optimal movement patterns.     Patient left HOB elevated with all lines intact, call button in reach and nurse notifiedEducation:      GOALS:   Multidisciplinary Problems     Occupational Therapy Goals        Problem: Occupational Therapy    Goal Priority Disciplines Outcome Interventions   Occupational Therapy Goal     OT, PT/OT Ongoing, Not Progressing    Description: Goals to be met by: discharge    Patient will increase functional independence with ADLs by performing:    UE Dressing with Minimal  Assistance.  LE Dressing with Minimal Assistance.  Toileting from toilet with Minimal Assistance for hygiene and clothing management.   Sitting at edge of bed x10 minutes with Minimal Assistance.  Supine to sit with Minimal Assistance.                     Time Tracking:     OT Date of Treatment: 06/08/22  OT Start Time: 1330  OT Stop Time: 1430  OT Total Time (min): 60 min    Billable Minutes:Therapeutic Activity 60    OT/CARI: OT     CARI Visit Number: 2    6/8/2022

## 2022-06-08 NOTE — ASSESSMENT & PLAN NOTE
Improved. Monitor daily labs and adjust repletion as needed.    Recent Labs   Lab 06/07/22  0333 06/08/22  0344   K 4.0 4.2

## 2022-06-08 NOTE — ASSESSMENT & PLAN NOTE
Patient with Persistent (7 days or more) atrial fibrillation which is controlled currently with Beta Blocker and Amiodarone. Patient is currently in atrial fibrillation.[id number] Score: 2. Anticoagulation indicated. Anticoagulation done with eliquis.  Cards helpign due to recent rvr afib and recent pulm edema

## 2022-06-09 LAB
ALBUMIN SERPL BCP-MCNC: 2.4 G/DL (ref 3.5–5.2)
ALP SERPL-CCNC: 98 U/L (ref 55–135)
ALT SERPL W/O P-5'-P-CCNC: 18 U/L (ref 10–44)
ANION GAP SERPL CALC-SCNC: 4 MMOL/L (ref 8–16)
AST SERPL-CCNC: 18 U/L (ref 10–40)
BACTERIA BLD CULT: NORMAL
BACTERIA BLD CULT: NORMAL
BASOPHILS # BLD AUTO: 0.07 K/UL (ref 0–0.2)
BASOPHILS NFR BLD: 0.9 % (ref 0–1.9)
BILIRUB SERPL-MCNC: 0.5 MG/DL (ref 0.1–1)
BUN SERPL-MCNC: 20 MG/DL (ref 8–23)
CALCIUM SERPL-MCNC: 8.6 MG/DL (ref 8.7–10.5)
CHLORIDE SERPL-SCNC: 104 MMOL/L (ref 95–110)
CO2 SERPL-SCNC: 31 MMOL/L (ref 23–29)
CREAT SERPL-MCNC: 1.3 MG/DL (ref 0.5–1.4)
DIFFERENTIAL METHOD: ABNORMAL
EOSINOPHIL # BLD AUTO: 0.3 K/UL (ref 0–0.5)
EOSINOPHIL NFR BLD: 3.9 % (ref 0–8)
ERYTHROCYTE [DISTWIDTH] IN BLOOD BY AUTOMATED COUNT: 14.6 % (ref 11.5–14.5)
EST. GFR  (AFRICAN AMERICAN): 48.4 ML/MIN/1.73 M^2
EST. GFR  (NON AFRICAN AMERICAN): 42 ML/MIN/1.73 M^2
GLUCOSE SERPL-MCNC: 113 MG/DL (ref 70–110)
HCT VFR BLD AUTO: 36.6 % (ref 37–48.5)
HGB BLD-MCNC: 11.8 G/DL (ref 12–16)
IMM GRANULOCYTES # BLD AUTO: 0.13 K/UL (ref 0–0.04)
IMM GRANULOCYTES NFR BLD AUTO: 1.6 % (ref 0–0.5)
LYMPHOCYTES # BLD AUTO: 2.4 K/UL (ref 1–4.8)
LYMPHOCYTES NFR BLD: 30.1 % (ref 18–48)
MAGNESIUM SERPL-MCNC: 2.5 MG/DL (ref 1.6–2.6)
MCH RBC QN AUTO: 31.6 PG (ref 27–31)
MCHC RBC AUTO-ENTMCNC: 32.2 G/DL (ref 32–36)
MCV RBC AUTO: 98 FL (ref 82–98)
MONOCYTES # BLD AUTO: 1 K/UL (ref 0.3–1)
MONOCYTES NFR BLD: 12.3 % (ref 4–15)
NEUTROPHILS # BLD AUTO: 4.1 K/UL (ref 1.8–7.7)
NEUTROPHILS NFR BLD: 51.2 % (ref 38–73)
NRBC BLD-RTO: 0 /100 WBC
PLATELET # BLD AUTO: 307 K/UL (ref 150–450)
PMV BLD AUTO: 10.3 FL (ref 9.2–12.9)
POTASSIUM SERPL-SCNC: 4.5 MMOL/L (ref 3.5–5.1)
PROT SERPL-MCNC: 7.4 G/DL (ref 6–8.4)
RBC # BLD AUTO: 3.74 M/UL (ref 4–5.4)
SODIUM SERPL-SCNC: 139 MMOL/L (ref 136–145)
WBC # BLD AUTO: 8.03 K/UL (ref 3.9–12.7)

## 2022-06-09 PROCEDURE — 85025 COMPLETE CBC W/AUTO DIFF WBC: CPT | Performed by: INTERNAL MEDICINE

## 2022-06-09 PROCEDURE — 63600175 PHARM REV CODE 636 W HCPCS: Performed by: NURSE PRACTITIONER

## 2022-06-09 PROCEDURE — 80053 COMPREHEN METABOLIC PANEL: CPT | Performed by: INTERNAL MEDICINE

## 2022-06-09 PROCEDURE — 97530 THERAPEUTIC ACTIVITIES: CPT | Mod: CO

## 2022-06-09 PROCEDURE — 97535 SELF CARE MNGMENT TRAINING: CPT | Mod: CO

## 2022-06-09 PROCEDURE — 25000003 PHARM REV CODE 250: Performed by: FAMILY MEDICINE

## 2022-06-09 PROCEDURE — 11000001 HC ACUTE MED/SURG PRIVATE ROOM

## 2022-06-09 PROCEDURE — 63700000 PHARM REV CODE 250 ALT 637 W/O HCPCS: Performed by: INTERNAL MEDICINE

## 2022-06-09 PROCEDURE — 99900035 HC TECH TIME PER 15 MIN (STAT)

## 2022-06-09 PROCEDURE — 36415 COLL VENOUS BLD VENIPUNCTURE: CPT | Performed by: INTERNAL MEDICINE

## 2022-06-09 PROCEDURE — 99900031 HC PATIENT EDUCATION (STAT)

## 2022-06-09 PROCEDURE — 83735 ASSAY OF MAGNESIUM: CPT | Performed by: INTERNAL MEDICINE

## 2022-06-09 PROCEDURE — 94761 N-INVAS EAR/PLS OXIMETRY MLT: CPT

## 2022-06-09 PROCEDURE — 97110 THERAPEUTIC EXERCISES: CPT

## 2022-06-09 PROCEDURE — 97530 THERAPEUTIC ACTIVITIES: CPT

## 2022-06-09 PROCEDURE — 25000003 PHARM REV CODE 250: Performed by: NURSE PRACTITIONER

## 2022-06-09 PROCEDURE — 25000003 PHARM REV CODE 250: Performed by: INTERNAL MEDICINE

## 2022-06-09 PROCEDURE — 63600175 PHARM REV CODE 636 W HCPCS: Performed by: INTERNAL MEDICINE

## 2022-06-09 RX ADMIN — DIPHENHYDRAMINE HYDROCHLORIDE 25 MG: 25 CAPSULE ORAL at 05:06

## 2022-06-09 RX ADMIN — GABAPENTIN 300 MG: 300 CAPSULE ORAL at 09:06

## 2022-06-09 RX ADMIN — FLUCONAZOLE 200 MG: 100 TABLET ORAL at 09:06

## 2022-06-09 RX ADMIN — HYDROMORPHONE HYDROCHLORIDE 2 MG: 2 INJECTION, SOLUTION INTRAMUSCULAR; INTRAVENOUS; SUBCUTANEOUS at 05:06

## 2022-06-09 RX ADMIN — AMPICILLIN 2 G: 2 INJECTION, POWDER, FOR SOLUTION INTRAMUSCULAR; INTRAVENOUS at 09:06

## 2022-06-09 RX ADMIN — HYDROCORTISONE: 0.01 CREAM TOPICAL at 12:06

## 2022-06-09 RX ADMIN — PANTOPRAZOLE SODIUM 40 MG: 40 TABLET, DELAYED RELEASE ORAL at 09:06

## 2022-06-09 RX ADMIN — HYDROMORPHONE HYDROCHLORIDE 2 MG: 2 INJECTION, SOLUTION INTRAMUSCULAR; INTRAVENOUS; SUBCUTANEOUS at 11:06

## 2022-06-09 RX ADMIN — AMPICILLIN 2 G: 2 INJECTION, POWDER, FOR SOLUTION INTRAMUSCULAR; INTRAVENOUS at 06:06

## 2022-06-09 RX ADMIN — AMPICILLIN 2 G: 2 INJECTION, POWDER, FOR SOLUTION INTRAMUSCULAR; INTRAVENOUS at 01:06

## 2022-06-09 RX ADMIN — LEVOTHYROXINE SODIUM 150 MCG: 150 TABLET ORAL at 05:06

## 2022-06-09 RX ADMIN — APIXABAN 5 MG: 5 TABLET, FILM COATED ORAL at 09:06

## 2022-06-09 RX ADMIN — AMPICILLIN 2 G: 2 INJECTION, POWDER, FOR SOLUTION INTRAMUSCULAR; INTRAVENOUS at 05:06

## 2022-06-09 RX ADMIN — AMIODARONE HYDROCHLORIDE 100 MG: 100 TABLET ORAL at 09:06

## 2022-06-09 RX ADMIN — HYDROMORPHONE HYDROCHLORIDE 2 MG: 2 INJECTION, SOLUTION INTRAMUSCULAR; INTRAVENOUS; SUBCUTANEOUS at 09:06

## 2022-06-09 RX ADMIN — METOPROLOL TARTRATE 50 MG: 50 TABLET, FILM COATED ORAL at 09:06

## 2022-06-09 RX ADMIN — SPIRONOLACTONE 12.5 MG: 25 TABLET, FILM COATED ORAL at 09:06

## 2022-06-09 RX ADMIN — ALPRAZOLAM 0.5 MG: 0.5 TABLET ORAL at 09:06

## 2022-06-09 NOTE — PROGRESS NOTES
[facility]  Progress Note    Patient Name: Aurelia Ruggiero  MRN: [id number]  Patient Class: IP- Inpatient   Admission Date: 6/1/2022  Length of Stay: 8 days  Attending Physician: Marimar Kellogg MD  Primary Care Provider: Marimar Kellogg MD        Subjective:     Principal Problem:Sepsis        HPI:  67-year-old female presents to the ED via EMS with reports of worsening lower back pain.  Patient denies any falls or trauma reports that pain starts and left lower back and radiates down to left leg.  Patient reports she has a history of arthritis but denies any surgeries or procedures on back.  EMS reports patient was noted to be O2 sat on room air of 88%.  Patient has a history of AFib but reports not taking medications this morning.  Patient's PCP is Dr. Kellogg       Overview/Hospital Course:  6/3 ND pt more peppy this am - still havign lower back and side pain.   Informed pt and son of her sepsis dx and need for iv abxs.  Breathing is better per pt.  6/4 WC:  Tolerating treatment well thus far.  Still very fatigued.   6/5 WC:  Surveillance cultures negative thus far.  Patient remains fatigued with diffuse pain.  6/6 SD: Back pain improved, moving more with less pain - continue therapy efforts. Repeat blood cultures in process. Continue abx therapy (day 5).  6/7 SD: Downgrade abx therapy to ampicillin. Rash to back - pt feels related to laundry detergent - Benadryl PRN.  6/8 SD: Continue abx therapy. Rash improved. Continue therapy efforts.  6/9 SD: Continue abx therapy. Improved effort with OT yesterday. Continue PT/OT.      Interval History: Pt seen and evaluated    Review of Systems   Constitutional:  Positive for activity change and fatigue. Negative for fever.   HENT:  Negative for sinus pressure and sinus pain.    Respiratory:  Negative for cough, shortness of breath and wheezing.    Cardiovascular:  Negative for chest pain, palpitations and leg swelling.   Gastrointestinal:   Negative for abdominal pain, blood in stool, constipation, diarrhea, nausea and vomiting.   Musculoskeletal:  Positive for arthralgias, back pain and gait problem.   Skin:  Positive for rash. Negative for wound.   Allergic/Immunologic: Positive for environmental allergies.   Neurological:  Positive for weakness. Negative for seizures and syncope.   Objective:     Vital Signs (Most Recent):  Temp: 98 °F (36.7 °C) (06/09/22 0445)  Pulse: 80 (06/09/22 0445)  Resp: (!) 22 (06/09/22 1109)  BP: 120/83 (06/09/22 0904)  SpO2: (!) 92 % (06/09/22 0729)   Vital Signs (24h Range):  Temp:  [98 °F (36.7 °C)-98.5 °F (36.9 °C)] 98 °F (36.7 °C)  Pulse:  [80-95] 80  Resp:  [18-22] 22  SpO2:  [90 %-95 %] 92 %  BP: (120-126)/(80-83) 120/83     Weight: 136.1 kg (300 lb)  Body mass index is 51.49 kg/m².    Intake/Output Summary (Last 24 hours) at 6/9/2022 1157  Last data filed at 6/9/2022 0600  Gross per 24 hour   Intake 3820 ml   Output 1901 ml   Net 1919 ml        Physical Exam  Constitutional:       Appearance: She is obese.   HENT:      Head: Normocephalic.      Nose: Nose normal.      Mouth/Throat:      Mouth: Mucous membranes are moist.   Eyes:      Extraocular Movements: Extraocular movements intact.      Pupils: Pupils are equal, round, and reactive to light.   Cardiovascular:      Rate and Rhythm: Normal rate and regular rhythm.   Pulmonary:      Effort: Pulmonary effort is normal.      Breath sounds: Normal breath sounds.   Abdominal:      General: Bowel sounds are normal. There is no distension.      Palpations: Abdomen is soft.   Musculoskeletal:      Cervical back: Normal range of motion.   Skin:     General: Skin is warm and dry.      Findings: Lesion and rash present.   Neurological:      Mental Status: She is alert and oriented to person, place, and time.       Significant Labs: All pertinent labs within the past 24 hours have been reviewed.    CBC:   Recent Labs   Lab 06/08/22  0344 06/09/22  0441   WBC 8.30 8.03   HGB  11.7* 11.8*   HCT 36.2* 36.6*    307       CMP:   Recent Labs   Lab 06/08/22  0344 06/09/22  0441    139   K 4.2 4.5    104   CO2 28 31*   GLU 97 113*   BUN 16 20   CREATININE 1.0 1.3   CALCIUM 8.6* 8.6*   PROT 7.1 7.4   ALBUMIN 2.2* 2.4*   BILITOT 0.5 0.5   ALKPHOS 104 98   AST 20 18   ALT 19 18   ANIONGAP 7* 4*   EGFRNONAA 57.6* 42.0*       Magnesium:   Recent Labs   Lab 06/08/22  0344 06/09/22  0441   MG 2.5 2.5         Significant Imaging: I have reviewed all pertinent imaging results/findings within the past 24 hours.      Assessment/Plan:      * Sepsis  This patient does have evidence of infective focus  My overall impression is sepsis. Vital signs were reviewed and noted in progress note.  Antibiotics given-   Antibiotics (From admission, onward)            Start     Stop Route Frequency Ordered    06/07/22 0945  ampicillin 2 g in sodium chloride 0.9 % 100 mL IVPB (ready to mix system)         -- IV Every 4 hours (non-standard times) 06/07/22 0838        Cultures were taken-   Microbiology Results (last 7 days)     Procedure Component Value Units Date/Time    Blood culture [[id number]] Collected: 06/04/22 1255    Order Status: Completed Specimen: Blood Updated: 06/08/22 2212     Blood Culture, Routine No Growth to date      No Growth to date      No Growth to date      No Growth to date      No Growth to date    Blood culture [[id number]] Collected: 06/04/22 1301    Order Status: Completed Specimen: Blood Updated: 06/08/22 2212     Blood Culture, Routine No Growth to date      No Growth to date      No Growth to date      No Growth to date      No Growth to date    Blood culture x two cultures. Draw prior to antibiotics. [[id number]]  (Abnormal) Collected: 06/01/22 1019    Order Status: Completed Specimen: Blood Updated: 06/04/22 1251     Blood Culture, Routine Gram stain aer bottle: Gram positive cocci in chains resembling Strep       Gram stain nita bottle: Gram positive cocci in chains  resembling Strep       Positive results previously called 06/02/2022  05:14      ENTEROCOCCUS FAECALIS  For susceptibility see order # [id number]      Narrative:      Aerobic and anaerobic    Blood culture x two cultures. Draw prior to antibiotics. [[id number]]  (Abnormal)  (Susceptibility) Collected: 06/01/22 1002    Order Status: Completed Specimen: Blood Updated: 06/04/22 1250     Blood Culture, Routine Gram stain nita bottle: Gram positive cocci in chains resembling Strep       Results called to and read back by: Karen Davis RN 06/02/2022        03:36      Gram stain aer bottle: Gram positive cocci in chains resembling Strep       Positive results previously called 06/02/2022  05:15      ENTEROCOCCUS FAECALIS    Narrative:      Aerobic and anaerobic        Latest lactate reviewed, they are-  No results for input(s): LACTATE in the last 72 hours.    Organ dysfunction indicated by Acute respiratory failure  Source- uti, bacteremia    Source control Achieved by- iv abxs  6/3 cont iv abxs- follow sensitivites  6/4:  Awaiting sensitivities.  Draw surveillance cultures.   6/5:  Sensitive to vancomycin.  Surveillance cultures negative thus far.  6/6: Continue abx therapy with Rocephin and Vancomycin (day 5). Monitoring repeat blood cultures  6/7: Downgrade abx therpy to Ampicillin - IV abx therapy day 6  6/8: IV abx therapy day 7 with Ampicillin  6/9: IV abx therapy day 8 with Ampicillin    Rash  Pt suspects R/T laundry detergent. Bendaryl PRN.      Myopathy  Continue therapy efforts.      Hypokalemia  Improved. Monitor daily labs and adjust repletion as needed.    Recent Labs   Lab 06/08/22  0344 06/09/22  0441   K 4.2 4.5         Abdominal pain  CT of abd/pelvis - was negative  Resolved      Longstanding persistent atrial fibrillation  Patient with Persistent (7 days or more) atrial fibrillation which is controlled currently with Beta Blocker and Amiodarone. Patient is currently in atrial fibrillation.[id number]  Score: 2. Anticoagulation indicated. Anticoagulation done with eliquis.  Cards helpign due to recent rvr afib and recent pulm edema      Lumbar disc disease with radiculopathy  Pt with severe back pain - CT of back shows no fractures but severe degenerative dz  Continue therapy efforts      Acute pulmonary edema  Resolved        VTE Risk Mitigation (From admission, onward)         Ordered     apixaban tablet 5 mg  2 times daily         06/02/22 1742     IP VTE HIGH RISK PATIENT  Once         06/01/22 1440     Place sequential compression device  Until discontinued         06/01/22 1440                Discharge Planning   PARISH:      Code Status: Full Code   Is the patient medically ready for discharge?:     Reason for patient still in hospital (select all that apply): Patient trending condition, Laboratory test and Treatment  Discharge Plan A: Home, Home Health                  Marizol Hernández NP  Department of Hospital Medicine   [facility]

## 2022-06-09 NOTE — SUBJECTIVE & OBJECTIVE
Interval History: Pt seen and evaluated    Review of Systems   Constitutional:  Positive for activity change and fatigue. Negative for fever.   HENT:  Negative for sinus pressure and sinus pain.    Respiratory:  Negative for cough, shortness of breath and wheezing.    Cardiovascular:  Negative for chest pain, palpitations and leg swelling.   Gastrointestinal:  Negative for abdominal pain, blood in stool, constipation, diarrhea, nausea and vomiting.   Musculoskeletal:  Positive for arthralgias, back pain and gait problem.   Skin:  Positive for rash. Negative for wound.   Allergic/Immunologic: Positive for environmental allergies.   Neurological:  Positive for weakness. Negative for seizures and syncope.   Objective:     Vital Signs (Most Recent):  Temp: 98 °F (36.7 °C) (06/09/22 0445)  Pulse: 80 (06/09/22 0445)  Resp: (!) 22 (06/09/22 1109)  BP: 120/83 (06/09/22 0904)  SpO2: (!) 92 % (06/09/22 0729)   Vital Signs (24h Range):  Temp:  [98 °F (36.7 °C)-98.5 °F (36.9 °C)] 98 °F (36.7 °C)  Pulse:  [80-95] 80  Resp:  [18-22] 22  SpO2:  [90 %-95 %] 92 %  BP: (120-126)/(80-83) 120/83     Weight: 136.1 kg (300 lb)  Body mass index is 51.49 kg/m².    Intake/Output Summary (Last 24 hours) at 6/9/2022 1157  Last data filed at 6/9/2022 0600  Gross per 24 hour   Intake 3820 ml   Output 1901 ml   Net 1919 ml        Physical Exam  Constitutional:       Appearance: She is obese.   HENT:      Head: Normocephalic.      Nose: Nose normal.      Mouth/Throat:      Mouth: Mucous membranes are moist.   Eyes:      Extraocular Movements: Extraocular movements intact.      Pupils: Pupils are equal, round, and reactive to light.   Cardiovascular:      Rate and Rhythm: Normal rate and regular rhythm.   Pulmonary:      Effort: Pulmonary effort is normal.      Breath sounds: Normal breath sounds.   Abdominal:      General: Bowel sounds are normal. There is no distension.      Palpations: Abdomen is soft.   Musculoskeletal:      Cervical back:  Normal range of motion.   Skin:     General: Skin is warm and dry.      Findings: Lesion and rash present.   Neurological:      Mental Status: She is alert and oriented to person, place, and time.       Significant Labs: All pertinent labs within the past 24 hours have been reviewed.    CBC:   Recent Labs   Lab 06/08/22 0344 06/09/22 0441   WBC 8.30 8.03   HGB 11.7* 11.8*   HCT 36.2* 36.6*    307       CMP:   Recent Labs   Lab 06/08/22 0344 06/09/22 0441    139   K 4.2 4.5    104   CO2 28 31*   GLU 97 113*   BUN 16 20   CREATININE 1.0 1.3   CALCIUM 8.6* 8.6*   PROT 7.1 7.4   ALBUMIN 2.2* 2.4*   BILITOT 0.5 0.5   ALKPHOS 104 98   AST 20 18   ALT 19 18   ANIONGAP 7* 4*   EGFRNONAA 57.6* 42.0*       Magnesium:   Recent Labs   Lab 06/08/22 0344 06/09/22 0441   MG 2.5 2.5         Significant Imaging: I have reviewed all pertinent imaging results/findings within the past 24 hours.

## 2022-06-09 NOTE — ASSESSMENT & PLAN NOTE
Patient with Persistent (7 days or more) atrial fibrillation which is controlled currently with Beta Blocker and Amiodarone. Patient is currently in atrial fibrillation.[id number] Score: 2. Anticoagulation indicated. Anticoagulation done with eliquis.  Cards helpign due to recent rvr afib and recent pulm edema

## 2022-06-09 NOTE — PLAN OF CARE
Plan of care reviewed with the patient. Patient is improving with mobility of her back but still with complaints of back pain and given PRN pain medication during shift.

## 2022-06-09 NOTE — PT/OT/SLP PROGRESS
Physical Therapy Treatment    Patient Name:  Aurelia Ruggiero   MRN:  [id number]    Recommendations:     Discharge Recommendations:      Discharge Equipment Recommendations:     Barriers to discharge: decreased fxn'l mobility and weakness    Assessment:     Aurelia Ruggiero is a 67 y.o. female admitted with a medical diagnosis of Sepsis.  She presents with the following impairments/functional limitations:    Pt was able to stand today with maxa, and her bed mobility is at a maxa level . She was cooperative , but required lots of time pre transitional movement and during transitional movement    Rehab Prognosis: Fair; patient would benefit from acute skilled PT services to address these deficits and reach maximum level of function.    Recent Surgery: * No surgery found *      Plan:     During this hospitalization, patient to be seen   to address the identified rehab impairments via   and progress toward the following goals:    · Plan of Care Expires:       Subjective     Chief Complaint: LBP  Patient/Family Comments/goals: wants to get well enough to go home and take care of herself  Pain/Comfort:  ·        Objective:     Communicated with nurse prior to session.  Patient found supine with   upon PT entry to room.     General Precautions: Standard,     Orthopedic Precautions:    Braces:    Respiratory Status: Room air     Functional Mobility:  · Bed Mobility:     · Rolling Left:  maximal assistance  · Scooting: maximal assistance  · Supine to Sit: total assistance  · Sit to Supine: maximal assistance  · Transfers:     · Sit to Stand:  maximal assistance with no AD  · Bed to Chair: not attempted with  no AD  using     · Gait: unable to walk at this time      AM-PAC 6 CLICK MOBILITY          Therapeutic Activities and Exercises:  Pt did perform AROM exs at EOB     Patient left supine with call button in reach..    GOALS:   Multidisciplinary Problems     Physical Therapy Goals        Problem: Physical Therapy    Goal Priority  Disciplines Outcome Goal Variances Interventions   Physical Therapy Goal     PT, PT/OT      Description: Goals to be met by: 22    Patient will increase functional independence with mobility by performin. Supine to sit with Moderate Assistance  2. Sit to supine with Moderate Assistance  3. Rolling to Left and Right with Moderate Assistance.  4. Sitting at edge of bed x10 minutes with Minimal Assistance                     Time Tracking:     PT Received On:    PT Start Time:       PT Stop Time:    PT Total Time (min):       Billable Minutes: Therapeutic Activity 15 and Therapeutic Exercise 10                   2022

## 2022-06-09 NOTE — ASSESSMENT & PLAN NOTE
This patient does have evidence of infective focus  My overall impression is sepsis. Vital signs were reviewed and noted in progress note.  Antibiotics given-   Antibiotics (From admission, onward)            Start     Stop Route Frequency Ordered    06/07/22 0945  ampicillin 2 g in sodium chloride 0.9 % 100 mL IVPB (ready to mix system)         -- IV Every 4 hours (non-standard times) 06/07/22 0838        Cultures were taken-   Microbiology Results (last 7 days)     Procedure Component Value Units Date/Time    Blood culture [[id number]] Collected: 06/04/22 1255    Order Status: Completed Specimen: Blood Updated: 06/08/22 2212     Blood Culture, Routine No Growth to date      No Growth to date      No Growth to date      No Growth to date      No Growth to date    Blood culture [[id number]] Collected: 06/04/22 1301    Order Status: Completed Specimen: Blood Updated: 06/08/22 2212     Blood Culture, Routine No Growth to date      No Growth to date      No Growth to date      No Growth to date      No Growth to date    Blood culture x two cultures. Draw prior to antibiotics. [[id number]]  (Abnormal) Collected: 06/01/22 1019    Order Status: Completed Specimen: Blood Updated: 06/04/22 1251     Blood Culture, Routine Gram stain aer bottle: Gram positive cocci in chains resembling Strep       Gram stain nita bottle: Gram positive cocci in chains resembling Strep       Positive results previously called 06/02/2022  05:14      ENTEROCOCCUS FAECALIS  For susceptibility see order # [id number]      Narrative:      Aerobic and anaerobic    Blood culture x two cultures. Draw prior to antibiotics. [[id number]]  (Abnormal)  (Susceptibility) Collected: 06/01/22 1002    Order Status: Completed Specimen: Blood Updated: 06/04/22 1250     Blood Culture, Routine Gram stain nita bottle: Gram positive cocci in chains resembling Strep       Results called to and read back by: Karen Davis RN 06/02/2022        03:36      Gram stain aer  bottle: Gram positive cocci in chains resembling Strep       Positive results previously called 06/02/2022  05:15      ENTEROCOCCUS FAECALIS    Narrative:      Aerobic and anaerobic        Latest lactate reviewed, they are-  No results for input(s): LACTATE in the last 72 hours.    Organ dysfunction indicated by Acute respiratory failure  Source- uti, bacteremia    Source control Achieved by- iv abxs  6/3 cont iv abxs- follow sensitivites  6/4:  Awaiting sensitivities.  Draw surveillance cultures.   6/5:  Sensitive to vancomycin.  Surveillance cultures negative thus far.  6/6: Continue abx therapy with Rocephin and Vancomycin (day 5). Monitoring repeat blood cultures  6/7: Downgrade abx therpy to Ampicillin - IV abx therapy day 6  6/8: IV abx therapy day 7 with Ampicillin  6/9: IV abx therapy day 8 with Ampicillin

## 2022-06-09 NOTE — PT/OT/SLP PROGRESS
Occupational Therapy   Treatment    Name: Aurelia Ruggiero  MRN: [id number]  Admitting Diagnosis:  Sepsis       Recommendations:     Discharge Recommendations: rehabilitation facility  Discharge Equipment Recommendations:  bedside commode  Barriers to discharge:   (Further medical care required)    Assessment:     Aurelia Ruggiero is a 67 y.o. female with a medical diagnosis of Sepsis. Performance deficits affecting function are weakness, impaired endurance, impaired self care skills, impaired functional mobilty, impaired balance, decreased lower extremity function, decreased coordination, decreased upper extremity function, pain.     Rehab Prognosis:  Fair; patient would benefit from acute skilled OT services to address these deficits and reach maximum level of function.       Plan:     Patient to be seen 6 x/week to address the above listed problems via self-care/home management, therapeutic activities, therapeutic exercises, neuromuscular re-education  · Plan of Care Expires: 06/17/22  · Plan of Care Reviewed with: patient    Subjective     Pain/Comfort:  · Pain Rating 1:  (pt did not rate or report pain)    Objective:     Communicated with: Nurse, PT prior to session.  Patient found supine with telemetry, PureWick upon OT entry to room.    General Precautions: Standard, fall   Orthopedic Precautions:N/A   Braces: N/A  Respiratory Status: Room air     Occupational Performance:     Bed Mobility:    · Patient completed Rolling/Turning to Left with  maximal assistance  · Patient completed Rolling/Turning to Right with maximal assistance  · Patient completed Scooting/Bridging with maximal assistance  · Patient completed Supine to Sit with total assistance  · Patient completed Sit to Supine with maximal assistance     Functional Mobility/Transfers:  · Patient completed Sit <> Stand Transfer with maximal assistance  with  no assistive device see PT note    Treatment & Education:  Pt alert and agreeable to treatment this day.  Pt beginning bed mobility this day to prepare for transition to sitting EOB. PT enter room; PT and ROBERT seeing pt at same time to maximize pt participation and potential for rehabilitation. Pt transitioned from supine to sitting EOB as stated above. Pt sitting EOB ~5 mins this day without complaints of pain requiring intermittent assistance for maintaining upright posture. Pt performing LB ex with PT while ROBERT providing verbal and tactile cueing due to pt posterior lean. Pt performed sit to stand with PT; see PT note. Pt fatigued and requesting to lie down. Pt returned to supine positioned and scooted in bed as stated above. Total time EOB ~15 mins. Pt positioned for comfort and educated to utilize call light when assistance is needed.    Patient left HOB elevated with all lines intact, call button in reach, nurse\ notified and nurse tech presentEducation:      GOALS:   Multidisciplinary Problems     Occupational Therapy Goals        Problem: Occupational Therapy    Goal Priority Disciplines Outcome Interventions   Occupational Therapy Goal     OT, PT/OT Ongoing, Progressing    Description: Goals to be met by: discharge    Patient will increase functional independence with ADLs by performing:    UE Dressing with Minimal Assistance.  LE Dressing with Minimal Assistance.  Toileting from toilet with Minimal Assistance for hygiene and clothing management.   Sitting at edge of bed x10 minutes with Minimal Assistance.  Supine to sit with Minimal Assistance.                     Time Tracking:     OT Date of Treatment: 06/09/22  OT Start Time: 1120  OT Stop Time: 1156  OT Total Time (min): 36 min    Billable Minutes:Self Care/Home Management 15  Therapeutic Activity 21    OT/CARI: CARI     CARI Visit Number: 1    6/9/2022

## 2022-06-09 NOTE — ASSESSMENT & PLAN NOTE
Improved. Monitor daily labs and adjust repletion as needed.    Recent Labs   Lab 06/08/22  0344 06/09/22  0441   K 4.2 4.5

## 2022-06-10 LAB
ALBUMIN SERPL BCP-MCNC: 2.1 G/DL (ref 3.5–5.2)
ALP SERPL-CCNC: 95 U/L (ref 55–135)
ALT SERPL W/O P-5'-P-CCNC: 16 U/L (ref 10–44)
ANION GAP SERPL CALC-SCNC: 6 MMOL/L (ref 8–16)
AST SERPL-CCNC: 18 U/L (ref 10–40)
BASOPHILS # BLD AUTO: 0.11 K/UL (ref 0–0.2)
BASOPHILS NFR BLD: 1.3 % (ref 0–1.9)
BILIRUB SERPL-MCNC: 0.5 MG/DL (ref 0.1–1)
BUN SERPL-MCNC: 19 MG/DL (ref 8–23)
CALCIUM SERPL-MCNC: 8.4 MG/DL (ref 8.7–10.5)
CHLORIDE SERPL-SCNC: 106 MMOL/L (ref 95–110)
CO2 SERPL-SCNC: 28 MMOL/L (ref 23–29)
CREAT SERPL-MCNC: 1.1 MG/DL (ref 0.5–1.4)
DIFFERENTIAL METHOD: ABNORMAL
EOSINOPHIL # BLD AUTO: 0.4 K/UL (ref 0–0.5)
EOSINOPHIL NFR BLD: 4.2 % (ref 0–8)
ERYTHROCYTE [DISTWIDTH] IN BLOOD BY AUTOMATED COUNT: 14.6 % (ref 11.5–14.5)
EST. GFR  (AFRICAN AMERICAN): 59.2 ML/MIN/1.73 M^2
EST. GFR  (NON AFRICAN AMERICAN): 51.3 ML/MIN/1.73 M^2
GLUCOSE SERPL-MCNC: 99 MG/DL (ref 70–110)
HCT VFR BLD AUTO: 32.9 % (ref 37–48.5)
HGB BLD-MCNC: 10.6 G/DL (ref 12–16)
IMM GRANULOCYTES # BLD AUTO: 0.09 K/UL (ref 0–0.04)
IMM GRANULOCYTES NFR BLD AUTO: 1.1 % (ref 0–0.5)
LYMPHOCYTES # BLD AUTO: 2.3 K/UL (ref 1–4.8)
LYMPHOCYTES NFR BLD: 27.3 % (ref 18–48)
MAGNESIUM SERPL-MCNC: 2.4 MG/DL (ref 1.6–2.6)
MCH RBC QN AUTO: 31.4 PG (ref 27–31)
MCHC RBC AUTO-ENTMCNC: 32.2 G/DL (ref 32–36)
MCV RBC AUTO: 97 FL (ref 82–98)
MONOCYTES # BLD AUTO: 0.8 K/UL (ref 0.3–1)
MONOCYTES NFR BLD: 9.5 % (ref 4–15)
NEUTROPHILS # BLD AUTO: 4.8 K/UL (ref 1.8–7.7)
NEUTROPHILS NFR BLD: 56.6 % (ref 38–73)
NRBC BLD-RTO: 0 /100 WBC
PLATELET # BLD AUTO: 304 K/UL (ref 150–450)
PMV BLD AUTO: 10.5 FL (ref 9.2–12.9)
POTASSIUM SERPL-SCNC: 4.2 MMOL/L (ref 3.5–5.1)
PROT SERPL-MCNC: 6.8 G/DL (ref 6–8.4)
RBC # BLD AUTO: 3.38 M/UL (ref 4–5.4)
SODIUM SERPL-SCNC: 140 MMOL/L (ref 136–145)
WBC # BLD AUTO: 8.49 K/UL (ref 3.9–12.7)

## 2022-06-10 PROCEDURE — 97110 THERAPEUTIC EXERCISES: CPT

## 2022-06-10 PROCEDURE — 80053 COMPREHEN METABOLIC PANEL: CPT | Performed by: INTERNAL MEDICINE

## 2022-06-10 PROCEDURE — 85025 COMPLETE CBC W/AUTO DIFF WBC: CPT | Performed by: INTERNAL MEDICINE

## 2022-06-10 PROCEDURE — 63700000 PHARM REV CODE 250 ALT 637 W/O HCPCS: Performed by: INTERNAL MEDICINE

## 2022-06-10 PROCEDURE — 94640 AIRWAY INHALATION TREATMENT: CPT

## 2022-06-10 PROCEDURE — 25000003 PHARM REV CODE 250: Performed by: NURSE PRACTITIONER

## 2022-06-10 PROCEDURE — 97530 THERAPEUTIC ACTIVITIES: CPT

## 2022-06-10 PROCEDURE — 99900031 HC PATIENT EDUCATION (STAT)

## 2022-06-10 PROCEDURE — 63600175 PHARM REV CODE 636 W HCPCS: Performed by: INTERNAL MEDICINE

## 2022-06-10 PROCEDURE — 36415 COLL VENOUS BLD VENIPUNCTURE: CPT | Performed by: INTERNAL MEDICINE

## 2022-06-10 PROCEDURE — 94761 N-INVAS EAR/PLS OXIMETRY MLT: CPT

## 2022-06-10 PROCEDURE — 11000001 HC ACUTE MED/SURG PRIVATE ROOM

## 2022-06-10 PROCEDURE — 63600175 PHARM REV CODE 636 W HCPCS: Performed by: NURSE PRACTITIONER

## 2022-06-10 PROCEDURE — 83735 ASSAY OF MAGNESIUM: CPT | Performed by: INTERNAL MEDICINE

## 2022-06-10 PROCEDURE — 25000003 PHARM REV CODE 250: Performed by: FAMILY MEDICINE

## 2022-06-10 PROCEDURE — 25000242 PHARM REV CODE 250 ALT 637 W/ HCPCS: Performed by: NURSE PRACTITIONER

## 2022-06-10 PROCEDURE — 99900035 HC TECH TIME PER 15 MIN (STAT)

## 2022-06-10 PROCEDURE — 25000003 PHARM REV CODE 250: Performed by: INTERNAL MEDICINE

## 2022-06-10 RX ORDER — IPRATROPIUM BROMIDE AND ALBUTEROL SULFATE 2.5; .5 MG/3ML; MG/3ML
3 SOLUTION RESPIRATORY (INHALATION)
Status: DISCONTINUED | OUTPATIENT
Start: 2022-06-10 | End: 2022-06-14 | Stop reason: HOSPADM

## 2022-06-10 RX ORDER — GUAIFENESIN 600 MG/1
1200 TABLET, EXTENDED RELEASE ORAL 2 TIMES DAILY
Status: DISCONTINUED | OUTPATIENT
Start: 2022-06-10 | End: 2022-06-14 | Stop reason: HOSPADM

## 2022-06-10 RX ADMIN — SPIRONOLACTONE 12.5 MG: 25 TABLET, FILM COATED ORAL at 08:06

## 2022-06-10 RX ADMIN — AMPICILLIN 2 G: 2 INJECTION, POWDER, FOR SOLUTION INTRAMUSCULAR; INTRAVENOUS at 09:06

## 2022-06-10 RX ADMIN — AMPICILLIN 2 G: 2 INJECTION, POWDER, FOR SOLUTION INTRAMUSCULAR; INTRAVENOUS at 05:06

## 2022-06-10 RX ADMIN — LEVOTHYROXINE SODIUM 150 MCG: 150 TABLET ORAL at 05:06

## 2022-06-10 RX ADMIN — HYDROMORPHONE HYDROCHLORIDE 2 MG: 2 INJECTION, SOLUTION INTRAMUSCULAR; INTRAVENOUS; SUBCUTANEOUS at 06:06

## 2022-06-10 RX ADMIN — IPRATROPIUM BROMIDE AND ALBUTEROL SULFATE 3 ML: 2.5; .5 SOLUTION RESPIRATORY (INHALATION) at 01:06

## 2022-06-10 RX ADMIN — HYDROMORPHONE HYDROCHLORIDE 2 MG: 2 INJECTION, SOLUTION INTRAMUSCULAR; INTRAVENOUS; SUBCUTANEOUS at 11:06

## 2022-06-10 RX ADMIN — AMIODARONE HYDROCHLORIDE 100 MG: 100 TABLET ORAL at 08:06

## 2022-06-10 RX ADMIN — GUAIFENESIN 1200 MG: 600 TABLET, EXTENDED RELEASE ORAL at 08:06

## 2022-06-10 RX ADMIN — METOPROLOL TARTRATE 50 MG: 50 TABLET, FILM COATED ORAL at 08:06

## 2022-06-10 RX ADMIN — HYDROCORTISONE: 0.01 CREAM TOPICAL at 08:06

## 2022-06-10 RX ADMIN — APIXABAN 5 MG: 5 TABLET, FILM COATED ORAL at 08:06

## 2022-06-10 RX ADMIN — HYDROMORPHONE HYDROCHLORIDE 2 MG: 2 INJECTION, SOLUTION INTRAMUSCULAR; INTRAVENOUS; SUBCUTANEOUS at 08:06

## 2022-06-10 RX ADMIN — PANTOPRAZOLE SODIUM 40 MG: 40 TABLET, DELAYED RELEASE ORAL at 08:06

## 2022-06-10 RX ADMIN — AMPICILLIN 2 G: 2 INJECTION, POWDER, FOR SOLUTION INTRAMUSCULAR; INTRAVENOUS at 01:06

## 2022-06-10 RX ADMIN — GABAPENTIN 300 MG: 300 CAPSULE ORAL at 08:06

## 2022-06-10 RX ADMIN — IPRATROPIUM BROMIDE AND ALBUTEROL SULFATE 3 ML: 2.5; .5 SOLUTION RESPIRATORY (INHALATION) at 08:06

## 2022-06-10 RX ADMIN — ALPRAZOLAM 0.5 MG: 0.5 TABLET ORAL at 08:06

## 2022-06-10 RX ADMIN — FLUCONAZOLE 200 MG: 100 TABLET ORAL at 08:06

## 2022-06-10 NOTE — ASSESSMENT & PLAN NOTE
This patient does have evidence of infective focus  My overall impression is sepsis. Vital signs were reviewed and noted in progress note.  Antibiotics given-   Antibiotics (From admission, onward)            Start     Stop Route Frequency Ordered    06/07/22 0945  ampicillin 2 g in sodium chloride 0.9 % 100 mL IVPB (ready to mix system)         -- IV Every 4 hours (non-standard times) 06/07/22 0838        Cultures were taken-   Microbiology Results (last 7 days)     Procedure Component Value Units Date/Time    Blood culture [[id number]] Collected: 06/04/22 1255    Order Status: Completed Specimen: Blood Updated: 06/09/22 2212     Blood Culture, Routine No growth after 5 days.    Blood culture [[id number]] Collected: 06/04/22 1301    Order Status: Completed Specimen: Blood Updated: 06/09/22 2212     Blood Culture, Routine No growth after 5 days.    Blood culture x two cultures. Draw prior to antibiotics. [[id number]]  (Abnormal) Collected: 06/01/22 1019    Order Status: Completed Specimen: Blood Updated: 06/04/22 1251     Blood Culture, Routine Gram stain aer bottle: Gram positive cocci in chains resembling Strep       Gram stain nita bottle: Gram positive cocci in chains resembling Strep       Positive results previously called 06/02/2022  05:14      ENTEROCOCCUS FAECALIS  For susceptibility see order # [id number]      Narrative:      Aerobic and anaerobic    Blood culture x two cultures. Draw prior to antibiotics. [[id number]]  (Abnormal)  (Susceptibility) Collected: 06/01/22 1002    Order Status: Completed Specimen: Blood Updated: 06/04/22 1250     Blood Culture, Routine Gram stain nita bottle: Gram positive cocci in chains resembling Strep       Results called to and read back by: Karen Davis RN 06/02/2022        03:36      Gram stain aer bottle: Gram positive cocci in chains resembling Strep       Positive results previously called 06/02/2022  05:15      ENTEROCOCCUS FAECALIS    Narrative:      Aerobic and  anaerobic        Latest lactate reviewed, they are-  No results for input(s): LACTATE in the last 72 hours.    Organ dysfunction indicated by Acute respiratory failure  Source- uti, bacteremia    Source control Achieved by- iv abxs  6/3 cont iv abxs- follow sensitivites  6/4:  Awaiting sensitivities.  Draw surveillance cultures.   6/5:  Sensitive to vancomycin.  Surveillance cultures negative thus far.  6/6: Continue abx therapy with Rocephin and Vancomycin (day 5). Monitoring repeat blood cultures  6/7: Downgrade abx therpy to Ampicillin - IV abx therapy day 6  6/10: IV abx therapy day 9 with Ampicillin

## 2022-06-10 NOTE — PT/OT/SLP PROGRESS
Physical Therapy Treatment    Patient Name:  Aurelia Ruggiero   MRN:  [id number]    Recommendations:     Discharge Recommendations:      Discharge Equipment Recommendations:     Barriers to discharge: decreased fxn'l mobility and weakness    Assessment:     Aurelia Ruggiero is a 67 y.o. female admitted with a medical diagnosis of Sepsis.  She presents with the following impairments/functional limitations:    Pt did better with supine to sit to day and is giving god effort.    Rehab Prognosis: Fair; patient would benefit from acute skilled PT services to address these deficits and reach maximum level of function.    Recent Surgery: * No surgery found *      Plan:     During this hospitalization, patient to be seen   to address the identified rehab impairments via   and progress toward the following goals:    · Plan of Care Expires:       Subjective     Chief Complaint: weakness in extrenities  Patient/Family Comments/goals:   Pain/Comfort:  ·        Objective:     Communicated with nurse prior to session.  Patient found supine with   upon PT entry to room.     General Precautions: Standard,     Orthopedic Precautions:    Braces:    Respiratory Status: Room air     Functional Mobility:  · Bed Mobility:     · Scooting: maximal assistance  · Supine to Sit: moderate assistance  · Sit to Supine: maximal assistance  · Transfers:     · Sit to Stand:  maximal assistance with no AD  · Bed to Chair: total assistance with  no AD  using  Stand Pivot  · Gait:  pt still not able to walk      AM-PAC 6 CLICK MOBILITY          Therapeutic Activities and Exercises:   pt performed trunk and BLE strength exs in supported and unsupported sitting at EOB    Patient left supine with call button in reach..    GOALS:   Multidisciplinary Problems     Physical Therapy Goals        Problem: Physical Therapy    Goal Priority Disciplines Outcome Goal Variances Interventions   Physical Therapy Goal     PT, PT/OT      Description: Goals to be met by:  22    Patient will increase functional independence with mobility by performin. Supine to sit with Moderate Assistance  2. Sit to supine with Moderate Assistance  3. Rolling to Left and Right with Moderate Assistance.  4. Sitting at edge of bed x10 minutes with Minimal Assistance                     Time Tracking:     PT Received On:    PT Start Time: 1015     PT Stop Time: 1043  PT Total Time (min): 28 min     Billable Minutes: Therapeutic Activity 15 and Therapeutic Exercise 13                   06/10/2022

## 2022-06-10 NOTE — SUBJECTIVE & OBJECTIVE
Interval History: Pt seen and evaluated    Review of Systems   Constitutional:  Positive for activity change and fatigue. Negative for fever.   HENT:  Negative for sinus pressure and sinus pain.    Respiratory:  Positive for cough and wheezing. Negative for chest tightness and shortness of breath.    Cardiovascular:  Negative for chest pain, palpitations and leg swelling.   Gastrointestinal:  Negative for abdominal pain, blood in stool, constipation, diarrhea, nausea and vomiting.   Musculoskeletal:  Positive for arthralgias, back pain and gait problem.   Skin:  Positive for rash. Negative for wound.   Allergic/Immunologic: Positive for environmental allergies.   Neurological:  Positive for weakness. Negative for seizures and syncope.   Objective:     Vital Signs (Most Recent):  Temp: 98 °F (36.7 °C) (06/10/22 1159)  Pulse: 66 (06/10/22 1159)  Resp: 18 (06/10/22 1159)  BP: (!) 112/55 (06/10/22 1159)  SpO2: 96 % (06/10/22 1159)   Vital Signs (24h Range):  Temp:  [98 °F (36.7 °C)-98.8 °F (37.1 °C)] 98 °F (36.7 °C)  Pulse:  [] 66  Resp:  [18-20] 18  SpO2:  [90 %-97 %] 96 %  BP: (109-117)/(55-65) 112/55     Weight: 136.1 kg (300 lb)  Body mass index is 51.49 kg/m².    Intake/Output Summary (Last 24 hours) at 6/10/2022 1210  Last data filed at 6/10/2022 0700  Gross per 24 hour   Intake 1880 ml   Output 600 ml   Net 1280 ml        Physical Exam  Constitutional:       Appearance: She is obese.   HENT:      Head: Normocephalic.      Nose: Nose normal.      Mouth/Throat:      Mouth: Mucous membranes are moist.   Eyes:      Extraocular Movements: Extraocular movements intact.      Pupils: Pupils are equal, round, and reactive to light.   Cardiovascular:      Rate and Rhythm: Normal rate and regular rhythm.   Pulmonary:      Effort: Pulmonary effort is normal.      Breath sounds: Wheezing present.   Abdominal:      General: Bowel sounds are normal. There is no distension.      Palpations: Abdomen is soft.    Musculoskeletal:      Cervical back: Normal range of motion.   Skin:     General: Skin is warm and dry.      Findings: Lesion and rash present.   Neurological:      Mental Status: She is alert and oriented to person, place, and time.       Significant Labs: All pertinent labs within the past 24 hours have been reviewed.    CBC:   Recent Labs   Lab 06/09/22  0441 06/10/22  0541   WBC 8.03 8.49   HGB 11.8* 10.6*   HCT 36.6* 32.9*    304       CMP:   Recent Labs   Lab 06/09/22  0441 06/10/22  0541    140   K 4.5 4.2    106   CO2 31* 28   * 99   BUN 20 19   CREATININE 1.3 1.1   CALCIUM 8.6* 8.4*   PROT 7.4 6.8   ALBUMIN 2.4* 2.1*   BILITOT 0.5 0.5   ALKPHOS 98 95   AST 18 18   ALT 18 16   ANIONGAP 4* 6*   EGFRNONAA 42.0* 51.3*       Magnesium:   Recent Labs   Lab 06/09/22  0441 06/10/22  0541   MG 2.5 2.4         Significant Imaging: I have reviewed all pertinent imaging results/findings within the past 24 hours.

## 2022-06-10 NOTE — ASSESSMENT & PLAN NOTE
Patient with Persistent (7 days or more) atrial fibrillation which is controlled currently with Beta Blocker and Amiodarone. Patient is currently in atrial fibrillation.[id number] Score: 2. Anticoagulation indicated. Anticoagulation done with eliquis.  Cards helpign due to recent rvr afib and recent pulm edema

## 2022-06-10 NOTE — PROGRESS NOTES
[facility]  Progress Note    Patient Name: Aurelia Ruggiero  MRN: [id number]  Patient Class: IP- Inpatient   Admission Date: 6/1/2022  Length of Stay: 9 days  Attending Physician: Marimar Kellogg MD  Primary Care Provider: Marimar Kellogg MD        Subjective:     Principal Problem:Sepsis        HPI:  67-year-old female presents to the ED via EMS with reports of worsening lower back pain.  Patient denies any falls or trauma reports that pain starts and left lower back and radiates down to left leg.  Patient reports she has a history of arthritis but denies any surgeries or procedures on back.  EMS reports patient was noted to be O2 sat on room air of 88%.  Patient has a history of AFib but reports not taking medications this morning.  Patient's PCP is Dr. Kellogg       Overview/Hospital Course:  6/3 ND pt more peppy this am - still havign lower back and side pain.   Informed pt and son of her sepsis dx and need for iv abxs.  Breathing is better per pt.  6/4 WC:  Tolerating treatment well thus far.  Still very fatigued.   6/5 WC:  Surveillance cultures negative thus far.  Patient remains fatigued with diffuse pain.  6/6 SD: Back pain improved, moving more with less pain - continue therapy efforts. Repeat blood cultures in process. Continue abx therapy (day 5).  6/7 SD: Downgrade abx therapy to ampicillin. Rash to back - pt feels related to laundry detergent - Benadryl PRN.  6/8 SD: Continue abx therapy. Rash improved. Continue therapy efforts.  6/9 SD: Continue abx therapy. Improved effort with OT yesterday. Continue PT/OT.  6/10 SD: C/O cough and wheezing. Consult for IPR. Continue abx therapy.      Interval History: Pt seen and evaluated    Review of Systems   Constitutional:  Positive for activity change and fatigue. Negative for fever.   HENT:  Negative for sinus pressure and sinus pain.    Respiratory:  Positive for cough and wheezing. Negative for chest tightness and shortness of  breath.    Cardiovascular:  Negative for chest pain, palpitations and leg swelling.   Gastrointestinal:  Negative for abdominal pain, blood in stool, constipation, diarrhea, nausea and vomiting.   Musculoskeletal:  Positive for arthralgias, back pain and gait problem.   Skin:  Positive for rash. Negative for wound.   Allergic/Immunologic: Positive for environmental allergies.   Neurological:  Positive for weakness. Negative for seizures and syncope.   Objective:     Vital Signs (Most Recent):  Temp: 98 °F (36.7 °C) (06/10/22 1159)  Pulse: 66 (06/10/22 1159)  Resp: 18 (06/10/22 1159)  BP: (!) 112/55 (06/10/22 1159)  SpO2: 96 % (06/10/22 1159)   Vital Signs (24h Range):  Temp:  [98 °F (36.7 °C)-98.8 °F (37.1 °C)] 98 °F (36.7 °C)  Pulse:  [] 66  Resp:  [18-20] 18  SpO2:  [90 %-97 %] 96 %  BP: (109-117)/(55-65) 112/55     Weight: 136.1 kg (300 lb)  Body mass index is 51.49 kg/m².    Intake/Output Summary (Last 24 hours) at 6/10/2022 1210  Last data filed at 6/10/2022 0700  Gross per 24 hour   Intake 1880 ml   Output 600 ml   Net 1280 ml        Physical Exam  Constitutional:       Appearance: She is obese.   HENT:      Head: Normocephalic.      Nose: Nose normal.      Mouth/Throat:      Mouth: Mucous membranes are moist.   Eyes:      Extraocular Movements: Extraocular movements intact.      Pupils: Pupils are equal, round, and reactive to light.   Cardiovascular:      Rate and Rhythm: Normal rate and regular rhythm.   Pulmonary:      Effort: Pulmonary effort is normal.      Breath sounds: Wheezing present.   Abdominal:      General: Bowel sounds are normal. There is no distension.      Palpations: Abdomen is soft.   Musculoskeletal:      Cervical back: Normal range of motion.   Skin:     General: Skin is warm and dry.      Findings: Lesion and rash present.   Neurological:      Mental Status: She is alert and oriented to person, place, and time.       Significant Labs: All pertinent labs within the past 24 hours  have been reviewed.    CBC:   Recent Labs   Lab 06/09/22  0441 06/10/22  0541   WBC 8.03 8.49   HGB 11.8* 10.6*   HCT 36.6* 32.9*    304       CMP:   Recent Labs   Lab 06/09/22  0441 06/10/22  0541    140   K 4.5 4.2    106   CO2 31* 28   * 99   BUN 20 19   CREATININE 1.3 1.1   CALCIUM 8.6* 8.4*   PROT 7.4 6.8   ALBUMIN 2.4* 2.1*   BILITOT 0.5 0.5   ALKPHOS 98 95   AST 18 18   ALT 18 16   ANIONGAP 4* 6*   EGFRNONAA 42.0* 51.3*       Magnesium:   Recent Labs   Lab 06/09/22  0441 06/10/22  0541   MG 2.5 2.4         Significant Imaging: I have reviewed all pertinent imaging results/findings within the past 24 hours.      Assessment/Plan:      * Sepsis  This patient does have evidence of infective focus  My overall impression is sepsis. Vital signs were reviewed and noted in progress note.  Antibiotics given-   Antibiotics (From admission, onward)            Start     Stop Route Frequency Ordered    06/07/22 0945  ampicillin 2 g in sodium chloride 0.9 % 100 mL IVPB (ready to mix system)         -- IV Every 4 hours (non-standard times) 06/07/22 0838        Cultures were taken-   Microbiology Results (last 7 days)     Procedure Component Value Units Date/Time    Blood culture [[id number]] Collected: 06/04/22 1255    Order Status: Completed Specimen: Blood Updated: 06/09/22 2212     Blood Culture, Routine No growth after 5 days.    Blood culture [[id number]] Collected: 06/04/22 1301    Order Status: Completed Specimen: Blood Updated: 06/09/22 2212     Blood Culture, Routine No growth after 5 days.    Blood culture x two cultures. Draw prior to antibiotics. [[id number]]  (Abnormal) Collected: 06/01/22 1019    Order Status: Completed Specimen: Blood Updated: 06/04/22 1251     Blood Culture, Routine Gram stain aer bottle: Gram positive cocci in chains resembling Strep       Gram stain nita bottle: Gram positive cocci in chains resembling Strep       Positive results previously called 06/02/2022  05:14       ENTEROCOCCUS FAECALIS  For susceptibility see order # [id number]      Narrative:      Aerobic and anaerobic    Blood culture x two cultures. Draw prior to antibiotics. [[id number]]  (Abnormal)  (Susceptibility) Collected: 06/01/22 1002    Order Status: Completed Specimen: Blood Updated: 06/04/22 1250     Blood Culture, Routine Gram stain nita bottle: Gram positive cocci in chains resembling Strep       Results called to and read back by: Karen Davis RN 06/02/2022        03:36      Gram stain aer bottle: Gram positive cocci in chains resembling Strep       Positive results previously called 06/02/2022  05:15      ENTEROCOCCUS FAECALIS    Narrative:      Aerobic and anaerobic        Latest lactate reviewed, they are-  No results for input(s): LACTATE in the last 72 hours.    Organ dysfunction indicated by Acute respiratory failure  Source- uti, bacteremia    Source control Achieved by- iv abxs  6/3 cont iv abxs- follow sensitivites  6/4:  Awaiting sensitivities.  Draw surveillance cultures.   6/5:  Sensitive to vancomycin.  Surveillance cultures negative thus far.  6/6: Continue abx therapy with Rocephin and Vancomycin (day 5). Monitoring repeat blood cultures  6/7: Downgrade abx therpy to Ampicillin - IV abx therapy day 6  6/10: IV abx therapy day 9 with Ampicillin    Rash  Pt suspects R/T laundry detergent. Bendaryl PRN.      Myopathy  Continue therapy efforts.      Hypokalemia  Improved. Monitor daily labs and adjust repletion as needed.    Recent Labs   Lab 06/09/22  0441 06/10/22  0541   K 4.5 4.2         Abdominal pain  CT of abd/pelvis - was negative  Resolved      Longstanding persistent atrial fibrillation  Patient with Persistent (7 days or more) atrial fibrillation which is controlled currently with Beta Blocker and Amiodarone. Patient is currently in atrial fibrillation.[id number] Score: 2. Anticoagulation indicated. Anticoagulation done with eliquis.  Cards helpign due to recent rvr afib and recent  pulm edema      Lumbar disc disease with radiculopathy  Pt with severe back pain - CT of back shows no fractures but severe degenerative dz  Continue therapy efforts      Acute pulmonary edema  Resolved        VTE Risk Mitigation (From admission, onward)         Ordered     apixaban tablet 5 mg  2 times daily         06/02/22 1742     IP VTE HIGH RISK PATIENT  Once         06/01/22 1440     Place sequential compression device  Until discontinued         06/01/22 1440                Discharge Planning   PARISH:      Code Status: Full Code   Is the patient medically ready for discharge?:     Reason for patient still in hospital (select all that apply): Patient trending condition, Laboratory test and Treatment  Discharge Plan A: Home, Home Health                  Marizol Hernández NP  Department of Hospital Medicine   [facility]

## 2022-06-10 NOTE — ASSESSMENT & PLAN NOTE
Improved. Monitor daily labs and adjust repletion as needed.    Recent Labs   Lab 06/09/22  0441 06/10/22  0541   K 4.5 4.2

## 2022-06-10 NOTE — PLAN OF CARE
Spoke to the patient about her discharge plan of care. Therapy is recommending swing bed placement. Physician was notified. The patient is open to going to [place] or [place].

## 2022-06-10 NOTE — PT/OT/SLP PROGRESS
Occupational Therapy   Treatment    Name: Aurelia Ruggiero  MRN: [id number]  Admitting Diagnosis:  Sepsis       Recommendations:     Discharge Recommendations: rehabilitation facility  Discharge Equipment Recommendations:  bedside commode  Barriers to discharge:  Other (Comment) (Medical and functional status)    Assessment:     Aurelia Ruggiero is a 67 y.o. female with a medical diagnosis of Sepsis. Performance deficits affecting function are weakness, impaired endurance, impaired self care skills, impaired functional mobilty, impaired balance, decreased lower extremity function, decreased safety awareness, pain, decreased ROM, decreased coordination, impaired skin, edema, impaired cardiopulmonary response to activity.     Rehab Prognosis:  Good; patient would benefit from acute skilled OT services to address these deficits and reach maximum level of function.       Plan:     Patient to be seen 6 x/week to address the above listed problems via self-care/home management, therapeutic activities, therapeutic exercises, neuromuscular re-education  · Plan of Care Expires: 06/17/22  · Plan of Care Reviewed with: patient    Subjective     Pain/Comfort:  · Pain Rating 1: 8/10  · Location - Orientation 1: lower  · Location 1: back  · Pain Addressed 1: Reposition, Cessation of Activity, Pre-medicate for activity, Nurse notified  · Pain Rating Post-Intervention 1: 5/10    Objective:     Communicated with: nurse prior to session.  Patient found supine with telemetry, PureWick upon OT entry to room.    General Precautions: Standard, fall   Orthopedic Precautions:N/A   Braces:    Respiratory Status: Room air     Occupational Performance:     Bed Mobility:    · Patient completed Rolling/Turning to Left with  moderate assistance  · Patient completed Rolling/Turning to Right with moderate assistance  · Patient completed Scooting/Bridging with maximal assistance  · Patient completed Supine to Sit with maximal assistance  · Patient completed  Sit to Supine with moderate assistance     Functional Mobility/Transfers:  · Patient completed Sit <> Stand Transfer with maximal assistance  with  no assistive device   · Patient completed Bed <> Chair Transfer using Stand Pivot technique with total assistance with no assistive device  · Functional Mobility: Pt unable to ambulate at this time.     Treatment & Education:  Pt was cooperative and motivated with verbal encouragement while exhibiting positive affect despite apprehension with activity due to pain. She participated in bed mobility retraining emphasizing technique in order to reduce pain providing much extra time with repetition requiring mod to max assist secondary to much lifting assist and stabilization of trunk with additional assist with bilateral LE's off EOB during supine to sit transition, much scooting assist at bilateral hips to EOB, and decreased lifting assist of bilateral LE's during sit to supine transition with verbal and tactile cueing for technique utilizing bedrails. Pt then participated in functional transfer retraining to / from bed and chair emphasizing fall prevention providing extra time requiring max to total assist secondary to much lifting, lowering, and steadying assist with continuous verbal and tactile cueing for safety and technique. Also, she participated in therapeutic activity addressing trunk control, trunk strength, static / dynamic sitting balance, functional reaching, and energy for task / endurance challenging her to sustain upright seated position at EOB unsupported while intermittently reaching in multiple planes utilizing bilateral UE's requiring min to mod steadying assist with minimal excursions of trunk with additional cueing for proper weight shifting due to posterior lean impacted by pain and to facilitate optimal movement patterns.     Patient left HOB elevated with all lines intact, call button in reach and nurse notifiedEducation:      GOALS:    Multidisciplinary Problems     Occupational Therapy Goals        Problem: Occupational Therapy    Goal Priority Disciplines Outcome Interventions   Occupational Therapy Goal     OT, PT/OT Ongoing, Progressing    Description: Goals to be met by: discharge    Patient will increase functional independence with ADLs by performing:    UE Dressing with Minimal Assistance.  LE Dressing with Minimal Assistance.  Toileting from toilet with Minimal Assistance for hygiene and clothing management.   Sitting at edge of bed x10 minutes with Minimal Assistance.  Supine to sit with Minimal Assistance.                     Time Tracking:     OT Date of Treatment: 06/10/22  OT Start Time: 0935  OT Stop Time: 1015  OT Total Time (min): 40 min    Billable Minutes:Therapeutic Activity 40    OT/CARI: OT     CARI Visit Number: 1    6/10/2022

## 2022-06-11 LAB
ALBUMIN SERPL BCP-MCNC: 2.3 G/DL (ref 3.5–5.2)
ALP SERPL-CCNC: 101 U/L (ref 55–135)
ALT SERPL W/O P-5'-P-CCNC: 17 U/L (ref 10–44)
ANION GAP SERPL CALC-SCNC: 7 MMOL/L (ref 8–16)
AST SERPL-CCNC: 20 U/L (ref 10–40)
BASOPHILS # BLD AUTO: 0.1 K/UL (ref 0–0.2)
BASOPHILS NFR BLD: 1.2 % (ref 0–1.9)
BILIRUB SERPL-MCNC: 0.8 MG/DL (ref 0.1–1)
BUN SERPL-MCNC: 17 MG/DL (ref 8–23)
CALCIUM SERPL-MCNC: 8.6 MG/DL (ref 8.7–10.5)
CHLORIDE SERPL-SCNC: 105 MMOL/L (ref 95–110)
CO2 SERPL-SCNC: 29 MMOL/L (ref 23–29)
CREAT SERPL-MCNC: 0.9 MG/DL (ref 0.5–1.4)
DIFFERENTIAL METHOD: ABNORMAL
EOSINOPHIL # BLD AUTO: 0.3 K/UL (ref 0–0.5)
EOSINOPHIL NFR BLD: 3.2 % (ref 0–8)
ERYTHROCYTE [DISTWIDTH] IN BLOOD BY AUTOMATED COUNT: 14.6 % (ref 11.5–14.5)
EST. GFR  (AFRICAN AMERICAN): >60 ML/MIN/1.73 M^2
EST. GFR  (NON AFRICAN AMERICAN): >60 ML/MIN/1.73 M^2
GLUCOSE SERPL-MCNC: 86 MG/DL (ref 70–110)
HCT VFR BLD AUTO: 34.3 % (ref 37–48.5)
HGB BLD-MCNC: 11 G/DL (ref 12–16)
IMM GRANULOCYTES # BLD AUTO: 0.09 K/UL (ref 0–0.04)
IMM GRANULOCYTES NFR BLD AUTO: 1.1 % (ref 0–0.5)
LYMPHOCYTES # BLD AUTO: 2.4 K/UL (ref 1–4.8)
LYMPHOCYTES NFR BLD: 28.7 % (ref 18–48)
MAGNESIUM SERPL-MCNC: 2.1 MG/DL (ref 1.6–2.6)
MCH RBC QN AUTO: 30.8 PG (ref 27–31)
MCHC RBC AUTO-ENTMCNC: 32.1 G/DL (ref 32–36)
MCV RBC AUTO: 96 FL (ref 82–98)
MONOCYTES # BLD AUTO: 0.8 K/UL (ref 0.3–1)
MONOCYTES NFR BLD: 9.5 % (ref 4–15)
NEUTROPHILS # BLD AUTO: 4.7 K/UL (ref 1.8–7.7)
NEUTROPHILS NFR BLD: 56.3 % (ref 38–73)
NRBC BLD-RTO: 0 /100 WBC
PLATELET # BLD AUTO: 331 K/UL (ref 150–450)
PMV BLD AUTO: 10.5 FL (ref 9.2–12.9)
POTASSIUM SERPL-SCNC: 4.2 MMOL/L (ref 3.5–5.1)
PROT SERPL-MCNC: 7.2 G/DL (ref 6–8.4)
RBC # BLD AUTO: 3.57 M/UL (ref 4–5.4)
SODIUM SERPL-SCNC: 141 MMOL/L (ref 136–145)
WBC # BLD AUTO: 8.32 K/UL (ref 3.9–12.7)

## 2022-06-11 PROCEDURE — 63600175 PHARM REV CODE 636 W HCPCS: Performed by: INTERNAL MEDICINE

## 2022-06-11 PROCEDURE — 94761 N-INVAS EAR/PLS OXIMETRY MLT: CPT

## 2022-06-11 PROCEDURE — 25000003 PHARM REV CODE 250: Performed by: INTERNAL MEDICINE

## 2022-06-11 PROCEDURE — 99233 SBSQ HOSP IP/OBS HIGH 50: CPT | Mod: ,,, | Performed by: STUDENT IN AN ORGANIZED HEALTH CARE EDUCATION/TRAINING PROGRAM

## 2022-06-11 PROCEDURE — 11000001 HC ACUTE MED/SURG PRIVATE ROOM

## 2022-06-11 PROCEDURE — 99233 PR SUBSEQUENT HOSPITAL CARE,LEVL III: ICD-10-PCS | Mod: ,,, | Performed by: STUDENT IN AN ORGANIZED HEALTH CARE EDUCATION/TRAINING PROGRAM

## 2022-06-11 PROCEDURE — 36415 COLL VENOUS BLD VENIPUNCTURE: CPT | Performed by: INTERNAL MEDICINE

## 2022-06-11 PROCEDURE — 25000003 PHARM REV CODE 250: Performed by: NURSE PRACTITIONER

## 2022-06-11 PROCEDURE — 25000242 PHARM REV CODE 250 ALT 637 W/ HCPCS: Performed by: NURSE PRACTITIONER

## 2022-06-11 PROCEDURE — 94640 AIRWAY INHALATION TREATMENT: CPT

## 2022-06-11 PROCEDURE — 85025 COMPLETE CBC W/AUTO DIFF WBC: CPT | Performed by: INTERNAL MEDICINE

## 2022-06-11 PROCEDURE — 80053 COMPREHEN METABOLIC PANEL: CPT | Performed by: INTERNAL MEDICINE

## 2022-06-11 PROCEDURE — 83735 ASSAY OF MAGNESIUM: CPT | Performed by: INTERNAL MEDICINE

## 2022-06-11 PROCEDURE — 25000003 PHARM REV CODE 250: Performed by: FAMILY MEDICINE

## 2022-06-11 PROCEDURE — 63600175 PHARM REV CODE 636 W HCPCS: Performed by: NURSE PRACTITIONER

## 2022-06-11 PROCEDURE — 97530 THERAPEUTIC ACTIVITIES: CPT

## 2022-06-11 PROCEDURE — 99900035 HC TECH TIME PER 15 MIN (STAT)

## 2022-06-11 PROCEDURE — 97110 THERAPEUTIC EXERCISES: CPT

## 2022-06-11 PROCEDURE — 99900031 HC PATIENT EDUCATION (STAT)

## 2022-06-11 PROCEDURE — 63700000 PHARM REV CODE 250 ALT 637 W/O HCPCS: Performed by: INTERNAL MEDICINE

## 2022-06-11 RX ADMIN — PANTOPRAZOLE SODIUM 40 MG: 40 TABLET, DELAYED RELEASE ORAL at 09:06

## 2022-06-11 RX ADMIN — HYDROMORPHONE HYDROCHLORIDE 2 MG: 2 INJECTION, SOLUTION INTRAMUSCULAR; INTRAVENOUS; SUBCUTANEOUS at 09:06

## 2022-06-11 RX ADMIN — IPRATROPIUM BROMIDE AND ALBUTEROL SULFATE 3 ML: 2.5; .5 SOLUTION RESPIRATORY (INHALATION) at 07:06

## 2022-06-11 RX ADMIN — FLUCONAZOLE 200 MG: 100 TABLET ORAL at 09:06

## 2022-06-11 RX ADMIN — AMPICILLIN 2 G: 2 INJECTION, POWDER, FOR SOLUTION INTRAMUSCULAR; INTRAVENOUS at 09:06

## 2022-06-11 RX ADMIN — ALPRAZOLAM 0.5 MG: 0.5 TABLET ORAL at 09:06

## 2022-06-11 RX ADMIN — APIXABAN 5 MG: 5 TABLET, FILM COATED ORAL at 09:06

## 2022-06-11 RX ADMIN — LEVOTHYROXINE SODIUM 150 MCG: 150 TABLET ORAL at 06:06

## 2022-06-11 RX ADMIN — IPRATROPIUM BROMIDE AND ALBUTEROL SULFATE 3 ML: 2.5; .5 SOLUTION RESPIRATORY (INHALATION) at 01:06

## 2022-06-11 RX ADMIN — AMPICILLIN 2 G: 2 INJECTION, POWDER, FOR SOLUTION INTRAMUSCULAR; INTRAVENOUS at 02:06

## 2022-06-11 RX ADMIN — HYDROMORPHONE HYDROCHLORIDE 2 MG: 2 INJECTION, SOLUTION INTRAMUSCULAR; INTRAVENOUS; SUBCUTANEOUS at 02:06

## 2022-06-11 RX ADMIN — SPIRONOLACTONE 12.5 MG: 25 TABLET, FILM COATED ORAL at 09:06

## 2022-06-11 RX ADMIN — METOPROLOL TARTRATE 50 MG: 50 TABLET, FILM COATED ORAL at 09:06

## 2022-06-11 RX ADMIN — HYDROCORTISONE: 0.01 CREAM TOPICAL at 09:06

## 2022-06-11 RX ADMIN — AMPICILLIN 2 G: 2 INJECTION, POWDER, FOR SOLUTION INTRAMUSCULAR; INTRAVENOUS at 06:06

## 2022-06-11 RX ADMIN — AMPICILLIN 2 G: 2 INJECTION, POWDER, FOR SOLUTION INTRAMUSCULAR; INTRAVENOUS at 05:06

## 2022-06-11 RX ADMIN — AMIODARONE HYDROCHLORIDE 100 MG: 100 TABLET ORAL at 09:06

## 2022-06-11 RX ADMIN — GUAIFENESIN 1200 MG: 600 TABLET, EXTENDED RELEASE ORAL at 09:06

## 2022-06-11 RX ADMIN — AMPICILLIN 2 G: 2 INJECTION, POWDER, FOR SOLUTION INTRAMUSCULAR; INTRAVENOUS at 01:06

## 2022-06-11 RX ADMIN — GABAPENTIN 300 MG: 300 CAPSULE ORAL at 09:06

## 2022-06-11 NOTE — PLAN OF CARE
Plan of care reviewed with patient. Concerns were addressed and questions were answered. Patient verbalized understanding.

## 2022-06-11 NOTE — PT/OT/SLP PROGRESS
Physical Therapy Treatment    Patient Name:  Aurelia Ruggiero   MRN:  [id number]    Recommendations:     Discharge Recommendations:    to be determined  Discharge Equipment Recommendations:   to be determined  Barriers to discharge: current medical and functional status    Assessment:     Aurelia Ruggiero is a 67 y.o. female admitted with a medical diagnosis of Sepsis.  She presents with the following impairments/functional limitations:    decreased functional activity tolerance and strength as well as increased pain impacting gait, transfers, balance, coordination, safety and overall functional mobility.    Rehab Prognosis: Fair; patient would benefit from acute skilled PT services to address these deficits and reach maximum level of function.    Recent Surgery: * No surgery found *      Plan:     During this hospitalization, patient to be seen   up to 6 days per week to address the identified rehab impairments via  therapeutic exercise and activities including gait and progress toward the following goals:  ·     Subjective     Chief Complaint: patient complains of overall fatigue and (L) side LBP  Patient/Family Comments/goals: patient goal is to perform rehab and be able to discharge home  Pain/Comfort:  ·  7/10 (L) side back      Objective:     Communicated with nurse prior to session.  Patient found HOB elevated with   upon PT entry to room.     General Precautions: Standard,     Orthopedic Precautions:  n/a  Braces:  n/a   Functional Mobility:  · Bed Mobility:     · Scooting: maximal assistance  · Supine to Sit: total assistance        Therapeutic Activities and Exercises:   patient seen at bedside. Patient performed (B) LE therex in bed x20 reps in all planes to facilitate ROM and strengthening. Patient with HOB elevated and needed total lifting assistance to sit forward to reposition pillow behind head prior to exercises for patient comfort. After exercises patient bed flattened and Patient repositioned in bed using  bridging and scooting and patient using UEs to pull with bed rails with max assist with draw sheet to position patient higher in bed.     Patient left HOB elevated with all lines intact..    GOALS:   Multidisciplinary Problems     Physical Therapy Goals        Problem: Physical Therapy    Goal Priority Disciplines Outcome Goal Variances Interventions   Physical Therapy Goal     PT, PT/OT      Description: Goals to be met by: 22    Patient will increase functional independence with mobility by performin. Supine to sit with Moderate Assistance  2. Sit to supine with Moderate Assistance  3. Rolling to Left and Right with Moderate Assistance.  4. Sitting at edge of bed x10 minutes with Minimal Assistance                     Time Tracking:     PT Received On:  2022  PT Start Time:     915  PT Stop Time:  938  PT Total Time (min):   23  Billable Minutes: Therapeutic Activity 8 and Therapeutic Exercise 15                   2022

## 2022-06-11 NOTE — PT/OT/SLP PROGRESS
Occupational Therapy   Treatment    Name: Aurelia Ruggiero  MRN: [id number]  Admitting Diagnosis:  Sepsis       Recommendations:     Discharge Recommendations: rehabilitation facility  Discharge Equipment Recommendations:  bedside commode  Barriers to discharge:  Other (Comment) (Medical and functional status)    Assessment:     Aurelia Ruggiero is a 67 y.o. female with a medical diagnosis of Sepsis. Performance deficits affecting function are weakness, impaired endurance, impaired self care skills, impaired functional mobilty, impaired balance, decreased lower extremity function, decreased safety awareness, pain, decreased ROM, decreased coordination, impaired skin, edema, impaired cardiopulmonary response to activity.     Rehab Prognosis:  Good; patient would benefit from acute skilled OT services to address these deficits and reach maximum level of function.       Plan:     Patient to be seen 6 x/week to address the above listed problems via self-care/home management, therapeutic activities, therapeutic exercises, neuromuscular re-education  · Plan of Care Expires: 06/17/22  · Plan of Care Reviewed with: patient    Subjective     Pain/Comfort:  · Pain Rating 1: 9/10  · Location - Orientation 1: lower  · Location 1: back  · Pain Addressed 1: Pre-medicate for activity, Reposition, Cessation of Activity, Nurse notified  · Pain Rating Post-Intervention 1: 8/10    Objective:     Communicated with: nurse prior to session.  Patient found supine with telemetry, Pure[employer]ick upon OT entry to room.    General Precautions: Standard, fall   Orthopedic Precautions:N/A   Braces:    Respiratory Status: Room air     Occupational Performance:     Bed Mobility:    · Patient completed Rolling/Turning to Left with  moderate assistance  · Patient completed Rolling/Turning to Right with moderate assistance  · Patient completed Scooting/Bridging with maximal assistance  · Patient completed Supine to Sit with moderate assistance  · Patient  completed Sit to Supine with maximal assistance     Functional Mobility/Transfers:  · Patient completed Sit <> Stand Transfer with maximal assistance  with  no assistive device   · Patient completed Bed <> Chair Transfer using Stand Pivot technique with total assistance with no assistive device  · Functional Mobility: Pt unable to ambulate at this time.      Treatment & Education:  Pt was cooperative and motivated with verbal encouragement while exhibiting positive affect despite continued apprehension with activity due to pain. She participated in bed mobility retraining emphasizing technique in order to reduce pain providing much extra time with repetition requiring mod to max assist secondary to decreased lifting assist and stabilization of trunk with additional assist with bilateral LE's off EOB during supine to sit transition, much scooting assist at bilateral hips to EOB, and lifting assist of bilateral LE's during sit to supine transition with verbal and tactile cueing for technique utilizing bedrails. Pt then participated in therapeutic activity addressing trunk control, trunk strength, static / dynamic sitting balance, functional reaching, and energy for task / endurance challenging her to sustain upright seated position at EOB unsupported while intermittently reaching in multiple planes utilizing bilateral UE's requiring decreased steadying assist with minimal excursions of trunk with additional cueing for proper weight shifting due to posterior lean impacted by pain. Lastly, she participated in functional transfer retraining to / from bed and chair emphasizing fall prevention providing extra time requiring max to total assist secondary to much lifting, lowering, and steadying assist with continuous verbal and tactile cueing for safety and technique.        Patient left HOB elevated with all lines intact, call button in reach and nurse notifiedEducation:      GOALS:   Multidisciplinary Problems      Occupational Therapy Goals        Problem: Occupational Therapy    Goal Priority Disciplines Outcome Interventions   Occupational Therapy Goal     OT, PT/OT Ongoing, Progressing    Description: Goals to be met by: discharge    Patient will increase functional independence with ADLs by performing:    UE Dressing with Minimal Assistance.  LE Dressing with Minimal Assistance.  Toileting from toilet with Minimal Assistance for hygiene and clothing management.   Sitting at edge of bed x10 minutes with Minimal Assistance.  Supine to sit with Minimal Assistance.                     Time Tracking:     OT Date of Treatment: 06/11/22  OT Start Time: 1500  OT Stop Time: 1555  OT Total Time (min): 55 min    Billable Minutes:Therapeutic Activity 55    OT/CARI: OT     CARI Visit Number: 1    6/11/2022

## 2022-06-12 LAB
ALBUMIN SERPL BCP-MCNC: 2.3 G/DL (ref 3.5–5.2)
ALP SERPL-CCNC: 96 U/L (ref 55–135)
ALT SERPL W/O P-5'-P-CCNC: 16 U/L (ref 10–44)
ANION GAP SERPL CALC-SCNC: 5 MMOL/L (ref 8–16)
AST SERPL-CCNC: 20 U/L (ref 10–40)
BASOPHILS # BLD AUTO: 0.11 K/UL (ref 0–0.2)
BASOPHILS NFR BLD: 1.4 % (ref 0–1.9)
BILIRUB SERPL-MCNC: 0.7 MG/DL (ref 0.1–1)
BUN SERPL-MCNC: 17 MG/DL (ref 8–23)
CALCIUM SERPL-MCNC: 8.4 MG/DL (ref 8.7–10.5)
CHLORIDE SERPL-SCNC: 106 MMOL/L (ref 95–110)
CO2 SERPL-SCNC: 31 MMOL/L (ref 23–29)
CREAT SERPL-MCNC: 1 MG/DL (ref 0.5–1.4)
DIFFERENTIAL METHOD: ABNORMAL
EOSINOPHIL # BLD AUTO: 0.3 K/UL (ref 0–0.5)
EOSINOPHIL NFR BLD: 4.3 % (ref 0–8)
ERYTHROCYTE [DISTWIDTH] IN BLOOD BY AUTOMATED COUNT: 15.2 % (ref 11.5–14.5)
EST. GFR  (AFRICAN AMERICAN): >60 ML/MIN/1.73 M^2
EST. GFR  (NON AFRICAN AMERICAN): 57.6 ML/MIN/1.73 M^2
GLUCOSE SERPL-MCNC: 98 MG/DL (ref 70–110)
HCT VFR BLD AUTO: 33.9 % (ref 37–48.5)
HGB BLD-MCNC: 10.8 G/DL (ref 12–16)
IMM GRANULOCYTES # BLD AUTO: 0.08 K/UL (ref 0–0.04)
IMM GRANULOCYTES NFR BLD AUTO: 1 % (ref 0–0.5)
LYMPHOCYTES # BLD AUTO: 2.2 K/UL (ref 1–4.8)
LYMPHOCYTES NFR BLD: 27.2 % (ref 18–48)
MAGNESIUM SERPL-MCNC: 2 MG/DL (ref 1.6–2.6)
MCH RBC QN AUTO: 31 PG (ref 27–31)
MCHC RBC AUTO-ENTMCNC: 31.9 G/DL (ref 32–36)
MCV RBC AUTO: 97 FL (ref 82–98)
MONOCYTES # BLD AUTO: 0.8 K/UL (ref 0.3–1)
MONOCYTES NFR BLD: 10.5 % (ref 4–15)
NEUTROPHILS # BLD AUTO: 4.4 K/UL (ref 1.8–7.7)
NEUTROPHILS NFR BLD: 55.6 % (ref 38–73)
NRBC BLD-RTO: 0 /100 WBC
PLATELET # BLD AUTO: 338 K/UL (ref 150–450)
PMV BLD AUTO: 10.2 FL (ref 9.2–12.9)
POTASSIUM SERPL-SCNC: 3.9 MMOL/L (ref 3.5–5.1)
PROT SERPL-MCNC: 6.9 G/DL (ref 6–8.4)
RBC # BLD AUTO: 3.48 M/UL (ref 4–5.4)
SODIUM SERPL-SCNC: 142 MMOL/L (ref 136–145)
WBC # BLD AUTO: 7.93 K/UL (ref 3.9–12.7)

## 2022-06-12 PROCEDURE — 99900031 HC PATIENT EDUCATION (STAT)

## 2022-06-12 PROCEDURE — 25000003 PHARM REV CODE 250: Performed by: NURSE PRACTITIONER

## 2022-06-12 PROCEDURE — 99900035 HC TECH TIME PER 15 MIN (STAT)

## 2022-06-12 PROCEDURE — 36415 COLL VENOUS BLD VENIPUNCTURE: CPT | Performed by: INTERNAL MEDICINE

## 2022-06-12 PROCEDURE — 63600175 PHARM REV CODE 636 W HCPCS: Performed by: NURSE PRACTITIONER

## 2022-06-12 PROCEDURE — 25000242 PHARM REV CODE 250 ALT 637 W/ HCPCS: Performed by: NURSE PRACTITIONER

## 2022-06-12 PROCEDURE — 63700000 PHARM REV CODE 250 ALT 637 W/O HCPCS: Performed by: INTERNAL MEDICINE

## 2022-06-12 PROCEDURE — 25000003 PHARM REV CODE 250: Performed by: INTERNAL MEDICINE

## 2022-06-12 PROCEDURE — 94640 AIRWAY INHALATION TREATMENT: CPT

## 2022-06-12 PROCEDURE — 80053 COMPREHEN METABOLIC PANEL: CPT | Performed by: INTERNAL MEDICINE

## 2022-06-12 PROCEDURE — 85025 COMPLETE CBC W/AUTO DIFF WBC: CPT | Performed by: INTERNAL MEDICINE

## 2022-06-12 PROCEDURE — 25000003 PHARM REV CODE 250: Performed by: FAMILY MEDICINE

## 2022-06-12 PROCEDURE — 94761 N-INVAS EAR/PLS OXIMETRY MLT: CPT

## 2022-06-12 PROCEDURE — 83735 ASSAY OF MAGNESIUM: CPT | Performed by: INTERNAL MEDICINE

## 2022-06-12 PROCEDURE — 63600175 PHARM REV CODE 636 W HCPCS: Performed by: INTERNAL MEDICINE

## 2022-06-12 PROCEDURE — 11000001 HC ACUTE MED/SURG PRIVATE ROOM

## 2022-06-12 RX ADMIN — APIXABAN 5 MG: 5 TABLET, FILM COATED ORAL at 08:06

## 2022-06-12 RX ADMIN — LEVOTHYROXINE SODIUM 150 MCG: 150 TABLET ORAL at 05:06

## 2022-06-12 RX ADMIN — ALPRAZOLAM 0.5 MG: 0.5 TABLET ORAL at 08:06

## 2022-06-12 RX ADMIN — ALPRAZOLAM 0.5 MG: 0.5 TABLET ORAL at 09:06

## 2022-06-12 RX ADMIN — GABAPENTIN 300 MG: 300 CAPSULE ORAL at 09:06

## 2022-06-12 RX ADMIN — IPRATROPIUM BROMIDE AND ALBUTEROL SULFATE 3 ML: 2.5; .5 SOLUTION RESPIRATORY (INHALATION) at 01:06

## 2022-06-12 RX ADMIN — AMPICILLIN 2 G: 2 INJECTION, POWDER, FOR SOLUTION INTRAMUSCULAR; INTRAVENOUS at 05:06

## 2022-06-12 RX ADMIN — APIXABAN 5 MG: 5 TABLET, FILM COATED ORAL at 09:06

## 2022-06-12 RX ADMIN — GUAIFENESIN 1200 MG: 600 TABLET, EXTENDED RELEASE ORAL at 09:06

## 2022-06-12 RX ADMIN — IPRATROPIUM BROMIDE AND ALBUTEROL SULFATE 3 ML: 2.5; .5 SOLUTION RESPIRATORY (INHALATION) at 08:06

## 2022-06-12 RX ADMIN — METOPROLOL TARTRATE 50 MG: 50 TABLET, FILM COATED ORAL at 09:06

## 2022-06-12 RX ADMIN — PANTOPRAZOLE SODIUM 40 MG: 40 TABLET, DELAYED RELEASE ORAL at 08:06

## 2022-06-12 RX ADMIN — FLUCONAZOLE 200 MG: 100 TABLET ORAL at 08:06

## 2022-06-12 RX ADMIN — AMPICILLIN 2 G: 2 INJECTION, POWDER, FOR SOLUTION INTRAMUSCULAR; INTRAVENOUS at 08:06

## 2022-06-12 RX ADMIN — AMIODARONE HYDROCHLORIDE 100 MG: 100 TABLET ORAL at 08:06

## 2022-06-12 RX ADMIN — HYDROMORPHONE HYDROCHLORIDE 2 MG: 2 INJECTION, SOLUTION INTRAMUSCULAR; INTRAVENOUS; SUBCUTANEOUS at 08:06

## 2022-06-12 RX ADMIN — METOPROLOL TARTRATE 50 MG: 50 TABLET, FILM COATED ORAL at 08:06

## 2022-06-12 RX ADMIN — GUAIFENESIN 1200 MG: 600 TABLET, EXTENDED RELEASE ORAL at 08:06

## 2022-06-12 RX ADMIN — HYDROMORPHONE HYDROCHLORIDE 2 MG: 2 INJECTION, SOLUTION INTRAMUSCULAR; INTRAVENOUS; SUBCUTANEOUS at 09:06

## 2022-06-12 RX ADMIN — IPRATROPIUM BROMIDE AND ALBUTEROL SULFATE 3 ML: 2.5; .5 SOLUTION RESPIRATORY (INHALATION) at 07:06

## 2022-06-12 RX ADMIN — AMPICILLIN 2 G: 2 INJECTION, POWDER, FOR SOLUTION INTRAMUSCULAR; INTRAVENOUS at 01:06

## 2022-06-12 RX ADMIN — AMPICILLIN 2 G: 2 INJECTION, POWDER, FOR SOLUTION INTRAMUSCULAR; INTRAVENOUS at 09:06

## 2022-06-12 RX ADMIN — PANTOPRAZOLE SODIUM 40 MG: 40 TABLET, DELAYED RELEASE ORAL at 09:06

## 2022-06-12 RX ADMIN — AMPICILLIN 2 G: 2 INJECTION, POWDER, FOR SOLUTION INTRAMUSCULAR; INTRAVENOUS at 02:06

## 2022-06-12 RX ADMIN — SPIRONOLACTONE 12.5 MG: 25 TABLET, FILM COATED ORAL at 08:06

## 2022-06-12 NOTE — SUBJECTIVE & OBJECTIVE
Interval History: Seen and examined.     Review of Systems   Constitutional:  Positive for activity change. Negative for fatigue and fever.   HENT:  Negative for sinus pressure and sinus pain.    Respiratory:  Positive for cough (intermittent, nonproductive) and wheezing. Negative for chest tightness and shortness of breath.    Cardiovascular:  Negative for chest pain, palpitations and leg swelling.   Gastrointestinal:  Negative for abdominal pain, blood in stool, constipation, diarrhea, nausea and vomiting.   Musculoskeletal:  Positive for arthralgias, back pain and gait problem.   Skin:  Negative for rash and wound.   Allergic/Immunologic: Negative for environmental allergies.   Neurological:  Positive for weakness. Negative for seizures and syncope.   Objective:     Vital Signs (Most Recent):  Temp: 98.1 °F (36.7 °C) (06/1955)  Pulse: 73 (06/1955)  Resp: 20 ([birth year]/1953)  BP: (!) 128/58 ([birth year]/1953)  SpO2: (!) 94 % (06/1955)   Vital Signs (24h Range):  Temp:  [97.8 °F (36.6 °C)-98.5 °F (36.9 °C)] 98.1 °F (36.7 °C)  Pulse:  [] 73  Resp:  [16-20] 20  SpO2:  [90 %-96 %] 94 %  BP: (107-129)/(57-74) 128/58     Weight: 136.1 kg (300 lb)  Body mass index is 51.49 kg/m².    Intake/Output Summary (Last 24 hours) at 6/11/2022 2052  Last data filed at 6/11/2022 1804  Gross per 24 hour   Intake 1800 ml   Output 1850 ml   Net -50 ml      Physical Exam  Constitutional:       Appearance: She is obese.   HENT:      Head: Normocephalic.      Nose: Nose normal.      Mouth/Throat:      Mouth: Mucous membranes are moist.   Eyes:      Extraocular Movements: Extraocular movements intact.      Pupils: Pupils are equal, round, and reactive to light.   Cardiovascular:      Rate and Rhythm: Normal rate and regular rhythm.   Pulmonary:      Effort: Pulmonary effort is normal.      Breath sounds: No wheezing (distant).      Comments: Breath sounds reaching lung bases.   Abdominal:      General: Bowel sounds are  normal. There is no distension.      Palpations: Abdomen is soft.   Musculoskeletal:      Cervical back: Normal range of motion.   Skin:     General: Skin is warm and dry.      Findings: Rash present. No lesion.   Neurological:      Mental Status: She is alert and oriented to person, place, and time.   Psychiatric:         Mood and Affect: Mood normal.         Behavior: Behavior normal.         Thought Content: Thought content normal.         Judgment: Judgment normal.     BMP:   Recent Labs   Lab 06/11/22  0455   GLU 86      K 4.2      CO2 29   BUN 17   CREATININE 0.9   CALCIUM 8.6*   MG 2.1     CBC:   Recent Labs   Lab 06/10/22  0541 06/11/22 0455   WBC 8.49 8.32   HGB 10.6* 11.0*   HCT 32.9* 34.3*    331     CMP:   Recent Labs   Lab 06/10/22  0541 06/11/22  0455    141   K 4.2 4.2    105   CO2 28 29   GLU 99 86   BUN 19 17   CREATININE 1.1 0.9   CALCIUM 8.4* 8.6*   PROT 6.8 7.2   ALBUMIN 2.1* 2.3*   BILITOT 0.5 0.8   ALKPHOS 95 101   AST 18 20   ALT 16 17   ANIONGAP 6* 7*   EGFRNONAA 51.3* >60.0

## 2022-06-12 NOTE — ASSESSMENT & PLAN NOTE
This patient does have evidence of infective focus  My overall impression is sepsis. Vital signs were reviewed and noted in progress note.  Antibiotics given-   Antibiotics (From admission, onward)            Start     Stop Route Frequency Ordered    06/07/22 0945  ampicillin 2 g in sodium chloride 0.9 % 100 mL IVPB (ready to mix system)         -- IV Every 4 hours (non-standard times) 06/07/22 0838        Cultures were taken-   Microbiology Results (last 7 days)     Procedure Component Value Units Date/Time    Blood culture [[id number]] Collected: 06/04/22 1255    Order Status: Completed Specimen: Blood Updated: 06/09/22 2212     Blood Culture, Routine No growth after 5 days.    Blood culture [[id number]] Collected: 06/04/22 1301    Order Status: Completed Specimen: Blood Updated: 06/09/22 2212     Blood Culture, Routine No growth after 5 days.        Latest lactate reviewed, they are-  No results for input(s): LACTATE in the last 72 hours.    Organ dysfunction indicated by Acute respiratory failure  Source- uti, bacteremia    Source control Achieved by- iv abxs  6/3 cont iv abxs- follow sensitivites  6/4:  Awaiting sensitivities.  Draw surveillance cultures.   6/5:  Sensitive to vancomycin.  Surveillance cultures negative thus far.  6/6: Continue abx therapy with Rocephin and Vancomycin (day 5). Monitoring repeat blood cultures  6/7: Downgrade abx therpy to Ampicillin - IV abx therapy day 6  6/10: IV abx therapy day 9 with Ampicillin  6/11: Ampicillin D10, patient has been afebrile, second set of cultures final report negative for growth.    Likely move to swing bed tomorrow.

## 2022-06-12 NOTE — ASSESSMENT & PLAN NOTE
Patient with Persistent (7 days or more) atrial fibrillation which is controlled currently with Beta Blocker and Amiodarone. Patient is currently in atrial fibrillation.[id number] Score: 2. Anticoagulation indicated. Anticoagulation done with eliquis.

## 2022-06-12 NOTE — ASSESSMENT & PLAN NOTE
This patient does have evidence of infective focus  My overall impression is sepsis. Vital signs were reviewed and noted in progress note.  Antibiotics given-   Antibiotics (From admission, onward)            Start     Stop Route Frequency Ordered    06/07/22 0945  ampicillin 2 g in sodium chloride 0.9 % 100 mL IVPB (ready to mix system)         -- IV Every 4 hours (non-standard times) 06/07/22 0838        Cultures were taken-   Microbiology Results (last 7 days)     Procedure Component Value Units Date/Time    Blood culture [[id number]] Collected: 06/04/22 1255    Order Status: Completed Specimen: Blood Updated: 06/09/22 2212     Blood Culture, Routine No growth after 5 days.    Blood culture [[id number]] Collected: 06/04/22 1301    Order Status: Completed Specimen: Blood Updated: 06/09/22 2212     Blood Culture, Routine No growth after 5 days.        Latest lactate reviewed, they are-  No results for input(s): LACTATE in the last 72 hours.    Organ dysfunction indicated by Acute respiratory failure  Source- uti, bacteremia    Source control Achieved by- iv abxs  6/3 cont iv abxs- follow sensitivites  6/4:  Awaiting sensitivities.  Draw surveillance cultures.   6/5:  Sensitive to vancomycin.  Surveillance cultures negative thus far.  6/6: Continue abx therapy with Rocephin and Vancomycin (day 5). Monitoring repeat blood cultures  6/7: Downgrade abx therpy to Ampicillin - IV abx therapy day 6  6/10: IV abx therapy day 9 with Ampicillin  6/11: Ampicillin D10, patient has been afebrile, second set of cultures final report negative for growth.

## 2022-06-12 NOTE — ASSESSMENT & PLAN NOTE
Pt with severe back pain - CT of back shows no fractures but severe degenerative dz  Continue therapy efforts  Continue pain control, IV pain meds x2 per day now

## 2022-06-12 NOTE — PLAN OF CARE
06/12/22 1235   Medicare Message   Important Message from Medicare regarding Discharge Appeal Rights Given to patient/caregiver;Explained to patient/caregiver;Signed/date by patient/caregiver   Date IMM was signed 06/12/22   Time IMM was signed 1201

## 2022-06-12 NOTE — SUBJECTIVE & OBJECTIVE
Interval History: Seen and examined.     Review of Systems   Constitutional:  Positive for activity change. Negative for fatigue and fever.   HENT:  Negative for sinus pressure and sinus pain.    Respiratory:  Positive for cough (intermittent, nonproductive) and wheezing. Negative for chest tightness and shortness of breath.    Cardiovascular:  Negative for chest pain, palpitations and leg swelling.   Gastrointestinal:  Negative for abdominal pain, blood in stool, constipation, diarrhea, nausea and vomiting.   Musculoskeletal:  Positive for arthralgias, back pain and gait problem.   Skin:  Negative for rash and wound.   Allergic/Immunologic: Negative for environmental allergies.   Neurological:  Positive for weakness. Negative for seizures and syncope.   Objective:     Vital Signs (Most Recent):  Temp: 97.6 °F (36.4 °C) (06/12/22 0805)  Pulse: 83 (06/12/22 0805)  Resp: 18 (06/12/22 0827)  BP: (!) 141/67 (06/12/22 0805)  SpO2: (!) 92 % (06/12/22 0805)   Vital Signs (24h Range):  Temp:  [97.6 °F (36.4 °C)-98.1 °F (36.7 °C)] 97.6 °F (36.4 °C)  Pulse:  [] 83  Resp:  [16-20] 18  SpO2:  [92 %-96 %] 92 %  BP: (119-141)/(58-85) 141/67     Weight: 136.1 kg (300 lb)  Body mass index is 51.49 kg/m².    Intake/Output Summary (Last 24 hours) at 6/12/2022 1039  Last data filed at 6/11/2022 1804  Gross per 24 hour   Intake 1560 ml   Output 1200 ml   Net 360 ml        Physical Exam  Constitutional:       Appearance: She is obese.   HENT:      Head: Normocephalic.      Nose: Nose normal.      Mouth/Throat:      Mouth: Mucous membranes are moist.   Eyes:      Extraocular Movements: Extraocular movements intact.      Pupils: Pupils are equal, round, and reactive to light.   Cardiovascular:      Rate and Rhythm: Normal rate and regular rhythm.   Pulmonary:      Effort: Pulmonary effort is normal.      Breath sounds: No wheezing (distant).      Comments: Breath sounds reaching lung bases.   Abdominal:      General: Bowel sounds  are normal. There is no distension.      Palpations: Abdomen is soft.   Musculoskeletal:      Cervical back: Normal range of motion.   Skin:     General: Skin is warm and dry.      Findings: No lesion or rash.   Neurological:      Mental Status: She is alert and oriented to person, place, and time.   Psychiatric:         Mood and Affect: Mood normal.         Behavior: Behavior normal.         Thought Content: Thought content normal.         Judgment: Judgment normal.     BMP:   Recent Labs   Lab 06/12/22 0528   GLU 98      K 3.9      CO2 31*   BUN 17   CREATININE 1.0   CALCIUM 8.4*   MG 2.0       CBC:   Recent Labs   Lab 06/11/22 0455 06/12/22 0528   WBC 8.32 7.93   HGB 11.0* 10.8*   HCT 34.3* 33.9*    338       CMP:   Recent Labs   Lab 06/11/22 0455 06/12/22  0528    142   K 4.2 3.9    106   CO2 29 31*   GLU 86 98   BUN 17 17   CREATININE 0.9 1.0   CALCIUM 8.6* 8.4*   PROT 7.2 6.9   ALBUMIN 2.3* 2.3*   BILITOT 0.8 0.7   ALKPHOS 101 96   AST 20 20   ALT 17 16   ANIONGAP 7* 5*   EGFRNONAA >60.0 57.6*

## 2022-06-12 NOTE — ASSESSMENT & PLAN NOTE
Patient with Persistent (7 days or more) atrial fibrillation which is controlled currently with Beta Blocker and Amiodarone. Patient is currently in atrial fibrillation.[id number] Score: 2. Anticoagulation indicated. Anticoagulation done with eliquis.

## 2022-06-12 NOTE — PROGRESS NOTES
[facility]  Progress Note    Patient Name: Aurelia Ruggiero  MRN: [id number]  Patient Class: IP- Inpatient   Admission Date: 6/1/2022  Length of Stay: 10 days  Attending Physician: Marimar Kellogg MD  Primary Care Provider: Marimar Kellogg MD    Subjective:     Principal Problem:Sepsis    HPI:  67-year-old female presents to the ED via EMS with reports of worsening lower back pain.  Patient denies any falls or trauma reports that pain starts and left lower back and radiates down to left leg.  Patient reports she has a history of arthritis but denies any surgeries or procedures on back.  EMS reports patient was noted to be O2 sat on room air of 88%.  Patient has a history of AFib but reports not taking medications this morning.  Patient's PCP is Dr. Kellogg     Overview/Hospital Course:  6/3 ND pt more peppy this am - still havign lower back and side pain.   Informed pt and son of her sepsis dx and need for iv abxs.  Breathing is better per pt.  6/4 WC:  Tolerating treatment well thus far.  Still very fatigued.   6/5 WC:  Surveillance cultures negative thus far.  Patient remains fatigued with diffuse pain.  6/6 SD: Back pain improved, moving more with less pain - continue therapy efforts. Repeat blood cultures in process. Continue abx therapy (day 5).  6/7 SD: Downgrade abx therapy to ampicillin. Rash to back - pt feels related to laundry detergent - Benadryl PRN.  6/8 SD: Continue abx therapy. Rash improved. Continue therapy efforts.  6/9 SD: Continue abx therapy. Improved effort with OT yesterday. Continue PT/OT.  6/10 SD: C/O cough and wheezing. Consult for IPR. Continue abx therapy.  6/11 KGY: complete D7 IV abx. Continue PT/OT. Awaiting approval for continued IP rehab with swing bed to regain strength    Interval History: Seen and examined.     Review of Systems   Constitutional:  Positive for activity change. Negative for fatigue and fever.   HENT:  Negative for sinus pressure and  sinus pain.    Respiratory:  Positive for cough (intermittent, nonproductive) and wheezing. Negative for chest tightness and shortness of breath.    Cardiovascular:  Negative for chest pain, palpitations and leg swelling.   Gastrointestinal:  Negative for abdominal pain, blood in stool, constipation, diarrhea, nausea and vomiting.   Musculoskeletal:  Positive for arthralgias, back pain and gait problem.   Skin:  Negative for rash and wound.   Allergic/Immunologic: Negative for environmental allergies.   Neurological:  Positive for weakness. Negative for seizures and syncope.   Objective:     Vital Signs (Most Recent):  Temp: 98.1 °F (36.7 °C) (06/1955)  Pulse: 73 (06/1955)  Resp: 20 (06/1955)  BP: (!) 128/58 (06/1955)  SpO2: (!) 94 % (06/1955)   Vital Signs (24h Range):  Temp:  [97.8 °F (36.6 °C)-98.5 °F (36.9 °C)] 98.1 °F (36.7 °C)  Pulse:  [] 73  Resp:  [16-20] 20  SpO2:  [90 %-96 %] 94 %  BP: (107-129)/(57-74) 128/58     Weight: 136.1 kg (300 lb)  Body mass index is 51.49 kg/m².    Intake/Output Summary (Last 24 hours) at 6/11/2022 2052  Last data filed at 6/11/2022 1804  Gross per 24 hour   Intake 1800 ml   Output 1850 ml   Net -50 ml      Physical Exam  Constitutional:       Appearance: She is obese.   HENT:      Head: Normocephalic.      Nose: Nose normal.      Mouth/Throat:      Mouth: Mucous membranes are moist.   Eyes:      Extraocular Movements: Extraocular movements intact.      Pupils: Pupils are equal, round, and reactive to light.   Cardiovascular:      Rate and Rhythm: Normal rate and regular rhythm.   Pulmonary:      Effort: Pulmonary effort is normal.      Breath sounds: No wheezing (distant).      Comments: Breath sounds reaching lung bases.   Abdominal:      General: Bowel sounds are normal. There is no distension.      Palpations: Abdomen is soft.   Musculoskeletal:      Cervical back: Normal range of motion.   Skin:     General: Skin is warm and dry.       Findings: Rash present. No lesion.   Neurological:      Mental Status: She is alert and oriented to person, place, and time.   Psychiatric:         Mood and Affect: Mood normal.         Behavior: Behavior normal.         Thought Content: Thought content normal.         Judgment: Judgment normal.     BMP:   Recent Labs   Lab 06/11/22  0455   GLU 86      K 4.2      CO2 29   BUN 17   CREATININE 0.9   CALCIUM 8.6*   MG 2.1     CBC:   Recent Labs   Lab 06/10/22  0541 06/11/22  0455   WBC 8.49 8.32   HGB 10.6* 11.0*   HCT 32.9* 34.3*    331     CMP:   Recent Labs   Lab 06/10/22  0541 06/11/22  0455    141   K 4.2 4.2    105   CO2 28 29   GLU 99 86   BUN 19 17   CREATININE 1.1 0.9   CALCIUM 8.4* 8.6*   PROT 6.8 7.2   ALBUMIN 2.1* 2.3*   BILITOT 0.5 0.8   ALKPHOS 95 101   AST 18 20   ALT 16 17   ANIONGAP 6* 7*   EGFRNONAA 51.3* >60.0       Assessment/Plan:      * Sepsis  This patient does have evidence of infective focus  My overall impression is sepsis. Vital signs were reviewed and noted in progress note.  Antibiotics given-   Antibiotics (From admission, onward)            Start     Stop Route Frequency Ordered    06/07/22 0945  ampicillin 2 g in sodium chloride 0.9 % 100 mL IVPB (ready to mix system)         -- IV Every 4 hours (non-standard times) 06/07/22 0838        Cultures were taken-   Microbiology Results (last 7 days)     Procedure Component Value Units Date/Time    Blood culture [[id number]] Collected: 06/04/22 1255    Order Status: Completed Specimen: Blood Updated: 06/09/22 2212     Blood Culture, Routine No growth after 5 days.    Blood culture [[id number]] Collected: 06/04/22 1301    Order Status: Completed Specimen: Blood Updated: 06/09/22 2212     Blood Culture, Routine No growth after 5 days.        Latest lactate reviewed, they are-  No results for input(s): LACTATE in the last 72 hours.    Organ dysfunction indicated by Acute respiratory failure  Source- uti,  [de-identified] : General: Not in acute distress, dressed appropriately, sitting on examination table\par Skin: Warm and dry, normal turgor, no rashes\par Neurological: AOx3, Cranial nerves grossly in tact\par Psych: Mood and affect appropriate\par \par Right Hip: No swelling edema erythema redness or drainage. Non-tender. ROM: Hip flexion 120, abduction 30, int/ext rotation 45. Limited internal rotation- less than 5 degrees. Painless range of motion. 5/5 strength. Normal gait. \par \par Left Hip: No swelling edema erythema redness or drainage. Non-tender. ROM: Hip flexion 120, abduction 30, int/ext rotation 45. Limited internal rotation- less than 5 degrees. Painless range of motion. 5/5 strength. Normal gait. \par \par  [de-identified] : X-rays from 12/19 shows bilateral mild to moderate hip OA with joint space narrowing with osteophyte formation and subchondral sclerosis bacteremia    Source control Achieved by- iv abxs  6/3 cont iv abxs- follow sensitivites  6/4:  Awaiting sensitivities.  Draw surveillance cultures.   6/5:  Sensitive to vancomycin.  Surveillance cultures negative thus far.  6/6: Continue abx therapy with Rocephin and Vancomycin (day 5). Monitoring repeat blood cultures  6/7: Downgrade abx therpy to Ampicillin - IV abx therapy day 6  6/10: IV abx therapy day 9 with Ampicillin  6/11: Ampicillin D10, patient has been afebrile, second set of cultures final report negative for growth.    Rash  Resolved. Pt suspects R/T laundry detergent. Bendaryl PRN.      Myopathy  Continue therapy efforts.  Encourage daily adequate hydration.       Hypokalemia  Improved. Monitor daily labs and adjust repletion as needed.    Recent Labs   Lab 06/10/22  0541 06/11/22  0455   K 4.2 4.2         Abdominal pain  CT of abd/pelvis - was negative  Resolved      Longstanding persistent atrial fibrillation  Patient with Persistent (7 days or more) atrial fibrillation which is controlled currently with Beta Blocker and Amiodarone. Patient is currently in atrial fibrillation.[id number] Score: 2. Anticoagulation indicated. Anticoagulation done with eliquis.      Lumbar disc disease with radiculopathy  Pt with severe back pain - CT of back shows no fractures but severe degenerative dz  Continue therapy efforts  Continue pain control, IV pain meds x2 per day now       Acute pulmonary edema  Resolved        VTE Risk Mitigation (From admission, onward)         Ordered     apixaban tablet 5 mg  2 times daily         06/02/22 1742     IP VTE HIGH RISK PATIENT  Once         06/01/22 1440     Place sequential compression device  Until discontinued         06/01/22 1440                Discharge Planning   PARISH:      Code Status: Full Code   Is the patient medically ready for discharge?:     Reason for patient still in hospital (select all that apply): Pending disposition  Discharge Plan A: Home, Home Health         Wai Villegas DO  Department of Hospital Medicine   [facility]

## 2022-06-12 NOTE — PROGRESS NOTES
[facility]  Progress Note    Patient Name: Aurelia Ruggiero  MRN: [id number]  Patient Class: IP- Inpatient   Admission Date: 6/1/2022  Length of Stay: 11 days  Attending Physician: Marimar Kellogg MD  Primary Care Provider: Marimar Kellogg MD        Subjective:     Principal Problem:Sepsis        HPI:  67-year-old female presents to the ED via EMS with reports of worsening lower back pain.  Patient denies any falls or trauma reports that pain starts and left lower back and radiates down to left leg.  Patient reports she has a history of arthritis but denies any surgeries or procedures on back.  EMS reports patient was noted to be O2 sat on room air of 88%.  Patient has a history of AFib but reports not taking medications this morning.  Patient's PCP is Dr. Kellogg       Overview/Hospital Course:  6/3 ND pt more peppy this am - still havign lower back and side pain.   Informed pt and son of her sepsis dx and need for iv abxs.  Breathing is better per pt.  6/4 WC:  Tolerating treatment well thus far.  Still very fatigued.   6/5 WC:  Surveillance cultures negative thus far.  Patient remains fatigued with diffuse pain.  6/6 SD: Back pain improved, moving more with less pain - continue therapy efforts. Repeat blood cultures in process. Continue abx therapy (day 5).  6/7 SD: Downgrade abx therapy to ampicillin. Rash to back - pt feels related to laundry detergent - Benadryl PRN.  6/8 SD: Continue abx therapy. Rash improved. Continue therapy efforts.  6/9 SD: Continue abx therapy. Improved effort with OT yesterday. Continue PT/OT.  6/10 SD: C/O cough and wheezing. Consult for IPR. Continue abx therapy.  6/11 KGY: complete D7 IV abx. Continue PT/OT. Awaiting approval for continued IP rehab with swing bed to regain strength  6/12/2022 FM:  Patient continues to feel better and work with therapy.  We are waiting approval for a swing bed.  No other new changes today.      Interval History: Seen and  examined.     Review of Systems   Constitutional:  Positive for activity change. Negative for fatigue and fever.   HENT:  Negative for sinus pressure and sinus pain.    Respiratory:  Positive for cough (intermittent, nonproductive) and wheezing. Negative for chest tightness and shortness of breath.    Cardiovascular:  Negative for chest pain, palpitations and leg swelling.   Gastrointestinal:  Negative for abdominal pain, blood in stool, constipation, diarrhea, nausea and vomiting.   Musculoskeletal:  Positive for arthralgias, back pain and gait problem.   Skin:  Negative for rash and wound.   Allergic/Immunologic: Negative for environmental allergies.   Neurological:  Positive for weakness. Negative for seizures and syncope.   Objective:     Vital Signs (Most Recent):  Temp: 97.6 °F (36.4 °C) (06/12/22 0805)  Pulse: 83 (06/12/22 0805)  Resp: 18 (06/12/22 0827)  BP: (!) 141/67 (06/12/22 0805)  SpO2: (!) 92 % (06/12/22 0805)   Vital Signs (24h Range):  Temp:  [97.6 °F (36.4 °C)-98.1 °F (36.7 °C)] 97.6 °F (36.4 °C)  Pulse:  [] 83  Resp:  [16-20] 18  SpO2:  [92 %-96 %] 92 %  BP: (119-141)/(58-85) 141/67     Weight: 136.1 kg (300 lb)  Body mass index is 51.49 kg/m².    Intake/Output Summary (Last 24 hours) at 6/12/2022 1039  Last data filed at 6/11/2022 1804  Gross per 24 hour   Intake 1560 ml   Output 1200 ml   Net 360 ml        Physical Exam  Constitutional:       Appearance: She is obese.   HENT:      Head: Normocephalic.      Nose: Nose normal.      Mouth/Throat:      Mouth: Mucous membranes are moist.   Eyes:      Extraocular Movements: Extraocular movements intact.      Pupils: Pupils are equal, round, and reactive to light.   Cardiovascular:      Rate and Rhythm: Normal rate and regular rhythm.   Pulmonary:      Effort: Pulmonary effort is normal.      Breath sounds: No wheezing (distant).      Comments: Breath sounds reaching lung bases.   Abdominal:      General: Bowel sounds are normal. There is no  distension.      Palpations: Abdomen is soft.   Musculoskeletal:      Cervical back: Normal range of motion.   Skin:     General: Skin is warm and dry.      Findings: No lesion or rash.   Neurological:      Mental Status: She is alert and oriented to person, place, and time.   Psychiatric:         Mood and Affect: Mood normal.         Behavior: Behavior normal.         Thought Content: Thought content normal.         Judgment: Judgment normal.     BMP:   Recent Labs   Lab 06/12/22  0528   GLU 98      K 3.9      CO2 31*   BUN 17   CREATININE 1.0   CALCIUM 8.4*   MG 2.0       CBC:   Recent Labs   Lab 06/11/22 0455 06/12/22 0528   WBC 8.32 7.93   HGB 11.0* 10.8*   HCT 34.3* 33.9*    338       CMP:   Recent Labs   Lab 06/11/22 0455 06/12/22 0528    142   K 4.2 3.9    106   CO2 29 31*   GLU 86 98   BUN 17 17   CREATININE 0.9 1.0   CALCIUM 8.6* 8.4*   PROT 7.2 6.9   ALBUMIN 2.3* 2.3*   BILITOT 0.8 0.7   ALKPHOS 101 96   AST 20 20   ALT 17 16   ANIONGAP 7* 5*   EGFRNONAA >60.0 57.6*           Assessment/Plan:      * Sepsis  This patient does have evidence of infective focus  My overall impression is sepsis. Vital signs were reviewed and noted in progress note.  Antibiotics given-   Antibiotics (From admission, onward)            Start     Stop Route Frequency Ordered    06/07/22 0945  ampicillin 2 g in sodium chloride 0.9 % 100 mL IVPB (ready to mix system)         -- IV Every 4 hours (non-standard times) 06/07/22 0838        Cultures were taken-   Microbiology Results (last 7 days)     Procedure Component Value Units Date/Time    Blood culture [[id number]] Collected: 06/04/22 1255    Order Status: Completed Specimen: Blood Updated: 06/09/22 2212     Blood Culture, Routine No growth after 5 days.    Blood culture [[id number]] Collected: 06/04/22 1301    Order Status: Completed Specimen: Blood Updated: 06/09/22 2212     Blood Culture, Routine No growth after 5 days.        Latest lactate  reviewed, they are-  No results for input(s): LACTATE in the last 72 hours.    Organ dysfunction indicated by Acute respiratory failure  Source- uti, bacteremia    Source control Achieved by- iv abxs  6/3 cont iv abxs- follow sensitivites  6/4:  Awaiting sensitivities.  Draw surveillance cultures.   6/5:  Sensitive to vancomycin.  Surveillance cultures negative thus far.  6/6: Continue abx therapy with Rocephin and Vancomycin (day 5). Monitoring repeat blood cultures  6/7: Downgrade abx therpy to Ampicillin - IV abx therapy day 6  6/10: IV abx therapy day 9 with Ampicillin  6/11: Ampicillin D10, patient has been afebrile, second set of cultures final report negative for growth.    Likely move to swing bed tomorrow.    Rash  Resolved. Pt suspects R/T laundry detergent. Bendaryl PRN.      Myopathy  Continue therapy efforts.  Encourage daily adequate hydration.       Hypokalemia  Improved. Monitor daily labs and adjust repletion as needed.    Recent Labs   Lab 06/11/22  0455 06/12/22  0528   K 4.2 3.9         Abdominal pain  CT of abd/pelvis - was negative  Resolved      Longstanding persistent atrial fibrillation  Patient with Persistent (7 days or more) atrial fibrillation which is controlled currently with Beta Blocker and Amiodarone. Patient is currently in atrial fibrillation.[id number] Score: 2. Anticoagulation indicated. Anticoagulation done with eliquis.      Lumbar disc disease with radiculopathy  Pt with severe back pain - CT of back shows no fractures but severe degenerative dz  Continue therapy efforts  Continue pain control, IV pain meds x2 per day now       Acute pulmonary edema  Resolved        VTE Risk Mitigation (From admission, onward)         Ordered     apixaban tablet 5 mg  2 times daily         06/02/22 1742     IP VTE HIGH RISK PATIENT  Once         06/01/22 1440     Place sequential compression device  Until discontinued         06/01/22 1440                Discharge Planning   PARISH:      Code  Status: Full Code   Is the patient medically ready for discharge?:     Reason for patient still in hospital (select all that apply): Patient trending condition  Discharge Plan A: Home, Home Health                  Tutu Alvarado III, MD  Department of Hospital Medicine   [facility]

## 2022-06-12 NOTE — ASSESSMENT & PLAN NOTE
Improved. Monitor daily labs and adjust repletion as needed.    Recent Labs   Lab 06/10/22  0541 06/11/22  0455   K 4.2 4.2

## 2022-06-12 NOTE — ASSESSMENT & PLAN NOTE
Improved. Monitor daily labs and adjust repletion as needed.    Recent Labs   Lab 06/11/22  0455 06/12/22  0528   K 4.2 3.9

## 2022-06-13 LAB
ALBUMIN SERPL BCP-MCNC: 2.3 G/DL (ref 3.5–5.2)
ALP SERPL-CCNC: 100 U/L (ref 55–135)
ALT SERPL W/O P-5'-P-CCNC: 16 U/L (ref 10–44)
ANION GAP SERPL CALC-SCNC: 5 MMOL/L (ref 8–16)
AST SERPL-CCNC: 19 U/L (ref 10–40)
BASOPHILS # BLD AUTO: 0.1 K/UL (ref 0–0.2)
BASOPHILS NFR BLD: 1.2 % (ref 0–1.9)
BILIRUB SERPL-MCNC: 0.8 MG/DL (ref 0.1–1)
BUN SERPL-MCNC: 14 MG/DL (ref 8–23)
CALCIUM SERPL-MCNC: 8.7 MG/DL (ref 8.7–10.5)
CHLORIDE SERPL-SCNC: 106 MMOL/L (ref 95–110)
CO2 SERPL-SCNC: 31 MMOL/L (ref 23–29)
CREAT SERPL-MCNC: 0.9 MG/DL (ref 0.5–1.4)
DIFFERENTIAL METHOD: ABNORMAL
EOSINOPHIL # BLD AUTO: 0.3 K/UL (ref 0–0.5)
EOSINOPHIL NFR BLD: 4.1 % (ref 0–8)
ERYTHROCYTE [DISTWIDTH] IN BLOOD BY AUTOMATED COUNT: 15.2 % (ref 11.5–14.5)
EST. GFR  (AFRICAN AMERICAN): >60 ML/MIN/1.73 M^2
EST. GFR  (NON AFRICAN AMERICAN): >60 ML/MIN/1.73 M^2
GLUCOSE SERPL-MCNC: 83 MG/DL (ref 70–110)
HCT VFR BLD AUTO: 35.8 % (ref 37–48.5)
HGB BLD-MCNC: 11.5 G/DL (ref 12–16)
IMM GRANULOCYTES # BLD AUTO: 0.06 K/UL (ref 0–0.04)
IMM GRANULOCYTES NFR BLD AUTO: 0.7 % (ref 0–0.5)
LYMPHOCYTES # BLD AUTO: 2.3 K/UL (ref 1–4.8)
LYMPHOCYTES NFR BLD: 27.8 % (ref 18–48)
MAGNESIUM SERPL-MCNC: 2.2 MG/DL (ref 1.6–2.6)
MCH RBC QN AUTO: 31.2 PG (ref 27–31)
MCHC RBC AUTO-ENTMCNC: 32.1 G/DL (ref 32–36)
MCV RBC AUTO: 97 FL (ref 82–98)
MONOCYTES # BLD AUTO: 0.8 K/UL (ref 0.3–1)
MONOCYTES NFR BLD: 9.2 % (ref 4–15)
NEUTROPHILS # BLD AUTO: 4.6 K/UL (ref 1.8–7.7)
NEUTROPHILS NFR BLD: 57 % (ref 38–73)
NRBC BLD-RTO: 0 /100 WBC
PLATELET # BLD AUTO: 351 K/UL (ref 150–450)
PMV BLD AUTO: 10.3 FL (ref 9.2–12.9)
POTASSIUM SERPL-SCNC: 4.1 MMOL/L (ref 3.5–5.1)
PROT SERPL-MCNC: 7.2 G/DL (ref 6–8.4)
RBC # BLD AUTO: 3.69 M/UL (ref 4–5.4)
SARS-COV-2 RNA RESP QL NAA+PROBE: NOT DETECTED
SODIUM SERPL-SCNC: 142 MMOL/L (ref 136–145)
WBC # BLD AUTO: 8.12 K/UL (ref 3.9–12.7)

## 2022-06-13 PROCEDURE — 85025 COMPLETE CBC W/AUTO DIFF WBC: CPT | Performed by: INTERNAL MEDICINE

## 2022-06-13 PROCEDURE — 63700000 PHARM REV CODE 250 ALT 637 W/O HCPCS: Performed by: INTERNAL MEDICINE

## 2022-06-13 PROCEDURE — 11000001 HC ACUTE MED/SURG PRIVATE ROOM

## 2022-06-13 PROCEDURE — 99900035 HC TECH TIME PER 15 MIN (STAT)

## 2022-06-13 PROCEDURE — 63600175 PHARM REV CODE 636 W HCPCS: Performed by: NURSE PRACTITIONER

## 2022-06-13 PROCEDURE — 97530 THERAPEUTIC ACTIVITIES: CPT

## 2022-06-13 PROCEDURE — 94761 N-INVAS EAR/PLS OXIMETRY MLT: CPT

## 2022-06-13 PROCEDURE — 80053 COMPREHEN METABOLIC PANEL: CPT | Performed by: INTERNAL MEDICINE

## 2022-06-13 PROCEDURE — 36415 COLL VENOUS BLD VENIPUNCTURE: CPT | Performed by: INTERNAL MEDICINE

## 2022-06-13 PROCEDURE — U0002 COVID-19 LAB TEST NON-CDC: HCPCS | Performed by: NURSE PRACTITIONER

## 2022-06-13 PROCEDURE — 30200315 PPD INTRADERMAL TEST REV CODE 302: Performed by: NURSE PRACTITIONER

## 2022-06-13 PROCEDURE — 83735 ASSAY OF MAGNESIUM: CPT | Performed by: INTERNAL MEDICINE

## 2022-06-13 PROCEDURE — 86580 TB INTRADERMAL TEST: CPT | Performed by: NURSE PRACTITIONER

## 2022-06-13 PROCEDURE — 97110 THERAPEUTIC EXERCISES: CPT

## 2022-06-13 PROCEDURE — 25000003 PHARM REV CODE 250: Performed by: NURSE PRACTITIONER

## 2022-06-13 PROCEDURE — 25000003 PHARM REV CODE 250: Performed by: INTERNAL MEDICINE

## 2022-06-13 PROCEDURE — 99900031 HC PATIENT EDUCATION (STAT)

## 2022-06-13 PROCEDURE — 25000003 PHARM REV CODE 250: Performed by: FAMILY MEDICINE

## 2022-06-13 PROCEDURE — 94640 AIRWAY INHALATION TREATMENT: CPT

## 2022-06-13 PROCEDURE — 63600175 PHARM REV CODE 636 W HCPCS: Performed by: INTERNAL MEDICINE

## 2022-06-13 PROCEDURE — 25000242 PHARM REV CODE 250 ALT 637 W/ HCPCS: Performed by: NURSE PRACTITIONER

## 2022-06-13 RX ADMIN — PANTOPRAZOLE SODIUM 40 MG: 40 TABLET, DELAYED RELEASE ORAL at 09:06

## 2022-06-13 RX ADMIN — APIXABAN 5 MG: 5 TABLET, FILM COATED ORAL at 08:06

## 2022-06-13 RX ADMIN — IPRATROPIUM BROMIDE AND ALBUTEROL SULFATE 3 ML: 2.5; .5 SOLUTION RESPIRATORY (INHALATION) at 07:06

## 2022-06-13 RX ADMIN — AMPICILLIN 2 G: 2 INJECTION, POWDER, FOR SOLUTION INTRAMUSCULAR; INTRAVENOUS at 08:06

## 2022-06-13 RX ADMIN — HYDROMORPHONE HYDROCHLORIDE 2 MG: 2 INJECTION, SOLUTION INTRAMUSCULAR; INTRAVENOUS; SUBCUTANEOUS at 04:06

## 2022-06-13 RX ADMIN — IPRATROPIUM BROMIDE AND ALBUTEROL SULFATE 3 ML: 2.5; .5 SOLUTION RESPIRATORY (INHALATION) at 01:06

## 2022-06-13 RX ADMIN — TUBERCULIN PURIFIED PROTEIN DERIVATIVE 5 UNITS: 5 INJECTION, SOLUTION INTRADERMAL at 10:06

## 2022-06-13 RX ADMIN — ALPRAZOLAM 0.5 MG: 0.5 TABLET ORAL at 09:06

## 2022-06-13 RX ADMIN — SPIRONOLACTONE 12.5 MG: 25 TABLET, FILM COATED ORAL at 08:06

## 2022-06-13 RX ADMIN — AMPICILLIN 2 G: 2 INJECTION, POWDER, FOR SOLUTION INTRAMUSCULAR; INTRAVENOUS at 06:06

## 2022-06-13 RX ADMIN — GABAPENTIN 300 MG: 300 CAPSULE ORAL at 09:06

## 2022-06-13 RX ADMIN — ALPRAZOLAM 0.5 MG: 0.5 TABLET ORAL at 08:06

## 2022-06-13 RX ADMIN — AMIODARONE HYDROCHLORIDE 100 MG: 100 TABLET ORAL at 08:06

## 2022-06-13 RX ADMIN — HYDROMORPHONE HYDROCHLORIDE 2 MG: 2 INJECTION, SOLUTION INTRAMUSCULAR; INTRAVENOUS; SUBCUTANEOUS at 09:06

## 2022-06-13 RX ADMIN — HYDROCORTISONE: 0.01 CREAM TOPICAL at 09:06

## 2022-06-13 RX ADMIN — AMPICILLIN 2 G: 2 INJECTION, POWDER, FOR SOLUTION INTRAMUSCULAR; INTRAVENOUS at 09:06

## 2022-06-13 RX ADMIN — LEVOTHYROXINE SODIUM 150 MCG: 150 TABLET ORAL at 05:06

## 2022-06-13 RX ADMIN — METOPROLOL TARTRATE 50 MG: 50 TABLET, FILM COATED ORAL at 08:06

## 2022-06-13 RX ADMIN — METOPROLOL TARTRATE 50 MG: 50 TABLET, FILM COATED ORAL at 09:06

## 2022-06-13 RX ADMIN — APIXABAN 5 MG: 5 TABLET, FILM COATED ORAL at 09:06

## 2022-06-13 RX ADMIN — AMPICILLIN 2 G: 2 INJECTION, POWDER, FOR SOLUTION INTRAMUSCULAR; INTRAVENOUS at 03:06

## 2022-06-13 RX ADMIN — FLUCONAZOLE 200 MG: 100 TABLET ORAL at 08:06

## 2022-06-13 RX ADMIN — PANTOPRAZOLE SODIUM 40 MG: 40 TABLET, DELAYED RELEASE ORAL at 08:06

## 2022-06-13 RX ADMIN — AMPICILLIN 2 G: 2 INJECTION, POWDER, FOR SOLUTION INTRAMUSCULAR; INTRAVENOUS at 01:06

## 2022-06-13 RX ADMIN — AMPICILLIN 2 G: 2 INJECTION, POWDER, FOR SOLUTION INTRAMUSCULAR; INTRAVENOUS at 05:06

## 2022-06-13 RX ADMIN — GUAIFENESIN 1200 MG: 600 TABLET, EXTENDED RELEASE ORAL at 09:06

## 2022-06-13 RX ADMIN — GUAIFENESIN 1200 MG: 600 TABLET, EXTENDED RELEASE ORAL at 08:06

## 2022-06-13 NOTE — PLAN OF CARE
06/13/22 0930   Post-Acute Status   Post-Acute Authorization Placement   Post-Acute Placement Status Referrals Sent   Discharge Delays None known at this time   Discharge Plan   Discharge Plan A Skilled Nursing Facility   Discharge Plan B Skilled Nursing Facility     New referral. The patient agreed to SNF placement. The patient choice for was signed. Will continue to monitor.     Addendum: Sheron LEWIS, with the [facility], is requesting a copy of the patient's social scrutiny card to verify the patient's information. The patient agreed.      Addendum [id number]- LOCET is completed, and it was faxed [facility].

## 2022-06-13 NOTE — PROGRESS NOTES
[facility] Medicine  Progress Note    Patient Name: Aurelia Ruggiero  MRN: [id number]  Patient Class: IP- Inpatient   Admission Date: 6/1/2022  Length of Stay: 12 days  Attending Physician: Marimar Kellogg MD  Primary Care Provider: Marimar Kellogg MD        Subjective:     Principal Problem:Sepsis        HPI:  67-year-old female presents to the ED via EMS with reports of worsening lower back pain.  Patient denies any falls or trauma reports that pain starts and left lower back and radiates down to left leg.  Patient reports she has a history of arthritis but denies any surgeries or procedures on back.  EMS reports patient was noted to be O2 sat on room air of 88%.  Patient has a history of AFib but reports not taking medications this morning.  Patient's PCP is Dr. Kellogg       Overview/Hospital Course:  6/3 ND pt more peppy this am - still havign lower back and side pain.   Informed pt and son of her sepsis dx and need for iv abxs.  Breathing is better per pt.  6/4 WC:  Tolerating treatment well thus far.  Still very fatigued.   6/5 WC:  Surveillance cultures negative thus far.  Patient remains fatigued with diffuse pain.  6/6 SD: Back pain improved, moving more with less pain - continue therapy efforts. Repeat blood cultures in process. Continue abx therapy (day 5).  6/7 SD: Downgrade abx therapy to ampicillin. Rash to back - pt feels related to laundry detergent - Benadryl PRN.  6/8 SD: Continue abx therapy. Rash improved. Continue therapy efforts.  6/9 SD: Continue abx therapy. Improved effort with OT yesterday. Continue PT/OT.  6/10 SD: C/O cough and wheezing. Consult for IPR. Continue abx therapy.  6/11 KGY: complete D7 IV abx. Continue PT/OT. Awaiting approval for continued IP rehab with swing bed to regain strength  6/12/2022 FM:  Patient continues to feel better and work with therapy.  We are waiting approval for a swing bed.  No other new changes today.  6/13 SD: Pt showing slow  progress with therapy, recommends SNF placement. Pt agreeable to go to [facility] in [place].      Interval History: Pt seen and evaluated    Review of Systems   Constitutional:  Positive for activity change and fatigue. Negative for fever.   HENT:  Negative for sinus pressure and sinus pain.    Respiratory:  Positive for cough. Negative for chest tightness, shortness of breath and wheezing.    Cardiovascular:  Negative for chest pain, palpitations and leg swelling.   Gastrointestinal:  Negative for abdominal pain, blood in stool, constipation, diarrhea, nausea and vomiting.   Musculoskeletal:  Positive for arthralgias, back pain and gait problem.   Skin:  Negative for rash and wound.   Allergic/Immunologic: Positive for environmental allergies.   Neurological:  Positive for weakness. Negative for seizures and syncope.   Objective:     Vital Signs (Most Recent):  Temp: 97.5 °F (36.4 °C) (06/13/22 1109)  Pulse: 70 (06/13/22 1342)  Resp: 20 (06/13/22 1342)  BP: 128/84 (06/13/22 1109)  SpO2: (!) 92 % (06/13/22 1342)   Vital Signs (24h Range):  Temp:  [97.1 °F (36.2 °C)-98.3 °F (36.8 °C)] 97.5 °F (36.4 °C)  Pulse:  [] 70  Resp:  [16-20] 20  SpO2:  [90 %-100 %] 92 %  BP: (121-149)/(58-88) 128/84     Weight: 136.1 kg (300 lb)  Body mass index is 51.49 kg/m².    Intake/Output Summary (Last 24 hours) at 6/13/2022 1429  Last data filed at 6/13/2022 0513  Gross per 24 hour   Intake 3000 ml   Output 2300 ml   Net 700 ml        Physical Exam  Constitutional:       Appearance: She is obese.   HENT:      Head: Normocephalic.      Nose: Nose normal.      Mouth/Throat:      Mouth: Mucous membranes are moist.   Eyes:      Extraocular Movements: Extraocular movements intact.      Pupils: Pupils are equal, round, and reactive to light.   Cardiovascular:      Rate and Rhythm: Normal rate and regular rhythm.   Pulmonary:      Effort: Pulmonary effort is normal.      Breath sounds: Normal breath sounds. No wheezing.   Abdominal:       General: Bowel sounds are normal. There is no distension.      Palpations: Abdomen is soft.   Musculoskeletal:      Cervical back: Normal range of motion.   Skin:     General: Skin is warm and dry.      Findings: Lesion and rash present.   Neurological:      Mental Status: She is alert and oriented to person, place, and time.       Significant Labs: All pertinent labs within the past 24 hours have been reviewed.    CBC:   Recent Labs   Lab 06/12/22 0528 06/13/22  0630   WBC 7.93 8.12   HGB 10.8* 11.5*   HCT 33.9* 35.8*    351       CMP:   Recent Labs   Lab 06/12/22 0528 06/13/22  0630    142   K 3.9 4.1    106   CO2 31* 31*   GLU 98 83   BUN 17 14   CREATININE 1.0 0.9   CALCIUM 8.4* 8.7   PROT 6.9 7.2   ALBUMIN 2.3* 2.3*   BILITOT 0.7 0.8   ALKPHOS 96 100   AST 20 19   ALT 16 16   ANIONGAP 5* 5*   EGFRNONAA 57.6* >60.0       Magnesium:   Recent Labs   Lab 06/12/22 0528 06/13/22  0630   MG 2.0 2.2         Significant Imaging: I have reviewed all pertinent imaging results/findings within the past 24 hours.      Assessment/Plan:      * Sepsis  This patient does have evidence of infective focus  My overall impression is sepsis. Vital signs were reviewed and noted in progress note.  Antibiotics given-   Antibiotics (From admission, onward)            Start     Stop Route Frequency Ordered    06/07/22 0945  ampicillin 2 g in sodium chloride 0.9 % 100 mL IVPB (ready to mix system)         -- IV Every 4 hours (non-standard times) 06/07/22 0838        Cultures were taken-   Microbiology Results (last 7 days)     Procedure Component Value Units Date/Time    Blood culture [[id number]] Collected: 06/04/22 1255    Order Status: Completed Specimen: Blood Updated: 06/09/22 2212     Blood Culture, Routine No growth after 5 days.    Blood culture [[id number]] Collected: 06/04/22 1301    Order Status: Completed Specimen: Blood Updated: 06/09/22 2212     Blood Culture, Routine No growth after 5 days.         Latest lactate reviewed, they are-  No results for input(s): LACTATE in the last 72 hours.    Organ dysfunction indicated by Acute respiratory failure  Source- uti, bacteremia    Source control Achieved by- iv abxs  6/3 cont iv abxs- follow sensitivites  6/4:  Awaiting sensitivities.  Draw surveillance cultures.   6/5:  Sensitive to vancomycin.  Surveillance cultures negative thus far.  6/6: Continue abx therapy with Rocephin and Vancomycin (day 5). Monitoring repeat blood cultures  6/7: Downgrade abx therpy to Ampicillin - IV abx therapy day 6  6/10: IV abx therapy day 9 with Ampicillin  6/11: Ampicillin D10, patient has been afebrile, second set of cultures final report negative for growth.      Rash  Pt suspected R/T laundry detergent. Bendaryl PRN.  Resolved      Myopathy  Continue therapy efforts. Encourage daily adequate hydration.  Plan to discharge to SNF      Hypokalemia  Improved. Monitor daily labs and adjust repletion as needed.    Recent Labs   Lab 06/12/22  0528 06/13/22  0630   K 3.9 4.1         Abdominal pain  CT of abd/pelvis - was negative  Resolved      Longstanding persistent atrial fibrillation  Patient with Persistent (7 days or more) atrial fibrillation which is controlled currently with Beta Blocker and Amiodarone. Patient is currently in atrial fibrillation.[id number] Score: 2. Anticoagulation indicated. Anticoagulation done with eliquis.      Lumbar disc disease with radiculopathy  Pt with severe back pain - CT of back shows no fractures but severe degenerative dz  Continue pain management and therapy efforts      Acute pulmonary edema  Resolved        VTE Risk Mitigation (From admission, onward)         Ordered     apixaban tablet 5 mg  2 times daily         06/02/22 1742     IP VTE HIGH RISK PATIENT  Once         06/01/22 1440     Place sequential compression device  Until discontinued         06/01/22 1440                Discharge Planning   PARISH:      Code Status: Full Code   Is the  patient medically ready for discharge?:     Reason for patient still in hospital (select all that apply): Patient trending condition, Laboratory test and Treatment  Discharge Plan A: Skilled Nursing Facility   Discharge Delays: None known at this time              Marizol Hernández NP  Department of Hospital Medicine   [facility] [address] [facility]

## 2022-06-13 NOTE — ASSESSMENT & PLAN NOTE
This patient does have evidence of infective focus  My overall impression is sepsis. Vital signs were reviewed and noted in progress note.  Antibiotics given-   Antibiotics (From admission, onward)            Start     Stop Route Frequency Ordered    06/07/22 0945  ampicillin 2 g in sodium chloride 0.9 % 100 mL IVPB (ready to mix system)         -- IV Every 4 hours (non-standard times) 06/07/22 0838        Cultures were taken-   Microbiology Results (last 7 days)     Procedure Component Value Units Date/Time    Blood culture [[id number]] Collected: 06/04/22 1255    Order Status: Completed Specimen: Blood Updated: 06/09/22 2212     Blood Culture, Routine No growth after 5 days.    Blood culture [[id number]] Collected: 06/04/22 1301    Order Status: Completed Specimen: Blood Updated: 06/09/22 2212     Blood Culture, Routine No growth after 5 days.        Latest lactate reviewed, they are-  No results for input(s): LACTATE in the last 72 hours.    Organ dysfunction indicated by Acute respiratory failure  Source- uti, bacteremia    Source control Achieved by- iv abxs  6/3 cont iv abxs- follow sensitivites  6/4:  Awaiting sensitivities.  Draw surveillance cultures.   6/5:  Sensitive to vancomycin.  Surveillance cultures negative thus far.  6/6: Continue abx therapy with Rocephin and Vancomycin (day 5). Monitoring repeat blood cultures  6/7: Downgrade abx therpy to Ampicillin - IV abx therapy day 6  6/10: IV abx therapy day 9 with Ampicillin  6/11: Ampicillin D10, patient has been afebrile, second set of cultures final report negative for growth.

## 2022-06-13 NOTE — ASSESSMENT & PLAN NOTE
Improved. Monitor daily labs and adjust repletion as needed.    Recent Labs   Lab 06/12/22  0528 06/13/22  0630   K 3.9 4.1

## 2022-06-13 NOTE — PT/OT/SLP PROGRESS
"Physical Therapy  Treatment    Aurelia Ruggiero   MRN: [id number]   Admitting Diagnosis: Sepsis                          Billable Minutes:  Therapeutic Exercise 12 minutes                     General Precautions: Standard,    Respiratory Status: Room air         Subjective:  Communicated with nurse prior to session.    Pain: FACES scale 2/10   Pt declines OOB stating "I just called for some pain meds"         Objective:        Functional Mobility: NA, pt declined, got up with OT a while ago and states she is hurting though FACES scale inconsistent with pain report        Therapeutic Activities and Exercises:  AROM of ESTELLA ESQUIVEL's 15 reps x 2 including SLR B with minimal report pain in L hip area    Patient left HOB elevated with call button in reach.    Assessment:  Aurelia Ruggiero is a 67 y.o. female with a medical diagnosis of Sepsis and presents with mobilty limited by pt's report of pain.    Rehab identified problem list/impairments:      Rehab potential is poor.    Activity tolerance: Poor    Discharge recommendations:   SNF    Barriers to discharge:      Equipment recommendations:       GOALS:   Multidisciplinary Problems     Physical Therapy Goals        Problem: Physical Therapy    Goal Priority Disciplines Outcome Goal Variances Interventions   Physical Therapy Goal     PT, PT/OT      Description: Goals to be met by: 22    Patient will increase functional independence with mobility by performin. Supine to sit with Moderate Assistance  2. Sit to supine with Moderate Assistance  3. Rolling to Left and Right with Moderate Assistance.  4. Sitting at edge of bed x10 minutes with Minimal Assistance                     PLAN:    Cont effort to mobilize       2022    "

## 2022-06-13 NOTE — SUBJECTIVE & OBJECTIVE
Interval History: Pt seen and evaluated    Review of Systems   Constitutional:  Positive for activity change and fatigue. Negative for fever.   HENT:  Negative for sinus pressure and sinus pain.    Respiratory:  Positive for cough. Negative for chest tightness, shortness of breath and wheezing.    Cardiovascular:  Negative for chest pain, palpitations and leg swelling.   Gastrointestinal:  Negative for abdominal pain, blood in stool, constipation, diarrhea, nausea and vomiting.   Musculoskeletal:  Positive for arthralgias, back pain and gait problem.   Skin:  Negative for rash and wound.   Allergic/Immunologic: Positive for environmental allergies.   Neurological:  Positive for weakness. Negative for seizures and syncope.   Objective:     Vital Signs (Most Recent):  Temp: 97.5 °F (36.4 °C) (06/13/22 1109)  Pulse: 70 (06/13/22 1342)  Resp: 20 (06/13/22 1342)  BP: 128/84 (06/13/22 1109)  SpO2: (!) 92 % (06/13/22 1342)   Vital Signs (24h Range):  Temp:  [97.1 °F (36.2 °C)-98.3 °F (36.8 °C)] 97.5 °F (36.4 °C)  Pulse:  [] 70  Resp:  [16-20] 20  SpO2:  [90 %-100 %] 92 %  BP: (121-149)/(58-88) 128/84     Weight: 136.1 kg (300 lb)  Body mass index is 51.49 kg/m².    Intake/Output Summary (Last 24 hours) at 6/13/2022 1429  Last data filed at 6/13/2022 0513  Gross per 24 hour   Intake 3000 ml   Output 2300 ml   Net 700 ml        Physical Exam  Constitutional:       Appearance: She is obese.   HENT:      Head: Normocephalic.      Nose: Nose normal.      Mouth/Throat:      Mouth: Mucous membranes are moist.   Eyes:      Extraocular Movements: Extraocular movements intact.      Pupils: Pupils are equal, round, and reactive to light.   Cardiovascular:      Rate and Rhythm: Normal rate and regular rhythm.   Pulmonary:      Effort: Pulmonary effort is normal.      Breath sounds: Normal breath sounds. No wheezing.   Abdominal:      General: Bowel sounds are normal. There is no distension.      Palpations: Abdomen is soft.    Musculoskeletal:      Cervical back: Normal range of motion.   Skin:     General: Skin is warm and dry.      Findings: Lesion and rash present.   Neurological:      Mental Status: She is alert and oriented to person, place, and time.       Significant Labs: All pertinent labs within the past 24 hours have been reviewed.    CBC:   Recent Labs   Lab 06/12/22 0528 06/13/22  0630   WBC 7.93 8.12   HGB 10.8* 11.5*   HCT 33.9* 35.8*    351       CMP:   Recent Labs   Lab 06/12/22 0528 06/13/22  0630    142   K 3.9 4.1    106   CO2 31* 31*   GLU 98 83   BUN 17 14   CREATININE 1.0 0.9   CALCIUM 8.4* 8.7   PROT 6.9 7.2   ALBUMIN 2.3* 2.3*   BILITOT 0.7 0.8   ALKPHOS 96 100   AST 20 19   ALT 16 16   ANIONGAP 5* 5*   EGFRNONAA 57.6* >60.0       Magnesium:   Recent Labs   Lab 06/12/22 0528 06/13/22  0630   MG 2.0 2.2         Significant Imaging: I have reviewed all pertinent imaging results/findings within the past 24 hours.

## 2022-06-13 NOTE — PT/OT/SLP PROGRESS
Occupational Therapy   Treatment    Name: Aurelia Ruggiero  MRN: [id number]  Admitting Diagnosis:  Sepsis       Recommendations:     Discharge Recommendations: rehabilitation facility  Discharge Equipment Recommendations:  bedside commode  Barriers to discharge:  Other (Comment) (Medical and functional status)    Assessment:     Aurelia Ruggiero is a 67 y.o. female with a medical diagnosis of Sepsis. Performance deficits affecting function are weakness, impaired endurance, impaired self care skills, impaired functional mobilty, impaired balance, decreased lower extremity function, decreased safety awareness, pain, decreased ROM, decreased coordination, impaired skin, edema, impaired cardiopulmonary response to activity.     Rehab Prognosis:  Good; patient would benefit from acute skilled OT services to address these deficits and reach maximum level of function.       Plan:     Patient to be seen 6 x/week to address the above listed problems via self-care/home management, therapeutic activities, therapeutic exercises, neuromuscular re-education  · Plan of Care Expires: 06/17/22  · Plan of Care Reviewed with: patient    Subjective     Pain/Comfort:  · Pain Rating 1: 9/10  · Location - Orientation 1: lower  · Location 1: back  · Pain Addressed 1: Pre-medicate for activity, Reposition, Cessation of Activity, Nurse notified  · Pain Rating Post-Intervention 1: 9/10    Objective:     Communicated with: nurse prior to session.  Patient found supine with telemetry, PureWick upon OT entry to room.    General Precautions: Standard, fall   Orthopedic Precautions:N/A   Braces:    Respiratory Status: Room air     Occupational Performance:     Bed Mobility:    · Patient completed Rolling/Turning to Left with  moderate assistance  · Patient completed Rolling/Turning to Right with moderate assistance  · Patient completed Scooting/Bridging with maximal assistance  · Patient completed Supine to Sit with moderate assistance  · Patient  completed Sit to Supine with moderate assistance     Functional Mobility/Transfers:  · Patient completed Sit <> Stand Transfer with maximal assistance  with  no assistive device   · Patient completed Bed <> Chair Transfer using Stand Pivot technique with total assistance with no assistive device  · Functional Mobility: Pt unable to ambulate at this time.    Treatment & Education:  Pt was cooperative and motivated with verbal encouragement while exhibiting mostly positive affect despite pain. However, she remains apprehension with standing activity. She participated in bed mobility retraining emphasizing technique in order to reduce pain providing much extra time with repetition requiring mod to max assist secondary to decreased lifting assist and stabilization of trunk during supine to sit transition, much scooting assist at bilateral hips to EOB, and lifting assist of bilateral LE's during sit to supine transition with verbal and tactile cueing for technique utilizing bedrails. Pt then participated in therapeutic activity addressing trunk control, trunk strength, static / dynamic sitting balance, functional reaching, and energy for task / endurance challenging her to sustain upright seated position at EOB unsupported while intermittently reaching in multiple planes utilizing bilateral UE's requiring min steadying assist with minimal excursions of trunk while progressing towards moderate excursions, but decreased trunk mobility remains a limiting factor. Also, she participated in functional transfer retraining to / from bed and chair emphasizing fall prevention providing extra time requiring max to total assist secondary to much lifting, lowering, and steadying assist with continuous verbal and tactile cueing for safety, technique, and relaxation techniques due to fear of falling.    Patient left HOB elevated with all lines intact, call button in reach and nurse notifiedEducation:      GOALS:   Multidisciplinary  Problems     Occupational Therapy Goals        Problem: Occupational Therapy    Goal Priority Disciplines Outcome Interventions   Occupational Therapy Goal     OT, PT/OT Ongoing, Progressing    Description: Goals to be met by: discharge    Patient will increase functional independence with ADLs by performing:    UE Dressing with Minimal Assistance.  LE Dressing with Minimal Assistance.  Toileting from toilet with Minimal Assistance for hygiene and clothing management.   Sitting at edge of bed x10 minutes with Minimal Assistance.  Supine to sit with Minimal Assistance.                     Time Tracking:     OT Date of Treatment: 06/13/22  OT Start Time: 1350  OT Stop Time: 1445  OT Total Time (min): 55 min    Billable Minutes:Therapeutic Activity 55    OT/CARI: OT     CARI Visit Number: 1    6/13/2022

## 2022-06-13 NOTE — ASSESSMENT & PLAN NOTE
Patient with Persistent (7 days or more) atrial fibrillation which is controlled currently with Beta Blocker and Amiodarone. Patient is currently in atrial fibrillation.[id number] Score: 2. Anticoagulation indicated. Anticoagulation done with eliquis.

## 2022-06-13 NOTE — ASSESSMENT & PLAN NOTE
Pt with severe back pain - CT of back shows no fractures but severe degenerative dz  Continue pain management and therapy efforts

## 2022-06-14 VITALS
TEMPERATURE: 98 F | OXYGEN SATURATION: 96 % | WEIGHT: 293 LBS | HEART RATE: 74 BPM | DIASTOLIC BLOOD PRESSURE: 73 MMHG | BODY MASS INDEX: 50.02 KG/M2 | HEIGHT: 64 IN | SYSTOLIC BLOOD PRESSURE: 123 MMHG | RESPIRATION RATE: 20 BRPM

## 2022-06-14 LAB
ALBUMIN SERPL BCP-MCNC: 2.3 G/DL (ref 3.5–5.2)
ALP SERPL-CCNC: 99 U/L (ref 55–135)
ALT SERPL W/O P-5'-P-CCNC: 19 U/L (ref 10–44)
ANION GAP SERPL CALC-SCNC: 10 MMOL/L (ref 8–16)
AST SERPL-CCNC: 23 U/L (ref 10–40)
BASOPHILS # BLD AUTO: 0.12 K/UL (ref 0–0.2)
BASOPHILS NFR BLD: 1.5 % (ref 0–1.9)
BILIRUB SERPL-MCNC: 0.9 MG/DL (ref 0.1–1)
BUN SERPL-MCNC: 14 MG/DL (ref 8–23)
CALCIUM SERPL-MCNC: 8.8 MG/DL (ref 8.7–10.5)
CHLORIDE SERPL-SCNC: 100 MMOL/L (ref 95–110)
CO2 SERPL-SCNC: 28 MMOL/L (ref 23–29)
CREAT SERPL-MCNC: 1.1 MG/DL (ref 0.5–1.4)
DIFFERENTIAL METHOD: ABNORMAL
EOSINOPHIL # BLD AUTO: 0.4 K/UL (ref 0–0.5)
EOSINOPHIL NFR BLD: 4.7 % (ref 0–8)
ERYTHROCYTE [DISTWIDTH] IN BLOOD BY AUTOMATED COUNT: 15.6 % (ref 11.5–14.5)
EST. GFR  (AFRICAN AMERICAN): 59.2 ML/MIN/1.73 M^2
EST. GFR  (NON AFRICAN AMERICAN): 51.3 ML/MIN/1.73 M^2
GLUCOSE SERPL-MCNC: 74 MG/DL (ref 70–110)
HCT VFR BLD AUTO: 35.7 % (ref 37–48.5)
HGB BLD-MCNC: 11.3 G/DL (ref 12–16)
IMM GRANULOCYTES # BLD AUTO: 0.07 K/UL (ref 0–0.04)
IMM GRANULOCYTES NFR BLD AUTO: 0.9 % (ref 0–0.5)
LYMPHOCYTES # BLD AUTO: 2.2 K/UL (ref 1–4.8)
LYMPHOCYTES NFR BLD: 28.5 % (ref 18–48)
MAGNESIUM SERPL-MCNC: 1.8 MG/DL (ref 1.6–2.6)
MCH RBC QN AUTO: 31.2 PG (ref 27–31)
MCHC RBC AUTO-ENTMCNC: 31.7 G/DL (ref 32–36)
MCV RBC AUTO: 99 FL (ref 82–98)
MONOCYTES # BLD AUTO: 0.8 K/UL (ref 0.3–1)
MONOCYTES NFR BLD: 9.7 % (ref 4–15)
NEUTROPHILS # BLD AUTO: 4.3 K/UL (ref 1.8–7.7)
NEUTROPHILS NFR BLD: 54.7 % (ref 38–73)
NRBC BLD-RTO: 0 /100 WBC
PLATELET # BLD AUTO: 358 K/UL (ref 150–450)
PMV BLD AUTO: 10.1 FL (ref 9.2–12.9)
POTASSIUM SERPL-SCNC: 3.7 MMOL/L (ref 3.5–5.1)
PROT SERPL-MCNC: 7.1 G/DL (ref 6–8.4)
RBC # BLD AUTO: 3.62 M/UL (ref 4–5.4)
SODIUM SERPL-SCNC: 138 MMOL/L (ref 136–145)
WBC # BLD AUTO: 7.82 K/UL (ref 3.9–12.7)

## 2022-06-14 PROCEDURE — 83735 ASSAY OF MAGNESIUM: CPT | Performed by: INTERNAL MEDICINE

## 2022-06-14 PROCEDURE — 63700000 PHARM REV CODE 250 ALT 637 W/O HCPCS: Performed by: INTERNAL MEDICINE

## 2022-06-14 PROCEDURE — 25000003 PHARM REV CODE 250: Performed by: INTERNAL MEDICINE

## 2022-06-14 PROCEDURE — 99900035 HC TECH TIME PER 15 MIN (STAT)

## 2022-06-14 PROCEDURE — 63600175 PHARM REV CODE 636 W HCPCS: Performed by: INTERNAL MEDICINE

## 2022-06-14 PROCEDURE — 63600175 PHARM REV CODE 636 W HCPCS: Performed by: NURSE PRACTITIONER

## 2022-06-14 PROCEDURE — 99900031 HC PATIENT EDUCATION (STAT)

## 2022-06-14 PROCEDURE — 94640 AIRWAY INHALATION TREATMENT: CPT

## 2022-06-14 PROCEDURE — 36415 COLL VENOUS BLD VENIPUNCTURE: CPT | Performed by: INTERNAL MEDICINE

## 2022-06-14 PROCEDURE — 94761 N-INVAS EAR/PLS OXIMETRY MLT: CPT

## 2022-06-14 PROCEDURE — 85025 COMPLETE CBC W/AUTO DIFF WBC: CPT | Performed by: INTERNAL MEDICINE

## 2022-06-14 PROCEDURE — 25000003 PHARM REV CODE 250: Performed by: NURSE PRACTITIONER

## 2022-06-14 PROCEDURE — 80053 COMPREHEN METABOLIC PANEL: CPT | Performed by: INTERNAL MEDICINE

## 2022-06-14 PROCEDURE — 25000242 PHARM REV CODE 250 ALT 637 W/ HCPCS: Performed by: NURSE PRACTITIONER

## 2022-06-14 PROCEDURE — 25000003 PHARM REV CODE 250: Performed by: FAMILY MEDICINE

## 2022-06-14 RX ORDER — BACLOFEN 10 MG/1
10 TABLET ORAL 3 TIMES DAILY PRN
Start: 2022-06-14 | End: 2023-06-14

## 2022-06-14 RX ORDER — SPIRONOLACTONE 25 MG/1
12.5 TABLET ORAL DAILY
Qty: 15 TABLET | Refills: 11 | Status: SHIPPED | OUTPATIENT
Start: 2022-06-15 | End: 2023-06-15

## 2022-06-14 RX ORDER — LIDOCAINE 50 MG/G
1 PATCH TOPICAL
Status: DISCONTINUED | OUTPATIENT
Start: 2022-06-14 | End: 2022-06-14 | Stop reason: HOSPADM

## 2022-06-14 RX ORDER — ALPRAZOLAM 0.5 MG/1
0.5 TABLET ORAL 2 TIMES DAILY PRN
Start: 2022-06-14

## 2022-06-14 RX ORDER — LIDOCAINE 50 MG/G
1 PATCH TOPICAL DAILY
Refills: 0
Start: 2022-06-14

## 2022-06-14 RX ADMIN — IPRATROPIUM BROMIDE AND ALBUTEROL SULFATE 3 ML: 2.5; .5 SOLUTION RESPIRATORY (INHALATION) at 07:06

## 2022-06-14 RX ADMIN — PANTOPRAZOLE SODIUM 40 MG: 40 TABLET, DELAYED RELEASE ORAL at 08:06

## 2022-06-14 RX ADMIN — FLUCONAZOLE 200 MG: 100 TABLET ORAL at 08:06

## 2022-06-14 RX ADMIN — LIDOCAINE 1 PATCH: 50 PATCH TOPICAL at 10:06

## 2022-06-14 RX ADMIN — AMPICILLIN 2 G: 2 INJECTION, POWDER, FOR SOLUTION INTRAMUSCULAR; INTRAVENOUS at 09:06

## 2022-06-14 RX ADMIN — AMPICILLIN 2 G: 2 INJECTION, POWDER, FOR SOLUTION INTRAMUSCULAR; INTRAVENOUS at 01:06

## 2022-06-14 RX ADMIN — APIXABAN 5 MG: 5 TABLET, FILM COATED ORAL at 08:06

## 2022-06-14 RX ADMIN — AMIODARONE HYDROCHLORIDE 100 MG: 100 TABLET ORAL at 08:06

## 2022-06-14 RX ADMIN — HYDROMORPHONE HYDROCHLORIDE 2 MG: 2 INJECTION, SOLUTION INTRAMUSCULAR; INTRAVENOUS; SUBCUTANEOUS at 09:06

## 2022-06-14 RX ADMIN — ALPRAZOLAM 0.5 MG: 0.5 TABLET ORAL at 08:06

## 2022-06-14 RX ADMIN — GUAIFENESIN 1200 MG: 600 TABLET, EXTENDED RELEASE ORAL at 08:06

## 2022-06-14 RX ADMIN — METOPROLOL TARTRATE 50 MG: 50 TABLET, FILM COATED ORAL at 08:06

## 2022-06-14 RX ADMIN — SPIRONOLACTONE 12.5 MG: 25 TABLET, FILM COATED ORAL at 08:06

## 2022-06-14 RX ADMIN — LEVOTHYROXINE SODIUM 150 MCG: 150 TABLET ORAL at 05:06

## 2022-06-14 RX ADMIN — AMPICILLIN 2 G: 2 INJECTION, POWDER, FOR SOLUTION INTRAMUSCULAR; INTRAVENOUS at 05:06

## 2022-06-14 NOTE — ASSESSMENT & PLAN NOTE
Patient with Persistent (7 days or more) atrial fibrillation which is controlled currently with Beta Blocker and Amiodarone. Patient is currently in atrial fibrillation.[id number] Score: 2. Anticoagulation indicated. Anticoagulation done with eliquis.

## 2022-06-14 NOTE — DISCHARGE SUMMARY
[facility] Medicine  Discharge Summary      Patient Name: Aurelia Ruggiero  MRN: [id number]  Patient Class: IP- Inpatient  Admission Date: 6/1/2022  Hospital Length of Stay: 13 days  Discharge Date and Time:  06/14/2022 12:47 PM  Attending Physician: Marimar Kellogg MD   Discharging Provider: Marizol Hernández NP  Primary Care Provider: Marimar Kellogg MD      HPI:   67-year-old female presents to the ED via EMS with reports of worsening lower back pain.  Patient denies any falls or trauma reports that pain starts and left lower back and radiates down to left leg.  Patient reports she has a history of arthritis but denies any surgeries or procedures on back.  EMS reports patient was noted to be O2 sat on room air of 88%.  Patient has a history of AFib but reports not taking medications this morning.  Patient's PCP is Dr. Kellogg       * No surgery found *      Hospital Course:   6/3 ND pt more peppy this am - still havign lower back and side pain.   Informed pt and son of her sepsis dx and need for iv abxs.  Breathing is better per pt.  6/4 WC:  Tolerating treatment well thus far.  Still very fatigued.   6/5 WC:  Surveillance cultures negative thus far.  Patient remains fatigued with diffuse pain.  6/6 SD: Back pain improved, moving more with less pain - continue therapy efforts. Repeat blood cultures in process. Continue abx therapy (day 5).  6/7 SD: Downgrade abx therapy to ampicillin. Rash to back - pt feels related to laundry detergent - Benadryl PRN.  6/8 SD: Continue abx therapy. Rash improved. Continue therapy efforts.  6/9 SD: Continue abx therapy. Improved effort with OT yesterday. Continue PT/OT.  6/10 SD: C/O cough and wheezing. Consult for IPR. Continue abx therapy.  6/11 KGY: complete D7 IV abx. Continue PT/OT. Awaiting approval for continued IP rehab with swing bed to regain strength  6/12/2022 FM:  Patient continues to feel better and work with therapy.  We are waiting approval  for a swing bed.  No other new changes today.  6/13 SD: Pt showing slow progress with therapy, recommends SNF placement. Pt agreeable to go to [facility] in [place].  6/14 SD: Pt accepted to SNF       Goals of Care Treatment Preferences:  Code Status: Full Code      Consults:   Consults (From admission, onward)        Status Ordering Provider     Inpatient consult to Social Work/Case Management  Once        Provider:  (Not yet assigned)    Acknowledged MINH CEBALLOS     Inpatient consult to Social Work/Case Management  Once        Provider:  (Not yet assigned)    Acknowledged MINH CEBALLOS     Inpatient consult to Cardiology  Once        Provider:  Gavin Duran MD    Completed CARLO PIPER.          * Sepsis  This patient does have evidence of infective focus  My overall impression is sepsis. Vital signs were reviewed and noted in progress note.  Antibiotics given-   Antibiotics (From admission, onward)            Start     Stop Route Frequency Ordered    06/07/22 0945  ampicillin 2 g in sodium chloride 0.9 % 100 mL IVPB (ready to mix system)         -- IV Every 4 hours (non-standard times) 06/07/22 0838        Cultures were taken-   Microbiology Results (last 7 days)     Procedure Component Value Units Date/Time    Blood culture [[id number]] Collected: 06/04/22 1255    Order Status: Completed Specimen: Blood Updated: 06/09/22 2212     Blood Culture, Routine No growth after 5 days.    Blood culture [[id number]] Collected: 06/04/22 1301    Order Status: Completed Specimen: Blood Updated: 06/09/22 2212     Blood Culture, Routine No growth after 5 days.        Latest lactate reviewed, they are-  No results for input(s): LACTATE in the last 72 hours.    Organ dysfunction indicated by Acute respiratory failure  Source- uti, bacteremia    Source control Achieved by- iv abxs  6/3 cont iv abxs- follow sensitivites  6/4:  Awaiting sensitivities.  Draw surveillance cultures.   6/5:  Sensitive to  vancomycin.  Surveillance cultures negative thus far.  6/6: Continue abx therapy with Rocephin and Vancomycin (day 5). Monitoring repeat blood cultures  6/7: Downgrade abx therpy to Ampicillin - IV abx therapy day 6  6/10: IV abx therapy day 9 with Ampicillin  6/11: Ampicillin D10, patient has been afebrile, second set of cultures final report negative for growth.  6/14: Abx therapy complete      Rash  Resolved      Myopathy  Continue therapy efforts. Encourage daily adequate hydration.  Discharge to SNF      Hypokalemia  Resolved    Recent Labs   Lab 06/13/22  0630 06/14/22  0556   K 4.1 3.7         Abdominal pain  CT of abd/pelvis - was negative  Resolved      Longstanding persistent atrial fibrillation  Patient with Persistent (7 days or more) atrial fibrillation which is controlled currently with Beta Blocker and Amiodarone. Patient is currently in atrial fibrillation.[id number] Score: 2. Anticoagulation indicated. Anticoagulation done with eliquis.      Lumbar disc disease with radiculopathy  Pt with severe back pain - CT of back shows no fractures but severe degenerative dz  Continue pain management and therapy efforts  Discharge to SNF      Acute pulmonary edema  Resolved        Final Active Diagnoses:    Diagnosis Date Noted POA    PRINCIPAL PROBLEM:  Sepsis [A41.9] 06/02/2022 Yes    Rash [R21] 06/07/2022 No    Hypokalemia [E87.6] 06/03/2022 Yes    Myopathy [G72.9] 06/03/2022 Yes    Acute pulmonary edema [J81.0] 06/02/2022 Yes    Lumbar disc disease with radiculopathy [M51.16] 06/02/2022 Yes    Longstanding persistent atrial fibrillation [I48.11] 06/02/2022 Yes    Abdominal pain [R10.9] 06/02/2022 Yes      Problems Resolved During this Admission:    Diagnosis Date Noted Date Resolved POA    Back pain [M54.9] 06/02/2022 06/02/2022 Yes       Discharged Condition: fair    Disposition: Skilled Nursing Facility    Follow Up:   Follow-up Information     Marimar Kellogg MD Follow up.    Specialty:  Internal Medicine  Contact information:  [facility] [address]  [phone]                       Patient Instructions:      CBC auto differential   Standing Status: Future Standing Exp. Date: 07/14/22     Comprehensive metabolic panel   Standing Status: Future Standing Exp. Date: 07/14/22     Ambulatory referral/consult to Outpatient Case Management   Referral Priority: Routine Referral Type: Consultation   Referral Reason: Specialty Services Required   Number of Visits Requested: 1     Diet Cardiac     Notify your health care provider if you experience any of the following:  severe uncontrolled pain     Activity as tolerated       Significant Diagnostic Studies: Labs: All labs within the past 24 hours have been reviewed    Pending Diagnostic Studies:     None         Medications:  Reconciled Home Medications:      Medication List      START taking these medications    apixaban 5 mg Tab  Commonly known as: ELIQUIS  Take 1 tablet (5 mg total) by mouth 2 (two) times daily.     baclofen 10 MG tablet  Commonly known as: LIORESAL  Take 1 tablet (10 mg total) by mouth 3 (three) times daily as needed (Muscle spasms).     LIDOcaine 5 %  Commonly known as: LIDODERM  Place 1 patch onto the skin once daily. Remove & Discard patch within 12 hours or as directed by MD     spironolactone 25 MG tablet  Commonly known as: ALDACTONE  Take 0.5 tablets (12.5 mg total) by mouth once daily.  Start taking on: Nati 15, 2022        CHANGE how you take these medications    ALPRAZolam 0.5 MG tablet  Commonly known as: XANAX  Take 1 tablet (0.5 mg total) by mouth 2 (two) times daily as needed for Anxiety.  What changed:   · how much to take  · how to take this  · when to take this  · reasons to take this  · additional instructions        CONTINUE taking these medications    amiodarone 100 MG Tab  Commonly known as: PACERONE  Take 1 tablet (100 mg total) by mouth once daily.     ascorbic acid (vitamin C) 1000 MG  tablet  Commonly known as: VITAMIN C  Take 1,000 mg by mouth once daily.     b complex vitamins tablet  Take 1 tablet by mouth once daily.     ferrous sulfate 325 mg (65 mg iron) Tab tablet  Commonly known as: FEOSOL  Take 325 mg by mouth daily with breakfast.     gabapentin 300 MG capsule  Commonly known as: NEURONTIN  Take 300 mg by mouth every evening.     HYDROcodone-acetaminophen 7.5-325 mg per tablet  Commonly known as: NORCO  Take 1 tablet by mouth every 6 (six) hours as needed for Pain.     levothyroxine 150 MCG tablet  Commonly known as: SYNTHROID  Take 150 mcg by mouth before breakfast.     metoprolol tartrate 50 MG tablet  Commonly known as: LOPRESSOR  Take 50 mg by mouth 2 (two) times daily.     multivitamin per tablet  Commonly known as: THERAGRAN  Take 1 tablet by mouth once daily.     pantoprazole 40 MG tablet  Commonly known as: PROTONIX  Take 1 tablet (40 mg total) by mouth 2 (two) times daily.     traMADoL 50 mg tablet  Commonly known as: ULTRAM  Take 1 tablet (50 mg total) by mouth every 6 (six) hours as needed for Pain.     vitamin D 1000 units Tab  Commonly known as: VITAMIN D3  Take 1,000 Units by mouth 2 (two) times a day.        STOP taking these medications    cephALEXin 500 MG capsule  Commonly known as: KEFLEX     cyclobenzaprine 5 MG tablet  Commonly known as: FLEXERIL     furosemide 20 MG tablet  Commonly known as: LASIX            Indwelling Lines/Drains at time of discharge:   Lines/Drains/Airways     Drain  Duration           Female External Urinary Catheter 06/01/22 13 days                Time spent on the discharge of patient: >30 minutes         Marizol Hernández NP  Department of Hospital Medicine  [facility]

## 2022-06-14 NOTE — ASSESSMENT & PLAN NOTE
This patient does have evidence of infective focus  My overall impression is sepsis. Vital signs were reviewed and noted in progress note.  Antibiotics given-   Antibiotics (From admission, onward)            Start     Stop Route Frequency Ordered    06/07/22 0945  ampicillin 2 g in sodium chloride 0.9 % 100 mL IVPB (ready to mix system)         -- IV Every 4 hours (non-standard times) 06/07/22 0838        Cultures were taken-   Microbiology Results (last 7 days)     Procedure Component Value Units Date/Time    Blood culture [[id number]] Collected: 06/04/22 1255    Order Status: Completed Specimen: Blood Updated: 06/09/22 2212     Blood Culture, Routine No growth after 5 days.    Blood culture [[id number]] Collected: 06/04/22 1301    Order Status: Completed Specimen: Blood Updated: 06/09/22 2212     Blood Culture, Routine No growth after 5 days.        Latest lactate reviewed, they are-  No results for input(s): LACTATE in the last 72 hours.    Organ dysfunction indicated by Acute respiratory failure  Source- uti, bacteremia    Source control Achieved by- iv abxs  6/3 cont iv abxs- follow sensitivites  6/4:  Awaiting sensitivities.  Draw surveillance cultures.   6/5:  Sensitive to vancomycin.  Surveillance cultures negative thus far.  6/6: Continue abx therapy with Rocephin and Vancomycin (day 5). Monitoring repeat blood cultures  6/7: Downgrade abx therpy to Ampicillin - IV abx therapy day 6  6/10: IV abx therapy day 9 with Ampicillin  6/11: Ampicillin D10, patient has been afebrile, second set of cultures final report negative for growth.  6/14: Abx therapy complete

## 2022-06-14 NOTE — ASSESSMENT & PLAN NOTE
Pt with severe back pain - CT of back shows no fractures but severe degenerative dz  Continue pain management and therapy efforts  Discharge to SNF

## 2022-06-15 ENCOUNTER — OUTPATIENT CASE MANAGEMENT (OUTPATIENT)
Dept: ADMINISTRATIVE | Facility: OTHER | Age: 67
End: 2022-06-15
Payer: MEDICARE

## 2022-06-15 NOTE — PHYSICIAN QUERY
PT Name: Aurelia Ruggiero  MR #: [id number]     DOCUMENTATION CLARIFICATION     CDS/: AMANDA Massey, RN, CCDS               Contact information: [email or web address]  This form is a permanent document in the medical record.     Query Date: Nati 15, 2022    By submitting this query, we are merely seeking further clarification of documentation.  Please utilize your independent clinical judgment when addressing the question(s) below.  The Medical Record contains the following   Indicators   Supporting Clinical Findings Location in Medical Record   X SOB, BRIGGS, Wheezing, Productive Cough, Use of Accessory Muscles, etc. Positive for shortness of breath    Pulmonary effort is normal   ED note: Dr. Melo 6/1    H & P: Dr. Kellogg 6/2   X RR         ABGs         O2 sat RR 22  SpO2: 92%    EMS reports patient was noted to be O2 sat on room air of 88% ED note: Dr. Melo 6/1      H & P: Dr. Kellogg 6/2    Hypoxia/Hypercapnia      BiPAP/Intubation/Mechanical Ventilation     X Supplemental O2 2L NC   flowsheet    Home O2, Oxygen Dependence     X Respiratory distress or failure No respiratory distress    Organ dysfunction indicated by Acute respiratory failure   ED note: Dr. Melo 6/1    H & P: Dr. Kellogg 6/2   X Radiology findings CT a chest obtained which showed bilateral ground-glass opacities consistent of pulmonary edema   ED note: Dr. Melo 6/1    Acute/Chronic Illness      Treatment      Other         The noted clinical guidelines are only system guidelines and do not replace the providers clinical judgment.    Provider, please clarify respiratory diangosis associated with above clinical findings.     [ x   ] Acute Respiratory Failure with Hypoxia - ABG pO2 < 60 mmHg or O2 sat of <91% on room air and respiratory symptoms documented   [    ] Acute Respiratory Failure, unspecified whether with hypoxia or hypercapnia   [    ] Hypoxia Only   [    ] Other Respiratory Diagnosis (please specify):  _________________   [   ] Clinically Undetermined       Please document in your progress notes daily for the duration of treatment until resolved and include in your discharge summary.     Reference:    EBONY Obregon MD. (2020, March 13). Acute respiratory distress syndrome: Clinical features, diagnosis, and complications in adults ([id number] VICKI Pruitt MD & [id number] SYLVIE Shepherd MD, Eds.). Retrieved November 13, 2020, from [email or web address]/contents/acute-respiratory-distress-syndrome-clinical-features-diagnosis-and-complications-in-adults?search=ards&source=search_result&selectedTitle=1~150&usage_type=default&display_rank=1  Form No. 81395

## 2022-06-15 NOTE — PHYSICIAN QUERY
PT Name: Aurelia Ruggiero  MR #: [id number]     Documentation Clarification      CDS/: AMANDA Massey, RN, CCDS               Contact information: [email or web address]    This form is a permanent document in the medical record.     Query Date: Nati 15, 2022    By submitting this query, we are merely seeking further clarification of documentation. Please utilize your independent clinical judgment when addressing the question(s) below.    The Medical Record reflects the following:    Clinical Findings Location in Medical Records   Complains of non-traumatic lower back pain since 0435 this morning. Recently treated for UTI    Initial Vitals [06/01/22 0904]   BP Pulse Resp Temp SpO2   121/66 105 (!) 22 98.7 °F  (!) 92 %     Patient has 1/4 sirs criteria no sepsis   ED Note: Dr. Melo 6/1   WBC: 10.37, 7.18    Blood culture: enterococcus faecalis  Lactic acid: 1.4  Procal: 0.20    Sepsis  Organ dysfunction indicated by Acute respiratory failure  Source- uti, bacteremia   Source control Achieved by- iv abxs    6/11: Ampicillin D10, patient has been afebrile, second set of cultures final report negative for growth.  6/14: Abx therapy complete    Lab: 6/1; 6/6    Lab: 6/1        H & P: Dr. Kellogg 6/2          DCS: Dr. Kellogg 6/14                                                                          Provider, please clarify the diagnosis of ____Sepsis_____________:    [  x ] Diagnosis is confirmed and additional clinical support/decision-making indicators for the diagnosis include (please specify):__resp failure; severe pain______________     [   ] Above stated diagnosis is not confirmed and/or it has been ruled out     [   ] Above stated diagnosis is not confirmed and/or it has been ruled out, other diagnosis ruled in (please specify):_______________  [   ] Bacteremia without Sepsis   [   ] SIRS with infection but without Sepsis   [   ] Other Infectious Disease (please specify): __________        [   ] Other  clarification (please specify): ___________________   [  ] Clinically undetermined

## 2022-06-15 NOTE — PROGRESS NOTES
Per chart review 06/14/2022. Pt discharged to  [facility] for Rehab  Pt doesn't meet OPCM criteria . SW will close case as needs being met by others.

## 2022-07-06 ENCOUNTER — LAB VISIT (OUTPATIENT)
Dept: LAB | Facility: HOSPITAL | Age: 67
End: 2022-07-06
Attending: INTERNAL MEDICINE
Payer: MEDICARE

## 2022-07-06 DIAGNOSIS — E07.9 DISEASE OF THYROID GLAND: ICD-10-CM

## 2022-07-06 DIAGNOSIS — E03.9 MYXEDEMA HEART DISEASE: ICD-10-CM

## 2022-07-06 DIAGNOSIS — Z79.899 ENCOUNTER FOR LONG-TERM (CURRENT) USE OF OTHER MEDICATIONS: Primary | ICD-10-CM

## 2022-07-06 DIAGNOSIS — D64.9 ANEMIA, UNSPECIFIED: ICD-10-CM

## 2022-07-06 DIAGNOSIS — I51.9 MYXEDEMA HEART DISEASE: ICD-10-CM

## 2022-07-06 DIAGNOSIS — E78.5 HYPERLIPEMIA: ICD-10-CM

## 2022-07-06 LAB
ALBUMIN SERPL BCP-MCNC: 2.6 G/DL (ref 3.5–5.2)
ALP SERPL-CCNC: 94 U/L (ref 55–135)
ALT SERPL W/O P-5'-P-CCNC: 23 U/L (ref 10–44)
ANION GAP SERPL CALC-SCNC: 8 MMOL/L (ref 8–16)
AST SERPL-CCNC: 29 U/L (ref 10–40)
BASOPHILS # BLD AUTO: 0.09 K/UL (ref 0–0.2)
BASOPHILS NFR BLD: 1 % (ref 0–1.9)
BILIRUB SERPL-MCNC: 1.4 MG/DL (ref 0.1–1)
BUN SERPL-MCNC: 15 MG/DL (ref 8–23)
CALCIUM SERPL-MCNC: 8.6 MG/DL (ref 8.7–10.5)
CHLORIDE SERPL-SCNC: 104 MMOL/L (ref 95–110)
CHOLEST SERPL-MCNC: 188 MG/DL (ref 120–199)
CHOLEST/HDLC SERPL: 4.2 {RATIO} (ref 2–5)
CO2 SERPL-SCNC: 26 MMOL/L (ref 23–29)
CREAT SERPL-MCNC: 0.9 MG/DL (ref 0.5–1.4)
DIFFERENTIAL METHOD: ABNORMAL
EOSINOPHIL # BLD AUTO: 0.1 K/UL (ref 0–0.5)
EOSINOPHIL NFR BLD: 0.8 % (ref 0–8)
ERYTHROCYTE [DISTWIDTH] IN BLOOD BY AUTOMATED COUNT: 14.6 % (ref 11.5–14.5)
EST. GFR  (AFRICAN AMERICAN): >60 ML/MIN/1.73 M^2
EST. GFR  (NON AFRICAN AMERICAN): >60 ML/MIN/1.73 M^2
GLUCOSE SERPL-MCNC: 91 MG/DL (ref 70–110)
HCT VFR BLD AUTO: 35.3 % (ref 37–48.5)
HDLC SERPL-MCNC: 45 MG/DL (ref 40–75)
HDLC SERPL: 23.9 % (ref 20–50)
HGB BLD-MCNC: 11.7 G/DL (ref 12–16)
IMM GRANULOCYTES # BLD AUTO: 0.04 K/UL (ref 0–0.04)
IMM GRANULOCYTES NFR BLD AUTO: 0.4 % (ref 0–0.5)
LDLC SERPL CALC-MCNC: 121 MG/DL (ref 63–159)
LYMPHOCYTES # BLD AUTO: 2.3 K/UL (ref 1–4.8)
LYMPHOCYTES NFR BLD: 24.6 % (ref 18–48)
MCH RBC QN AUTO: 31.7 PG (ref 27–31)
MCHC RBC AUTO-ENTMCNC: 33.1 G/DL (ref 32–36)
MCV RBC AUTO: 96 FL (ref 82–98)
MONOCYTES # BLD AUTO: 1.2 K/UL (ref 0.3–1)
MONOCYTES NFR BLD: 13.1 % (ref 4–15)
NEUTROPHILS # BLD AUTO: 5.7 K/UL (ref 1.8–7.7)
NEUTROPHILS NFR BLD: 60.1 % (ref 38–73)
NONHDLC SERPL-MCNC: 143 MG/DL
NRBC BLD-RTO: 0 /100 WBC
PLATELET # BLD AUTO: 180 K/UL (ref 150–450)
PMV BLD AUTO: 11.7 FL (ref 9.2–12.9)
POTASSIUM SERPL-SCNC: 4 MMOL/L (ref 3.5–5.1)
PROT SERPL-MCNC: 7.5 G/DL (ref 6–8.4)
RBC # BLD AUTO: 3.69 M/UL (ref 4–5.4)
SODIUM SERPL-SCNC: 138 MMOL/L (ref 136–145)
T4 FREE SERPL-MCNC: 0.97 NG/DL (ref 0.71–1.51)
TRIGL SERPL-MCNC: 110 MG/DL (ref 30–150)
TSH SERPL DL<=0.005 MIU/L-ACNC: 27.5 UIU/ML (ref 0.4–4)
WBC # BLD AUTO: 9.43 K/UL (ref 3.9–12.7)

## 2022-07-06 PROCEDURE — 80061 LIPID PANEL: CPT | Performed by: INTERNAL MEDICINE

## 2022-07-06 PROCEDURE — 84443 ASSAY THYROID STIM HORMONE: CPT | Performed by: INTERNAL MEDICINE

## 2022-07-06 PROCEDURE — 80053 COMPREHEN METABOLIC PANEL: CPT | Performed by: INTERNAL MEDICINE

## 2022-07-06 PROCEDURE — 85025 COMPLETE CBC W/AUTO DIFF WBC: CPT | Performed by: INTERNAL MEDICINE

## 2022-07-06 PROCEDURE — 84439 ASSAY OF FREE THYROXINE: CPT | Performed by: INTERNAL MEDICINE

## 2022-07-06 PROCEDURE — 36415 COLL VENOUS BLD VENIPUNCTURE: CPT | Performed by: INTERNAL MEDICINE

## 2022-07-07 PROBLEM — E03.9 HYPOTHYROIDISM: Status: ACTIVE | Noted: 2022-07-07

## 2022-07-17 ENCOUNTER — HOSPITAL ENCOUNTER (EMERGENCY)
Facility: HOSPITAL | Age: 67
Discharge: HOME | End: 2022-07-17
Attending: EMERGENCY MEDICINE
Payer: MEDICARE

## 2022-07-17 VITALS
TEMPERATURE: 99 F | DIASTOLIC BLOOD PRESSURE: 76 MMHG | OXYGEN SATURATION: 98 % | HEART RATE: 120 BPM | SYSTOLIC BLOOD PRESSURE: 107 MMHG | RESPIRATION RATE: 20 BRPM

## 2022-07-17 DIAGNOSIS — I50.9 HEART FAILURE, UNSPECIFIED HF CHRONICITY, UNSPECIFIED HEART FAILURE TYPE: Primary | ICD-10-CM

## 2022-07-17 DIAGNOSIS — N39.0 URINARY TRACT INFECTION WITHOUT HEMATURIA, SITE UNSPECIFIED: ICD-10-CM

## 2022-07-17 DIAGNOSIS — R53.1 WEAKNESS: ICD-10-CM

## 2022-07-17 LAB
ALBUMIN SERPL BCP-MCNC: 2.7 G/DL (ref 3.5–5.2)
ALP SERPL-CCNC: 103 U/L (ref 55–135)
ALT SERPL W/O P-5'-P-CCNC: 30 U/L (ref 10–44)
ANION GAP SERPL CALC-SCNC: 7 MMOL/L (ref 8–16)
AST SERPL-CCNC: 38 U/L (ref 10–40)
BACTERIA #/AREA URNS HPF: ABNORMAL /HPF
BASOPHILS # BLD AUTO: 0.06 K/UL (ref 0–0.2)
BASOPHILS NFR BLD: 0.6 % (ref 0–1.9)
BILIRUB SERPL-MCNC: 2.3 MG/DL (ref 0.1–1)
BILIRUB UR QL STRIP: ABNORMAL
BUN SERPL-MCNC: 24 MG/DL (ref 8–23)
CALCIUM SERPL-MCNC: 8.3 MG/DL (ref 8.7–10.5)
CHLORIDE SERPL-SCNC: 107 MMOL/L (ref 95–110)
CLARITY UR: CLEAR
CO2 SERPL-SCNC: 27 MMOL/L (ref 23–29)
COLOR UR: YELLOW
CREAT SERPL-MCNC: 1.1 MG/DL (ref 0.5–1.4)
CTP QC/QA: YES
DIFFERENTIAL METHOD: ABNORMAL
EOSINOPHIL # BLD AUTO: 0.1 K/UL (ref 0–0.5)
EOSINOPHIL NFR BLD: 0.5 % (ref 0–8)
ERYTHROCYTE [DISTWIDTH] IN BLOOD BY AUTOMATED COUNT: 15.8 % (ref 11.5–14.5)
EST. GFR  (AFRICAN AMERICAN): 59.2 ML/MIN/1.73 M^2
EST. GFR  (NON AFRICAN AMERICAN): 51.3 ML/MIN/1.73 M^2
GLUCOSE SERPL-MCNC: 108 MG/DL (ref 70–110)
GLUCOSE UR QL STRIP: NEGATIVE
HCT VFR BLD AUTO: 34.1 % (ref 37–48.5)
HGB BLD-MCNC: 11.1 G/DL (ref 12–16)
HGB UR QL STRIP: NEGATIVE
HYALINE CASTS #/AREA URNS LPF: 0 /LPF
IMM GRANULOCYTES # BLD AUTO: 0.11 K/UL (ref 0–0.04)
IMM GRANULOCYTES NFR BLD AUTO: 1.1 % (ref 0–0.5)
KETONES UR QL STRIP: ABNORMAL
LEUKOCYTE ESTERASE UR QL STRIP: ABNORMAL
LYMPHOCYTES # BLD AUTO: 1.6 K/UL (ref 1–4.8)
LYMPHOCYTES NFR BLD: 16.6 % (ref 18–48)
MCH RBC QN AUTO: 31.7 PG (ref 27–31)
MCHC RBC AUTO-ENTMCNC: 32.6 G/DL (ref 32–36)
MCV RBC AUTO: 97 FL (ref 82–98)
MICROSCOPIC COMMENT: ABNORMAL
MONOCYTES # BLD AUTO: 0.5 K/UL (ref 0.3–1)
MONOCYTES NFR BLD: 4.7 % (ref 4–15)
NEUTROPHILS # BLD AUTO: 7.5 K/UL (ref 1.8–7.7)
NEUTROPHILS NFR BLD: 76.5 % (ref 38–73)
NITRITE UR QL STRIP: POSITIVE
NRBC BLD-RTO: 2 /100 WBC
NT-PROBNP SERPL-MCNC: 4839 PG/ML (ref 5–900)
PH UR STRIP: 5 [PH] (ref 5–8)
PLATELET # BLD AUTO: 265 K/UL (ref 150–450)
PMV BLD AUTO: 10.4 FL (ref 9.2–12.9)
POTASSIUM SERPL-SCNC: 3.8 MMOL/L (ref 3.5–5.1)
PROT SERPL-MCNC: 8.1 G/DL (ref 6–8.4)
PROT UR QL STRIP: ABNORMAL
RBC # BLD AUTO: 3.5 M/UL (ref 4–5.4)
RBC #/AREA URNS HPF: 11 /HPF (ref 0–4)
SARS-COV-2 RDRP RESP QL NAA+PROBE: NEGATIVE
SODIUM SERPL-SCNC: 141 MMOL/L (ref 136–145)
SP GR UR STRIP: 1.02 (ref 1–1.03)
SQUAMOUS #/AREA URNS HPF: 3 /HPF
TSH SERPL DL<=0.005 MIU/L-ACNC: 2.94 UIU/ML (ref 0.4–4)
URN SPEC COLLECT METH UR: ABNORMAL
UROBILINOGEN UR STRIP-ACNC: ABNORMAL EU/DL
WBC # BLD AUTO: 9.79 K/UL (ref 3.9–12.7)
WBC #/AREA URNS HPF: 7 /HPF (ref 0–5)

## 2022-07-17 PROCEDURE — 93005 ELECTROCARDIOGRAM TRACING: CPT

## 2022-07-17 PROCEDURE — 63600175 PHARM REV CODE 636 W HCPCS: Performed by: EMERGENCY MEDICINE

## 2022-07-17 PROCEDURE — 25000003 PHARM REV CODE 250: Performed by: EMERGENCY MEDICINE

## 2022-07-17 PROCEDURE — 81000 URINALYSIS NONAUTO W/SCOPE: CPT | Performed by: EMERGENCY MEDICINE

## 2022-07-17 PROCEDURE — 84443 ASSAY THYROID STIM HORMONE: CPT | Performed by: EMERGENCY MEDICINE

## 2022-07-17 PROCEDURE — U0002 COVID-19 LAB TEST NON-CDC: HCPCS | Performed by: EMERGENCY MEDICINE

## 2022-07-17 PROCEDURE — 93010 EKG 12-LEAD: ICD-10-PCS | Mod: ,,, | Performed by: INTERNAL MEDICINE

## 2022-07-17 PROCEDURE — 96374 THER/PROPH/DIAG INJ IV PUSH: CPT

## 2022-07-17 PROCEDURE — 85025 COMPLETE CBC W/AUTO DIFF WBC: CPT | Performed by: EMERGENCY MEDICINE

## 2022-07-17 PROCEDURE — 93010 ELECTROCARDIOGRAM REPORT: CPT | Mod: ,,, | Performed by: INTERNAL MEDICINE

## 2022-07-17 PROCEDURE — 83880 ASSAY OF NATRIURETIC PEPTIDE: CPT | Performed by: EMERGENCY MEDICINE

## 2022-07-17 PROCEDURE — 99285 EMERGENCY DEPT VISIT HI MDM: CPT | Mod: 25

## 2022-07-17 PROCEDURE — 36415 COLL VENOUS BLD VENIPUNCTURE: CPT | Performed by: EMERGENCY MEDICINE

## 2022-07-17 PROCEDURE — 80053 COMPREHEN METABOLIC PANEL: CPT | Performed by: EMERGENCY MEDICINE

## 2022-07-17 RX ORDER — CEPHALEXIN 500 MG/1
500 CAPSULE ORAL 4 TIMES DAILY
Qty: 28 CAPSULE | Refills: 0 | Status: SHIPPED | OUTPATIENT
Start: 2022-07-17 | End: 2022-07-24

## 2022-07-17 RX ORDER — METOPROLOL TARTRATE 50 MG/1
50 TABLET ORAL
Status: COMPLETED | OUTPATIENT
Start: 2022-07-17 | End: 2022-07-17

## 2022-07-17 RX ORDER — FUROSEMIDE 10 MG/ML
40 INJECTION INTRAMUSCULAR; INTRAVENOUS
Status: COMPLETED | OUTPATIENT
Start: 2022-07-17 | End: 2022-07-17

## 2022-07-17 RX ADMIN — FUROSEMIDE 40 MG: 10 INJECTION, SOLUTION INTRAMUSCULAR; INTRAVENOUS at 06:07

## 2022-07-17 RX ADMIN — METOPROLOL TARTRATE 50 MG: 50 TABLET, FILM COATED ORAL at 05:07

## 2022-07-17 NOTE — ED PROVIDER NOTES
Encounter Date: 7/17/2022       History     Chief Complaint   Patient presents with    Weakness     Per EMS, NH reports gradual weakness and SOB, states that she is oriented to person, place, not time. X1 day.      This is a 67-year-old female presents to the emergency department from a nursing home here in town complaining of generalized weakness.  Has a history of hypothyroidism, carotid artery disease, atrial fibrillation that is chronic, congestive heart failure.  Patient denies any chest pain or shortness of breath.  She is here just for generalized weakness.  States she is eating and drinking well at the nursing home, denies nausea vomiting diarrhea.  Denies any fever.  She is alert oriented x4, GCS is 15.  She is pleasant and polite.  Afebrile.  Oxygen saturation is 99% on room air.  She is little tachycardic at 117 beats per minute.  She has no focal deficits        Review of patient's allergies indicates:   Allergen Reactions    Fish containing products Itching    Shellfish containing products Itching    Latex, natural rubber Hives     Past Medical History:   Diagnosis Date    Adult hypothyroidism     Anxiety     Atherosclerosis of both carotid arteries     Atrial fibrillation     CHF (congestive heart failure)     Moderate aortic valve stenosis      Past Surgical History:   Procedure Laterality Date    APPENDECTOMY      COLONOSCOPY N/A 1/28/2022    Procedure: COLONOSCOPY;  Surgeon: Jl Terrazas MD;  Location: [facility] ENDO;  Service: General;  Laterality: N/A;    KNEE SURGERY      TONSILLECTOMY      TREATMENT OF CARDIAC ARRHYTHMIA N/A 10/27/2020    Procedure: Cardioversion or Defibrillation;  Surgeon: Gavin Duran MD;  Location: [facility] CATH;  Service: Cardiovascular;  Laterality: N/A;  APPROVED PER POORNIMA 10/6     Family History   Problem Relation Age of Onset    Heart failure Father     Hypertension Sister     Atrial fibrillation Sister     Cancer Brother     Hypertension Brother       Social History     Tobacco Use    Smoking status: Never Smoker    Smokeless tobacco: Never Used   Substance Use Topics    Alcohol use: Never    Drug use: Never     Review of Systems   Constitutional: Negative for fever.   HENT: Negative for sore throat.    Respiratory: Negative for shortness of breath.    Cardiovascular: Negative for chest pain.   Gastrointestinal: Negative for nausea.   Genitourinary: Negative for dysuria.   Musculoskeletal: Negative for back pain.   Skin: Negative for rash.   Neurological: Positive for weakness.   Hematological: Does not bruise/bleed easily.   All other systems reviewed and are negative.      Physical Exam     Initial Vitals [07/17/22 1653]   BP Pulse Resp Temp SpO2   134/79 (!) 117 20 99.2 °F (37.3 °C) 98 %      MAP       --         Physical Exam    Nursing note and vitals reviewed.  Constitutional: She appears well-developed and well-nourished. She is not diaphoretic. No distress.   HENT:   Head: Normocephalic and atraumatic.   Eyes: Conjunctivae and EOM are normal. Pupils are equal, round, and reactive to light.   Neck: Neck supple.   Normal range of motion.  Cardiovascular: Normal heart sounds and intact distal pulses.   No murmur heard.  Irregularly irregular at 117 beats per minute   Pulmonary/Chest: Breath sounds normal. No respiratory distress. She has no wheezes. She has no rhonchi. She has no rales. She exhibits no tenderness.   Abdominal: Abdomen is soft. Bowel sounds are normal. She exhibits no distension and no mass. There is no abdominal tenderness. There is no rebound and no guarding.   Musculoskeletal:         General: Edema present. No tenderness. Normal range of motion.      Cervical back: Normal range of motion and neck supple.     Neurological: She is alert and oriented to person, place, and time. She has normal strength. No cranial nerve deficit. GCS score is 15. GCS eye subscore is 4. GCS verbal subscore is 5. GCS motor subscore is 6.   Skin: Skin is  warm and dry. Capillary refill takes less than 2 seconds.         ED Course   Procedures  Labs Reviewed   CBC W/ AUTO DIFFERENTIAL - Abnormal; Notable for the following components:       Result Value    RBC 3.50 (*)     Hemoglobin 11.1 (*)     Hematocrit 34.1 (*)     MCH 31.7 (*)     RDW 15.8 (*)     Immature Granulocytes 1.1 (*)     Immature Grans (Abs) 0.11 (*)     nRBC 2 (*)     Gran % 76.5 (*)     Lymph % 16.6 (*)     All other components within normal limits   COMPREHENSIVE METABOLIC PANEL - Abnormal; Notable for the following components:    BUN 24 (*)     Calcium 8.3 (*)     Albumin 2.7 (*)     Total Bilirubin 2.3 (*)     Anion Gap 7 (*)     eGFR if  59.2 (*)     eGFR if non  51.3 (*)     All other components within normal limits   URINALYSIS, REFLEX TO URINE CULTURE - Abnormal; Notable for the following components:    Protein, UA 1+ (*)     Ketones, UA Trace (*)     Bilirubin (UA) 1+ (*)     Nitrite, UA Positive (*)     Urobilinogen, UA 4.0-6.0 (*)     Leukocytes, UA Trace (*)     All other components within normal limits    Narrative:     Preferred Collection Type->Urine, Clean Catch  Specimen Source->Urine   URINALYSIS MICROSCOPIC - Abnormal; Notable for the following components:    RBC, UA 11 (*)     WBC, UA 7 (*)     Bacteria Few (*)     All other components within normal limits    Narrative:     Preferred Collection Type->Urine, Clean Catch  Specimen Source->Urine   NT-PRO NATRIURETIC PEPTIDE - Abnormal; Notable for the following components:    NT-proBNP 4839 (*)     All other components within normal limits   TSH   SARS-COV-2 RDRP GENE    Narrative:     This test utilizes isothermal nucleic acid amplification   technology to detect the SARS-CoV-2 RdRp nucleic acid segment.   The analytical sensitivity (limit of detection) is 125 genome   equivalents/mL.   A POSITIVE result implies infection with the SARS-CoV-2 virus;   the patient is presumed to be contagious.     A  NEGATIVE result means that SARS-CoV-2 nucleic acids are not   present above the limit of detection. A NEGATIVE result should be   treated as presumptive. It does not rule out the possibility of   COVID-19 and should not be the sole basis for treatment decisions.   If COVID-19 is strongly suspected based on clinical and exposure   history, re-testing using an alternate molecular assay should be   considered.   This test is only for use under the Food and Drug   Administration s Emergency Use Authorization (EUA).   Commercial kits are provided by [employer].   Performance characteristics of the EUA have been independently   verified by [facility] Department of   Pathology and Laboratory Medicine.   _________________________________________________________________   The authorized Fact Sheet for Healthcare Providers and the authorized Fact   Sheet for Patients of the ID NOW COVID-19 are available on the FDA   website:     https://www.fda.gov/media/323611/download  https://www.fda.gov/media/329437/download            EKG Readings: (Independently Interpreted)   Initial Reading: No STEMI. Rhythm: Atrial Fibrillation. Heart Rate: 131. Ectopy: No Ectopy. Conduction: Normal. ST Segments: Normal ST Segments. T Waves: Normal. Axis: Left Axis Deviation. Clinical Impression: Atrial Fibrillation with RVR       Imaging Results          X-Ray Chest 1 View (In process)                  Medications   metoprolol tartrate (LOPRESSOR) tablet 50 mg (50 mg Oral Given 7/17/22 1715)   furosemide injection 40 mg (40 mg Intravenous Given 7/17/22 1839)     Medical Decision Making:   Differential Diagnosis:   Generalized weakness, viral syndrome, UTI, electrolyte abnormality  ED Management:  Rate controlled at  upon reassessment. Patient is without complaint. Denies dyspnea. No urinary symptoms. Just complaints of overall fatigue. Improved with diuresis. Will cover urine with culture pending.              ED Course as  of 07/1955   Sun Jul 17, 2022   1725 Chest x-ray revealed what appears to be pulmonary edema, very similar to previous chest x-ray in June [SD]   1758 Awaiting labs, patient turned over to night time physician  [SD]      ED Course User Index  [SD] Manas Ashraf MD             Clinical Impression:   Final diagnoses:  [R53.1] Weakness  [I50.9] Heart failure, unspecified HF chronicity, unspecified heart failure type (Primary)  [N39.0] Urinary tract infection without hematuria, site unspecified          ED Disposition Condition    Discharge Stable        ED Prescriptions     Medication Sig Dispense Start Date End Date Auth. Provider    cephALEXin (KEFLEX) 500 MG capsule Take 1 capsule (500 mg total) by mouth 4 (four) times daily. for 7 days 28 capsule 7/17/2022 7/24/2022 Beni Solis MD        Follow-up Information     Follow up With Specialties Details Why Contact Info    Marimar Kellogg MD Internal Medicine Schedule an appointment as soon as possible for a visit   [address] [place] [id number]  [phone]             Beni Solis MD  07/1955

## 2022-07-18 NOTE — ED NOTES
Attempted to contact [facility] to arrange transportation, no one answered, will call back

## 2022-07-24 ENCOUNTER — HOSPITAL ENCOUNTER (EMERGENCY)
Facility: HOSPITAL | Age: 67
Discharge: HOME | End: 2022-07-24
Attending: EMERGENCY MEDICINE
Payer: MEDICARE

## 2022-07-24 VITALS
BODY MASS INDEX: 50.02 KG/M2 | WEIGHT: 293 LBS | OXYGEN SATURATION: 98 % | HEART RATE: 102 BPM | RESPIRATION RATE: 24 BRPM | SYSTOLIC BLOOD PRESSURE: 109 MMHG | HEIGHT: 64 IN | TEMPERATURE: 98 F | DIASTOLIC BLOOD PRESSURE: 67 MMHG

## 2022-07-24 DIAGNOSIS — N39.0 URINARY TRACT INFECTION WITHOUT HEMATURIA, SITE UNSPECIFIED: ICD-10-CM

## 2022-07-24 DIAGNOSIS — R79.89 HIGH SERUM THYROXINE (T4): ICD-10-CM

## 2022-07-24 DIAGNOSIS — R06.02 SOB (SHORTNESS OF BREATH): ICD-10-CM

## 2022-07-24 DIAGNOSIS — R09.02 HYPOXIA: ICD-10-CM

## 2022-07-24 DIAGNOSIS — I48.91 ATRIAL FIBRILLATION, UNSPECIFIED TYPE: Primary | ICD-10-CM

## 2022-07-24 LAB
ALBUMIN SERPL BCP-MCNC: 2.4 G/DL (ref 3.5–5.2)
ALP SERPL-CCNC: 77 U/L (ref 55–135)
ALT SERPL W/O P-5'-P-CCNC: 28 U/L (ref 10–44)
AMMONIA PLAS-SCNC: 34 UMOL/L (ref 10–50)
AMPHET+METHAMPHET UR QL: NEGATIVE
ANION GAP SERPL CALC-SCNC: 7 MMOL/L (ref 8–16)
APTT BLDCRRT: 30.2 SEC (ref 21–32)
AST SERPL-CCNC: 37 U/L (ref 10–40)
BACTERIA #/AREA URNS HPF: ABNORMAL /HPF
BARBITURATES UR QL SCN>200 NG/ML: NEGATIVE
BASOPHILS # BLD AUTO: 0.05 K/UL (ref 0–0.2)
BASOPHILS NFR BLD: 0.4 % (ref 0–1.9)
BENZODIAZ UR QL SCN>200 NG/ML: ABNORMAL
BILIRUB SERPL-MCNC: 2.1 MG/DL (ref 0.1–1)
BILIRUB UR QL STRIP: ABNORMAL
BUN SERPL-MCNC: 31 MG/DL (ref 8–23)
BZE UR QL SCN: NEGATIVE
CALCIUM SERPL-MCNC: 8.3 MG/DL (ref 8.7–10.5)
CANNABINOIDS UR QL SCN: NEGATIVE
CHLORIDE SERPL-SCNC: 102 MMOL/L (ref 95–110)
CLARITY UR: CLEAR
CO2 SERPL-SCNC: 31 MMOL/L (ref 23–29)
COLOR UR: YELLOW
CORRECTED TEMPERATURE (PCO2): 41.7 MMHG
CORRECTED TEMPERATURE (PCO2): 42.6 MMHG
CORRECTED TEMPERATURE (PH): 7.5
CORRECTED TEMPERATURE (PH): 7.51
CORRECTED TEMPERATURE (PO2): 53.2 MMHG
CORRECTED TEMPERATURE (PO2): 79.3 MMHG
CREAT SERPL-MCNC: 1 MG/DL (ref 0.5–1.4)
CREAT UR-MCNC: 128 MG/DL (ref 15–325)
CTP QC/QA: YES
DIFFERENTIAL METHOD: ABNORMAL
EOSINOPHIL # BLD AUTO: 0.1 K/UL (ref 0–0.5)
EOSINOPHIL NFR BLD: 0.4 % (ref 0–8)
ERYTHROCYTE [DISTWIDTH] IN BLOOD BY AUTOMATED COUNT: 16.9 % (ref 11.5–14.5)
EST. GFR  (AFRICAN AMERICAN): >60 ML/MIN/1.73 M^2
EST. GFR  (NON AFRICAN AMERICAN): 57.6 ML/MIN/1.73 M^2
FIO2: 32 %
FIO2: 32 %
GLUCOSE SERPL-MCNC: 117 MG/DL (ref 70–110)
GLUCOSE UR QL STRIP: NEGATIVE
HCT VFR BLD AUTO: 33.9 % (ref 37–48.5)
HGB BLD-MCNC: 11.1 G/DL (ref 12–16)
HGB UR QL STRIP: NEGATIVE
HYALINE CASTS #/AREA URNS LPF: 9 /LPF
IMM GRANULOCYTES # BLD AUTO: 0.1 K/UL (ref 0–0.04)
IMM GRANULOCYTES NFR BLD AUTO: 0.7 % (ref 0–0.5)
INR PPP: 1.3 (ref 0.8–1.2)
KETONES UR QL STRIP: ABNORMAL
LACTATE SERPL-SCNC: 1.3 MMOL/L (ref 0.5–2.2)
LEUKOCYTE ESTERASE UR QL STRIP: ABNORMAL
LIPASE SERPL-CCNC: 72 U/L (ref 23–300)
LPM: 3
LPM: 3
LYMPHOCYTES # BLD AUTO: 2.1 K/UL (ref 1–4.8)
LYMPHOCYTES NFR BLD: 15.5 % (ref 18–48)
Lab: ABNORMAL
MAGNESIUM SERPL-MCNC: 2.4 MG/DL (ref 1.6–2.6)
MCH RBC QN AUTO: 32 PG (ref 27–31)
MCHC RBC AUTO-ENTMCNC: 32.7 G/DL (ref 32–36)
MCV RBC AUTO: 98 FL (ref 82–98)
METHADONE UR QL SCN>300 NG/ML: NEGATIVE
MICROSCOPIC COMMENT: ABNORMAL
MODIFIED ALLEN'S TEST: ABNORMAL
MODIFIED ALLEN'S TEST: ABNORMAL
MONOCYTES # BLD AUTO: 0.6 K/UL (ref 0.3–1)
MONOCYTES NFR BLD: 4.3 % (ref 4–15)
NEUTROPHILS # BLD AUTO: 10.7 K/UL (ref 1.8–7.7)
NEUTROPHILS NFR BLD: 78.7 % (ref 38–73)
NITRITE UR QL STRIP: NEGATIVE
NOTIFIED BY: ABNORMAL
NOTIFIED BY: ABNORMAL
NRBC BLD-RTO: 0 /100 WBC
NT-PROBNP SERPL-MCNC: 4861 PG/ML (ref 5–900)
O2DEVICE: ABNORMAL
O2DEVICE: ABNORMAL
OPIATES UR QL SCN: ABNORMAL
PCO2 BLDA: 41.7 MMHG (ref 35–45)
PCO2 BLDA: 42.6 MMHG (ref 35–45)
PCP UR QL SCN>25 NG/ML: NEGATIVE
PH SMN: 7.5 [PH] (ref 7.34–7.45)
PH SMN: 7.51 [PH] (ref 7.34–7.45)
PH UR STRIP: 5 [PH] (ref 5–8)
PLATELET # BLD AUTO: 237 K/UL (ref 150–450)
PMV BLD AUTO: 11.2 FL (ref 9.2–12.9)
PO2 BLDA: 53.2 MMHG (ref 80–100)
PO2 BLDA: 79.3 MMHG (ref 80–100)
POC BASE DEFICIT: 10.1 MMOL/L
POC BASE DEFICIT: 9.6 MMOL/L
POC HCO3: 32.4 MMOL/L
POC HCO3: 32.7 MMOL/L
POC NOTIFIED NOTE: ABNORMAL
POC NOTIFIED NOTE: ABNORMAL
POC PERFORMED BY: ABNORMAL
POC PERFORMED BY: ABNORMAL
POC SATURATED O2: 88.7 %
POC SATURATED O2: 97 %
POC TCO2: 29.5 MMOL/L
POC TCO2: 29.8 MMOL/L
POC TEMPERATURE: 37 C
POC TEMPERATURE: 37 C
POTASSIUM SERPL-SCNC: 3.5 MMOL/L (ref 3.5–5.1)
PROT SERPL-MCNC: 8 G/DL (ref 6–8.4)
PROT UR QL STRIP: NEGATIVE
PROTHROMBIN TIME: 13.3 SEC (ref 9–12.5)
PROVIDER NOTIFIED: ABNORMAL
PROVIDER NOTIFIED: ABNORMAL
RBC # BLD AUTO: 3.47 M/UL (ref 4–5.4)
RBC #/AREA URNS HPF: 7 /HPF (ref 0–4)
SARS-COV-2 RDRP RESP QL NAA+PROBE: NEGATIVE
SODIUM SERPL-SCNC: 140 MMOL/L (ref 136–145)
SP GR UR STRIP: 1.02 (ref 1–1.03)
SPECIMEN SOURCE: ABNORMAL
SPECIMEN SOURCE: ABNORMAL
SQUAMOUS #/AREA URNS HPF: 3 /HPF
T4 SERPL-MCNC: 14.2 UG/DL (ref 4.5–11.5)
TOXICOLOGY INFORMATION: ABNORMAL
TROPONIN I SERPL DL<=0.01 NG/ML-MCNC: 31.6 PG/ML (ref 0–60)
TSH SERPL DL<=0.005 MIU/L-ACNC: 2.69 UIU/ML (ref 0.4–4)
URN SPEC COLLECT METH UR: ABNORMAL
UROBILINOGEN UR STRIP-ACNC: 1 EU/DL
WBC # BLD AUTO: 13.64 K/UL (ref 3.9–12.7)
WBC #/AREA URNS HPF: 4 /HPF (ref 0–5)

## 2022-07-24 PROCEDURE — 84436 ASSAY OF TOTAL THYROXINE: CPT | Performed by: EMERGENCY MEDICINE

## 2022-07-24 PROCEDURE — 83735 ASSAY OF MAGNESIUM: CPT | Performed by: EMERGENCY MEDICINE

## 2022-07-24 PROCEDURE — 96374 THER/PROPH/DIAG INJ IV PUSH: CPT

## 2022-07-24 PROCEDURE — 84484 ASSAY OF TROPONIN QUANT: CPT | Performed by: EMERGENCY MEDICINE

## 2022-07-24 PROCEDURE — 85610 PROTHROMBIN TIME: CPT | Performed by: EMERGENCY MEDICINE

## 2022-07-24 PROCEDURE — 83690 ASSAY OF LIPASE: CPT | Performed by: EMERGENCY MEDICINE

## 2022-07-24 PROCEDURE — 63600175 PHARM REV CODE 636 W HCPCS: Performed by: EMERGENCY MEDICINE

## 2022-07-24 PROCEDURE — 82140 ASSAY OF AMMONIA: CPT | Performed by: EMERGENCY MEDICINE

## 2022-07-24 PROCEDURE — 99285 EMERGENCY DEPT VISIT HI MDM: CPT | Mod: 25

## 2022-07-24 PROCEDURE — 87040 BLOOD CULTURE FOR BACTERIA: CPT | Mod: 59 | Performed by: EMERGENCY MEDICINE

## 2022-07-24 PROCEDURE — 93010 EKG 12-LEAD: ICD-10-PCS | Mod: ,,, | Performed by: INTERNAL MEDICINE

## 2022-07-24 PROCEDURE — 87186 SC STD MICRODIL/AGAR DIL: CPT | Performed by: EMERGENCY MEDICINE

## 2022-07-24 PROCEDURE — 99900035 HC TECH TIME PER 15 MIN (STAT)

## 2022-07-24 PROCEDURE — U0002 COVID-19 LAB TEST NON-CDC: HCPCS | Performed by: EMERGENCY MEDICINE

## 2022-07-24 PROCEDURE — 80053 COMPREHEN METABOLIC PANEL: CPT | Performed by: EMERGENCY MEDICINE

## 2022-07-24 PROCEDURE — 84443 ASSAY THYROID STIM HORMONE: CPT | Performed by: EMERGENCY MEDICINE

## 2022-07-24 PROCEDURE — 93005 ELECTROCARDIOGRAM TRACING: CPT

## 2022-07-24 PROCEDURE — 82803 BLOOD GASES ANY COMBINATION: CPT

## 2022-07-24 PROCEDURE — 85730 THROMBOPLASTIN TIME PARTIAL: CPT | Performed by: EMERGENCY MEDICINE

## 2022-07-24 PROCEDURE — 81000 URINALYSIS NONAUTO W/SCOPE: CPT | Mod: 59 | Performed by: EMERGENCY MEDICINE

## 2022-07-24 PROCEDURE — 93010 ELECTROCARDIOGRAM REPORT: CPT | Mod: ,,, | Performed by: INTERNAL MEDICINE

## 2022-07-24 PROCEDURE — 36600 WITHDRAWAL OF ARTERIAL BLOOD: CPT

## 2022-07-24 PROCEDURE — 80307 DRUG TEST PRSMV CHEM ANLYZR: CPT | Performed by: EMERGENCY MEDICINE

## 2022-07-24 PROCEDURE — 83605 ASSAY OF LACTIC ACID: CPT | Performed by: EMERGENCY MEDICINE

## 2022-07-24 PROCEDURE — 87077 CULTURE AEROBIC IDENTIFY: CPT | Mod: 59 | Performed by: EMERGENCY MEDICINE

## 2022-07-24 PROCEDURE — 36415 COLL VENOUS BLD VENIPUNCTURE: CPT | Performed by: EMERGENCY MEDICINE

## 2022-07-24 PROCEDURE — 83880 ASSAY OF NATRIURETIC PEPTIDE: CPT | Performed by: EMERGENCY MEDICINE

## 2022-07-24 PROCEDURE — 85025 COMPLETE CBC W/AUTO DIFF WBC: CPT | Performed by: EMERGENCY MEDICINE

## 2022-07-24 RX ORDER — FUROSEMIDE 10 MG/ML
20 INJECTION INTRAMUSCULAR; INTRAVENOUS
Status: COMPLETED | OUTPATIENT
Start: 2022-07-24 | End: 2022-07-24

## 2022-07-24 RX ORDER — LEVOFLOXACIN 750 MG/1
750 TABLET ORAL DAILY
Qty: 5 TABLET | Refills: 0 | Status: SHIPPED | OUTPATIENT
Start: 2022-07-24

## 2022-07-24 RX ADMIN — FUROSEMIDE 20 MG: 10 INJECTION, SOLUTION INTRAMUSCULAR; INTRAVENOUS at 05:07

## 2022-07-24 NOTE — ED NOTES
Pt's son at bedside with Dr Thomas.  Pt's son updated on treatment plan and approximate wait times.  Pt's son voiced agreement with treatment plan.

## 2022-07-24 NOTE — ED NOTES
Pt arrived from nursing home with dirty diaper, full of urine and feces.  Pt's vagina was full of fresh feces and dried feces.  Several areas of skin breakdown noted.

## 2022-07-24 NOTE — DISCHARGE INSTRUCTIONS
PATIENT IS ALERT AND ORIENTED X 4. NO NEURO DEFICITS.     KEEP PATIENT ON HER PRN OXYGEN! SHE WAS HYPOXIC ON ABG UPON ARRIVAL BUT AFTER ONLY 1HR ON O2 pO2 NORMALIZED.    PATIENT STILL HAS UTI. GIVE LEVAQUIN FOR THIS. Read all discharge instructions/education provided: follow all recommendations and return precautions.  Take all medications as prescribed.  Follow up with your primary care doctor and/or specialist as directed.  Return to emergency department immediately for any new or worsening or recurrent symptoms.

## 2022-07-26 ENCOUNTER — APPOINTMENT (OUTPATIENT)
Dept: LAB | Facility: HOSPITAL | Age: 67
End: 2022-07-26
Attending: INTERNAL MEDICINE
Payer: MEDICARE

## 2022-07-26 DIAGNOSIS — N39.0 URINARY TRACT INFECTION, SITE NOT SPECIFIED: Primary | ICD-10-CM

## 2022-07-26 LAB
BACTERIA #/AREA URNS HPF: NEGATIVE /HPF
BILIRUB UR QL STRIP: NEGATIVE
CLARITY UR: ABNORMAL
COLOR UR: YELLOW
GLUCOSE UR QL STRIP: NEGATIVE
HGB UR QL STRIP: NEGATIVE
HYALINE CASTS #/AREA URNS LPF: 3.1 /LPF
KETONES UR QL STRIP: NEGATIVE
LEUKOCYTE ESTERASE UR QL STRIP: ABNORMAL
MICROSCOPIC COMMENT: ABNORMAL
NITRITE UR QL STRIP: NEGATIVE
PH UR STRIP: 6 [PH] (ref 5–8)
PROT UR QL STRIP: NEGATIVE
RBC #/AREA URNS HPF: 2 /HPF (ref 0–4)
SP GR UR STRIP: 1.01 (ref 1–1.03)
SQUAMOUS #/AREA URNS HPF: 1 /HPF
URN SPEC COLLECT METH UR: ABNORMAL
UROBILINOGEN UR STRIP-ACNC: 1 EU/DL
WBC #/AREA URNS HPF: 1 /HPF (ref 0–5)

## 2022-07-26 PROCEDURE — 81000 URINALYSIS NONAUTO W/SCOPE: CPT | Performed by: INTERNAL MEDICINE

## 2022-07-27 NOTE — ED NOTES
7/27/22 @ 1210 [place] on call for [facility] contacted for positive blood cultures, states they were aware of blood cultures and transferred patient to [place] for a ALEX. Dr Thomas made aware.

## 2022-07-27 NOTE — ED PROVIDER NOTES
Encounter Date: 7/24/2022       History     Chief Complaint   Patient presents with    Irregular Heart Beat    Altered Mental Status     Pt sent from nursing home by [facility].  Nursing home staff reports that pt has an irregular heart beat (hx of afib) and AMS.  Pt was recently admitted to [facility] and treated for CHF and UTI.       67-year-old female with a history of AFib, CHF, hypothyroidism sent to ER from nursing home for altered mental status and irregular heartbeat.  Patient has an order for p.r.n. oxygen but the nursing home has not had her on oxygen.  No chest pain or shortness of breath or abdominal pain or vomiting or diarrhea.  No numbness or weakness or speech difficulty or vision changes. Pt A&Ox4.            Review of patient's allergies indicates:   Allergen Reactions    Fish containing products Itching    Shellfish containing products Itching    Latex, natural rubber Hives     Past Medical History:   Diagnosis Date    Adult hypothyroidism     Anxiety     Atherosclerosis of both carotid arteries     Atrial fibrillation     CHF (congestive heart failure)     Moderate aortic valve stenosis      Past Surgical History:   Procedure Laterality Date    APPENDECTOMY      COLONOSCOPY N/A 1/28/2022    Procedure: COLONOSCOPY;  Surgeon: Jl Terrazas MD;  Location: [facility] ENDO;  Service: General;  Laterality: N/A;    KNEE SURGERY      TONSILLECTOMY      TREATMENT OF CARDIAC ARRHYTHMIA N/A 10/27/2020    Procedure: Cardioversion or Defibrillation;  Surgeon: Gavin Duran MD;  Location: [facility] CATH;  Service: Cardiovascular;  Laterality: N/A;  APPROVED PER POORNIMA 10/6     Family History   Problem Relation Age of Onset    Heart failure Father     Hypertension Sister     Atrial fibrillation Sister     Cancer Brother     Hypertension Brother      Social History     Tobacco Use    Smoking status: Never Smoker    Smokeless tobacco: Never Used   Substance Use Topics    Alcohol use: Never    Drug use:  Never     Review of Systems   Constitutional: Negative for fever.   HENT: Negative for sore throat, trouble swallowing and voice change.    Respiratory: Negative for shortness of breath and wheezing.    Cardiovascular: Positive for leg swelling. Negative for chest pain.   Gastrointestinal: Negative for abdominal pain, nausea and vomiting.   Genitourinary: Negative for dysuria.   Musculoskeletal: Negative for back pain.   Skin: Negative for rash.   Neurological: Negative for weakness.   Hematological: Does not bruise/bleed easily.   All other systems reviewed and are negative.      Physical Exam     Initial Vitals [07/24/22 0300]   BP Pulse Resp Temp SpO2   116/69 105 14 97.7 °F (36.5 °C) 98 %      MAP       --         Physical Exam    Nursing note and vitals reviewed.  Constitutional: She appears well-developed and well-nourished. She is not diaphoretic. No distress.   HENT:   Head: Normocephalic and atraumatic.   Eyes: EOM are normal. Pupils are equal, round, and reactive to light.   Neck: Neck supple.   Normal range of motion.  Cardiovascular: Normal rate and regular rhythm.   Pulmonary/Chest: Breath sounds normal. She has no wheezes.   Abdominal: Abdomen is soft. Bowel sounds are normal. There is no abdominal tenderness.   Musculoskeletal:         General: No edema. Normal range of motion.      Cervical back: Normal range of motion and neck supple.     Neurological: She is alert and oriented to person, place, and time. She has normal strength. No cranial nerve deficit or sensory deficit.   Skin: Skin is warm and dry.   Psychiatric: She has a normal mood and affect. Thought content normal.         ED Course   Procedures  Labs Reviewed   NT-PRO NATRIURETIC PEPTIDE - Abnormal; Notable for the following components:       Result Value    NT-proBNP 4861 (*)     All other components within normal limits   CBC W/ AUTO DIFFERENTIAL - Abnormal; Notable for the following components:    WBC 13.64 (*)     RBC 3.47 (*)      Hemoglobin 11.1 (*)     Hematocrit 33.9 (*)     MCH 32.0 (*)     RDW 16.9 (*)     Immature Granulocytes 0.7 (*)     Gran # (ANC) 10.7 (*)     Immature Grans (Abs) 0.10 (*)     Gran % 78.7 (*)     Lymph % 15.5 (*)     All other components within normal limits   COMPREHENSIVE METABOLIC PANEL - Abnormal; Notable for the following components:    CO2 31 (*)     Glucose 117 (*)     BUN 31 (*)     Calcium 8.3 (*)     Albumin 2.4 (*)     Total Bilirubin 2.1 (*)     Anion Gap 7 (*)     eGFR if non  57.6 (*)     All other components within normal limits   DRUG SCREEN PANEL, URINE EMERGENCY - Abnormal; Notable for the following components:    Benzodiazepines Presumptive Positive (*)     Opiate Scrn, Ur Presumptive Positive (*)     All other components within normal limits    Narrative:     Preferred Collection Type->Urine, Clean Catch  Specimen Source->Urine   PROTIME-INR - Abnormal; Notable for the following components:    Prothrombin Time 13.3 (*)     INR 1.3 (*)     All other components within normal limits   URINALYSIS, REFLEX TO URINE CULTURE - Abnormal; Notable for the following components:    Ketones, UA Trace (*)     Bilirubin (UA) 1+ (*)     Leukocytes, UA Trace (*)     All other components within normal limits    Narrative:     Preferred Collection Type->Urine, Clean Catch  Specimen Source->Urine   T4 - Abnormal; Notable for the following components:    T4, Total 14.2 (*)     All other components within normal limits   URINALYSIS MICROSCOPIC - Abnormal; Notable for the following components:    RBC, UA 7 (*)     Hyaline Casts, UA 9 (*)     All other components within normal limits    Narrative:     Preferred Collection Type->Urine, Clean Catch  Specimen Source->Urine   CULTURE, BLOOD   CULTURE, BLOOD   APTT   LACTIC ACID, PLASMA   LIPASE   MAGNESIUM   TROPONIN I HIGH SENSITIVITY   AMMONIA   TSH   SARS-COV-2 RDRP GENE    Narrative:     .This test utilizes isothermal nucleic acid amplification    technology to detect the SARS-CoV-2 RdRp nucleic acid segment.   The analytical sensitivity (limit of detection) is 125 genome   equivalents/mL.   A POSITIVE result implies infection with the SARS-CoV-2 virus;   the patient is presumed to be contagious.     A NEGATIVE result means that SARS-CoV-2 nucleic acids are not   present above the limit of detection. A NEGATIVE result should be   treated as presumptive. It does not rule out the possibility of   COVID-19 and should not be the sole basis for treatment decisions.   If COVID-19 is strongly suspected based on clinical and exposure   history, re-testing using an alternate molecular assay should be   considered.   This test is only for use under the Food and Drug   Administration s Emergency Use Authorization (EUA).   Commercial kits are provided by [employer].   Performance characteristics of the EUA have been independently   verified by [facility] Department of   Pathology and Laboratory Medicine.   _________________________________________________________________   The authorized Fact Sheet for Healthcare Providers and the authorized Fact   Sheet for Patients of the ID NOW COVID-19 are available on the FDA   website:     https://www.fda.gov/media/746460/download  https://www.fda.gov/media/950088/download               ECG Results          EKG 12-lead (Final result)  Result time 07/24/22 07:15:28    Final result by Interface, Lab In [facility] (07/24/22 07:15:28)                 Narrative:    Test Reason : R06.02,    Vent. Rate : 099 BPM     Atrial Rate : 108 BPM     P-R Int : 000 ms          QRS Dur : 096 ms      QT Int : 382 ms       P-R-T Axes : 000 -38 020 degrees     QTc Int : 490 ms    Atrial fibrillation  Left axis deviation  Prolonged QT  Abnormal ECG  When compared with ECG of 17-JUL-2022 17:14,  T wave inversion no longer evident in Lateral leads  Confirmed by DORA SILVER, HUEMRA ([id number]) on 7/24/2022 7:15:18 AM    Referred By:  AAAREFERR   SELF           Confirmed By:HUMERA JOHN MD                             EKG 12-LEAD (Final result)  Result time 07/26/22 12:42:46    Final result by Unknown User (07/26/22 12:42:46)                                Imaging Results          X-Ray Chest 1 View (Final result)  Result time 07/24/22 14:20:06    Final result by Abbe Calabrese MD (07/24/22 14:20:06)                 Impression:      1. Interval improved aeration since the prior study suggesting improved pulmonary edema.  There remains a component of interstitial prominence which may indicate mild residual edema or chronic interstitial change.  2. Stable elevation of the right hemidiaphragm.      Electronically signed by: Abbe Calabrese MD  Date:    07/24/2022  Time:    14:20             Narrative:    EXAMINATION:  XR CHEST 1 VIEW    CLINICAL HISTORY:  Shortness of breath    TECHNIQUE:  Portable AP chest    COMPARISON:  07/17/2022    FINDINGS:  Elevation of the right hemidiaphragm, unchanged.  Stable cardiomediastinal contours.  Interval improved aeration of bilateral lungs since the prior study with mild residual interstitial prominence bilaterally.  No acute osseous change.                                 Medications   furosemide injection 20 mg (20 mg Intravenous Given 7/24/22 0549)                Attending Attestation:             Attending ED Notes:   67-year-old female with a history of AFib, CHF, hypothyroidism sent to ER from nursing home for altered mental status and irregular heartbeat.  Patient has an order for p.r.n. oxygen but the nursing home has not had her on oxygen.  No chest pain or shortness of breath or abdominal pain or vomiting or diarrhea.  No numbness or weakness or speech difficulty or vision changes.  EKG reveals AFib, no STEMI.  Patient with acute on chronic CHF.  Lasix given.  Unfortunately patient was not on oxygen at the nursing home like she should have been.  ABG revealed hypercapnia.  We placed the patient on  oxygen for an hour repeat ABG and pCO2 normalized.  Patient also has UTI and high serum and thyroxine.  Patient given prescription for Levaquin.  No sisters.  No evidence of sepsis.  No chest pain. CXR improved from previous. Labs reveal no other acute/emergent pathology. Patient is non-toxic appearing, in no acute distress, and vital signs are stable and normal upon discharge. Upon completion of ED evaluation and management, with consideration of thorough differential diagnosis, the patient was found to have no acutely abnormal physical exam findings or other pathology requiring further emergent intervention or admission at this time. Patient/caregiver has no complaints upon discharge and verbalizes understanding and agreement with diagnosis and treatment plan. Discharge instructions with return precautions provided. Patient/caregiver verbalizes understanding to return to ED immediately for any new or worsening symptoms and to follow up with PCP/specialist recommended in 1-2 days.                      Clinical Impression:   Final diagnoses:  [R06.02] SOB (shortness of breath)  [I48.91] Atrial fibrillation, unspecified type (Primary)  [N39.0] Urinary tract infection without hematuria, site unspecified  [R09.02] Hypoxia  [R79.89] High serum thyroxine (T4)          ED Disposition Condition    Discharge Stable        ED Prescriptions     Medication Sig Dispense Start Date End Date Auth. Provider    levoFLOXacin (LEVAQUIN) 750 MG tablet Take 1 tablet (750 mg total) by mouth once daily. 5 tablet 7/24/2022  Rosa Thomas MD        Follow-up Information     Follow up With Specialties Details Why Contact Info Additional Information    Marimar Kellogg MD Internal Medicine Schedule an appointment as soon as possible for a visit in 1 day  [address] [place] [id number]  [phone]       [facility] Emergency Department Emergency Medicine Go to  As needed, If symptoms worsen [address] [place] [address]  [place] [address]  [phone] Floor 1           Rosa Thomas MD  07/27/22 1440

## 2022-07-27 NOTE — ED NOTES
7/26/22 @ 2215 + pedi blood culture, gram + cocci in cains resembling strep, Dr Ashraf aware to review chart.

## 2022-07-30 LAB
BACTERIA BLD CULT: ABNORMAL

## 2022-09-05 PROBLEM — J81.0 ACUTE PULMONARY EDEMA: Status: RESOLVED | Noted: 2022-06-02 | Resolved: 2022-09-05

## 2023-10-04 ENCOUNTER — PATIENT MESSAGE (OUTPATIENT)
Dept: ADMINISTRATIVE | Facility: OTHER | Age: 68
End: 2023-10-04
Payer: MEDICARE

## 2024-10-21 ENCOUNTER — PATIENT MESSAGE (OUTPATIENT)
Dept: FAMILY MEDICINE | Facility: CLINIC | Age: 69
End: 2024-10-21
Payer: MEDICARE

## (undated) DEVICE — TUBING OXYGEN CONNECT BUBBLE

## (undated) DEVICE — SOL IRRI STRL WATER 1000ML

## (undated) DEVICE — TUBING SUC UNIV W/CONN 12FT

## (undated) DEVICE — ELECTRODE FOAM 535 TEARDROP

## (undated) DEVICE — TUBE SUC UNIVERSAL .25XIN 6FT

## (undated) DEVICE — KIT VIA CUSTOM PROCEDURE

## (undated) DEVICE — KIT BIOGUARD AIR WTR SUC VALVE

## (undated) DEVICE — SYR SLIP TIP 60 CC DISP

## (undated) DEVICE — GOWN SURGICAL BRTHBL XL

## (undated) DEVICE — FORCEP ENDOJAW OVL NDL 2X1550

## (undated) DEVICE — UNDERPAD DISPOSABLE 30X30IN

## (undated) DEVICE — LINER SUCTION CANNISTER REGUGA

## (undated) DEVICE — SPONGE DRY VIA GREEN

## (undated) DEVICE — CONNECTOR TORRENT SCP OLYMPUS

## (undated) DEVICE — CANNULA SUPERSOFT CO2 M AD 7FT

## (undated) DEVICE — BITE BLOCK ADULT JUMBO ENDO W/